# Patient Record
Sex: FEMALE | Race: WHITE | NOT HISPANIC OR LATINO | Employment: OTHER | URBAN - METROPOLITAN AREA
[De-identification: names, ages, dates, MRNs, and addresses within clinical notes are randomized per-mention and may not be internally consistent; named-entity substitution may affect disease eponyms.]

---

## 2019-07-09 ENCOUNTER — HOSPITAL ENCOUNTER (INPATIENT)
Facility: MEDICAL CENTER | Age: 69
LOS: 13 days | DRG: 086 | End: 2019-07-22
Attending: EMERGENCY MEDICINE | Admitting: SURGERY
Payer: MEDICARE

## 2019-07-09 ENCOUNTER — HOSPITAL ENCOUNTER (OUTPATIENT)
Dept: RADIOLOGY | Facility: MEDICAL CENTER | Age: 69
End: 2019-07-09

## 2019-07-09 DIAGNOSIS — S06.340A TRAUMATIC HEMORRHAGE OF RIGHT CEREBRUM WITHOUT LOSS OF CONSCIOUSNESS, INITIAL ENCOUNTER (HCC): ICD-10-CM

## 2019-07-09 DIAGNOSIS — N39.0 ACUTE UTI: ICD-10-CM

## 2019-07-09 PROBLEM — Z53.09 CONTRAINDICATION TO DEEP VEIN THROMBOSIS (DVT) PROPHYLAXIS: Status: ACTIVE | Noted: 2019-07-09

## 2019-07-09 PROBLEM — T14.90XA TRAUMA: Status: ACTIVE | Noted: 2019-07-09

## 2019-07-09 PROBLEM — E03.9 HYPOTHYROIDISM: Status: ACTIVE | Noted: 2019-07-09

## 2019-07-09 PROBLEM — I10 HYPERTENSION: Status: ACTIVE | Noted: 2019-07-09

## 2019-07-09 PROBLEM — F10.929 ACUTE ALCOHOL INTOXICATION (HCC): Status: ACTIVE | Noted: 2019-07-09

## 2019-07-09 LAB
ANION GAP SERPL CALC-SCNC: 13 MMOL/L (ref 0–11.9)
APPEARANCE UR: CLEAR
APTT PPP: 22.2 SEC (ref 24.7–36)
BACTERIA #/AREA URNS HPF: ABNORMAL /HPF
BASOPHILS # BLD AUTO: 1.2 % (ref 0–1.8)
BASOPHILS # BLD: 0.06 K/UL (ref 0–0.12)
BILIRUB UR QL STRIP.AUTO: NEGATIVE
BUN SERPL-MCNC: 5 MG/DL (ref 8–22)
CALCIUM SERPL-MCNC: 9.1 MG/DL (ref 8.5–10.5)
CHLORIDE SERPL-SCNC: 97 MMOL/L (ref 96–112)
CO2 SERPL-SCNC: 24 MMOL/L (ref 20–33)
COLOR UR: YELLOW
CREAT SERPL-MCNC: 0.72 MG/DL (ref 0.5–1.4)
EOSINOPHIL # BLD AUTO: 0.03 K/UL (ref 0–0.51)
EOSINOPHIL NFR BLD: 0.6 % (ref 0–6.9)
EPI CELLS #/AREA URNS HPF: NEGATIVE /HPF
ERYTHROCYTE [DISTWIDTH] IN BLOOD BY AUTOMATED COUNT: 46.1 FL (ref 35.9–50)
ETHANOL BLD-MCNC: 0.17 G/DL
GLUCOSE SERPL-MCNC: 85 MG/DL (ref 65–99)
GLUCOSE UR STRIP.AUTO-MCNC: NEGATIVE MG/DL
HCT VFR BLD AUTO: 40.2 % (ref 37–47)
HGB BLD-MCNC: 14.3 G/DL (ref 12–16)
HYALINE CASTS #/AREA URNS LPF: ABNORMAL /LPF
IMM GRANULOCYTES # BLD AUTO: 0 K/UL (ref 0–0.11)
IMM GRANULOCYTES NFR BLD AUTO: 0 % (ref 0–0.9)
INR PPP: 1.04 (ref 0.87–1.13)
KETONES UR STRIP.AUTO-MCNC: NEGATIVE MG/DL
LEUKOCYTE ESTERASE UR QL STRIP.AUTO: ABNORMAL
LYMPHOCYTES # BLD AUTO: 1.89 K/UL (ref 1–4.8)
LYMPHOCYTES NFR BLD: 37.6 % (ref 22–41)
MCH RBC QN AUTO: 37.3 PG (ref 27–33)
MCHC RBC AUTO-ENTMCNC: 35.6 G/DL (ref 33.6–35)
MCV RBC AUTO: 105 FL (ref 81.4–97.8)
MICRO URNS: ABNORMAL
MONOCYTES # BLD AUTO: 0.88 K/UL (ref 0–0.85)
MONOCYTES NFR BLD AUTO: 17.5 % (ref 0–13.4)
NEUTROPHILS # BLD AUTO: 2.16 K/UL (ref 2–7.15)
NEUTROPHILS NFR BLD: 43.1 % (ref 44–72)
NITRITE UR QL STRIP.AUTO: POSITIVE
NRBC # BLD AUTO: 0 K/UL
NRBC BLD-RTO: 0 /100 WBC
PH UR STRIP.AUTO: 6.5 [PH]
PLATELET # BLD AUTO: 219 K/UL (ref 164–446)
PMV BLD AUTO: 9.4 FL (ref 9–12.9)
POTASSIUM SERPL-SCNC: 3.7 MMOL/L (ref 3.6–5.5)
PROT UR QL STRIP: NEGATIVE MG/DL
PROTHROMBIN TIME: 13.8 SEC (ref 12–14.6)
RBC # BLD AUTO: 3.83 M/UL (ref 4.2–5.4)
RBC # URNS HPF: ABNORMAL /HPF
RBC UR QL AUTO: NEGATIVE
SODIUM SERPL-SCNC: 134 MMOL/L (ref 135–145)
SP GR UR STRIP.AUTO: <=1.005
UROBILINOGEN UR STRIP.AUTO-MCNC: 0.2 MG/DL
WBC # BLD AUTO: 5 K/UL (ref 4.8–10.8)
WBC #/AREA URNS HPF: ABNORMAL /HPF

## 2019-07-09 PROCEDURE — 80048 BASIC METABOLIC PNL TOTAL CA: CPT

## 2019-07-09 PROCEDURE — 80307 DRUG TEST PRSMV CHEM ANLYZR: CPT

## 2019-07-09 PROCEDURE — 85610 PROTHROMBIN TIME: CPT

## 2019-07-09 PROCEDURE — 87086 URINE CULTURE/COLONY COUNT: CPT

## 2019-07-09 PROCEDURE — 700105 HCHG RX REV CODE 258: Performed by: EMERGENCY MEDICINE

## 2019-07-09 PROCEDURE — 81001 URINALYSIS AUTO W/SCOPE: CPT

## 2019-07-09 PROCEDURE — 700111 HCHG RX REV CODE 636 W/ 250 OVERRIDE (IP): Performed by: SURGERY

## 2019-07-09 PROCEDURE — 700105 HCHG RX REV CODE 258: Performed by: SURGERY

## 2019-07-09 PROCEDURE — HZ2ZZZZ DETOXIFICATION SERVICES FOR SUBSTANCE ABUSE TREATMENT: ICD-10-PCS | Performed by: SURGERY

## 2019-07-09 PROCEDURE — 770022 HCHG ROOM/CARE - ICU (200)

## 2019-07-09 PROCEDURE — 99291 CRITICAL CARE FIRST HOUR: CPT

## 2019-07-09 PROCEDURE — 700111 HCHG RX REV CODE 636 W/ 250 OVERRIDE (IP): Performed by: EMERGENCY MEDICINE

## 2019-07-09 PROCEDURE — 36415 COLL VENOUS BLD VENIPUNCTURE: CPT

## 2019-07-09 PROCEDURE — 85025 COMPLETE CBC W/AUTO DIFF WBC: CPT

## 2019-07-09 PROCEDURE — 85730 THROMBOPLASTIN TIME PARTIAL: CPT

## 2019-07-09 RX ORDER — LISINOPRIL 40 MG/1
40 TABLET ORAL DAILY
Status: ON HOLD | COMMUNITY
End: 2019-07-22

## 2019-07-09 RX ORDER — SODIUM CHLORIDE 9 MG/ML
INJECTION, SOLUTION INTRAVENOUS CONTINUOUS
Status: DISCONTINUED | OUTPATIENT
Start: 2019-07-09 | End: 2019-07-11

## 2019-07-09 RX ORDER — DOCUSATE SODIUM 100 MG/1
100 CAPSULE, LIQUID FILLED ORAL 2 TIMES DAILY
Status: DISCONTINUED | OUTPATIENT
Start: 2019-07-10 | End: 2019-07-13

## 2019-07-09 RX ORDER — LEVOTHYROXINE SODIUM 0.07 MG/1
75 TABLET ORAL
Status: ON HOLD | COMMUNITY
End: 2019-07-22

## 2019-07-09 RX ORDER — FAMOTIDINE 20 MG/1
20 TABLET, FILM COATED ORAL 2 TIMES DAILY
Status: DISCONTINUED | OUTPATIENT
Start: 2019-07-09 | End: 2019-07-09

## 2019-07-09 RX ORDER — LORAZEPAM 2 MG/ML
3-4 INJECTION INTRAMUSCULAR
Status: DISCONTINUED | OUTPATIENT
Start: 2019-07-09 | End: 2019-07-11

## 2019-07-09 RX ORDER — AMOXICILLIN 250 MG
1 CAPSULE ORAL
Status: DISCONTINUED | OUTPATIENT
Start: 2019-07-09 | End: 2019-07-15

## 2019-07-09 RX ORDER — LORAZEPAM 2 MG/ML
1-2 INJECTION INTRAMUSCULAR
Status: DISCONTINUED | OUTPATIENT
Start: 2019-07-09 | End: 2019-07-11

## 2019-07-09 RX ORDER — AMOXICILLIN 250 MG
1 CAPSULE ORAL NIGHTLY
Status: DISCONTINUED | OUTPATIENT
Start: 2019-07-10 | End: 2019-07-13

## 2019-07-09 RX ORDER — BISACODYL 10 MG
10 SUPPOSITORY, RECTAL RECTAL
Status: DISCONTINUED | OUTPATIENT
Start: 2019-07-09 | End: 2019-07-22 | Stop reason: HOSPADM

## 2019-07-09 RX ORDER — ENEMA 19; 7 G/133ML; G/133ML
1 ENEMA RECTAL
Status: DISCONTINUED | OUTPATIENT
Start: 2019-07-09 | End: 2019-07-22 | Stop reason: HOSPADM

## 2019-07-09 RX ORDER — ONDANSETRON 2 MG/ML
4 INJECTION INTRAMUSCULAR; INTRAVENOUS EVERY 4 HOURS PRN
Status: DISCONTINUED | OUTPATIENT
Start: 2019-07-09 | End: 2019-07-22 | Stop reason: HOSPADM

## 2019-07-09 RX ORDER — POLYETHYLENE GLYCOL 3350 17 G/17G
1 POWDER, FOR SOLUTION ORAL 2 TIMES DAILY
Status: DISCONTINUED | OUTPATIENT
Start: 2019-07-10 | End: 2019-07-13

## 2019-07-09 RX ADMIN — SODIUM CHLORIDE 1000 MG: 9 INJECTION, SOLUTION INTRAVENOUS at 21:49

## 2019-07-09 RX ADMIN — SODIUM CHLORIDE: 9 INJECTION, SOLUTION INTRAVENOUS at 21:49

## 2019-07-09 RX ADMIN — LORAZEPAM 2 MG: 2 INJECTION INTRAMUSCULAR; INTRAVENOUS at 23:31

## 2019-07-09 ASSESSMENT — PATIENT HEALTH QUESTIONNAIRE - PHQ9
2. FEELING DOWN, DEPRESSED, IRRITABLE, OR HOPELESS: NOT AT ALL
1. LITTLE INTEREST OR PLEASURE IN DOING THINGS: NOT AT ALL
SUM OF ALL RESPONSES TO PHQ9 QUESTIONS 1 AND 2: 0

## 2019-07-10 ENCOUNTER — APPOINTMENT (OUTPATIENT)
Dept: RADIOLOGY | Facility: MEDICAL CENTER | Age: 69
DRG: 086 | End: 2019-07-10
Attending: SURGERY
Payer: MEDICARE

## 2019-07-10 ENCOUNTER — APPOINTMENT (OUTPATIENT)
Dept: RADIOLOGY | Facility: MEDICAL CENTER | Age: 69
DRG: 086 | End: 2019-07-10
Attending: NURSE PRACTITIONER
Payer: MEDICARE

## 2019-07-10 LAB
ALBUMIN SERPL BCP-MCNC: 3.2 G/DL (ref 3.2–4.9)
ALBUMIN/GLOB SERPL: 1.1 G/DL
ALP SERPL-CCNC: 105 U/L (ref 30–99)
ALT SERPL-CCNC: 105 U/L (ref 2–50)
ANION GAP SERPL CALC-SCNC: 10 MMOL/L (ref 0–11.9)
AST SERPL-CCNC: 154 U/L (ref 12–45)
BASOPHILS # BLD AUTO: 1 % (ref 0–1.8)
BASOPHILS # BLD: 0.04 K/UL (ref 0–0.12)
BILIRUB SERPL-MCNC: 0.7 MG/DL (ref 0.1–1.5)
BUN SERPL-MCNC: 6 MG/DL (ref 8–22)
CALCIUM SERPL-MCNC: 9 MG/DL (ref 8.5–10.5)
CHLORIDE SERPL-SCNC: 101 MMOL/L (ref 96–112)
CO2 SERPL-SCNC: 22 MMOL/L (ref 20–33)
CREAT SERPL-MCNC: 0.69 MG/DL (ref 0.5–1.4)
EOSINOPHIL # BLD AUTO: 0.05 K/UL (ref 0–0.51)
EOSINOPHIL NFR BLD: 1.2 % (ref 0–6.9)
ERYTHROCYTE [DISTWIDTH] IN BLOOD BY AUTOMATED COUNT: 47.4 FL (ref 35.9–50)
GLOBULIN SER CALC-MCNC: 2.8 G/DL (ref 1.9–3.5)
GLUCOSE SERPL-MCNC: 92 MG/DL (ref 65–99)
HCT VFR BLD AUTO: 41.1 % (ref 37–47)
HGB BLD-MCNC: 14 G/DL (ref 12–16)
IMM GRANULOCYTES # BLD AUTO: 0.01 K/UL (ref 0–0.11)
IMM GRANULOCYTES NFR BLD AUTO: 0.2 % (ref 0–0.9)
LYMPHOCYTES # BLD AUTO: 1.4 K/UL (ref 1–4.8)
LYMPHOCYTES NFR BLD: 33.7 % (ref 22–41)
MCH RBC QN AUTO: 36.5 PG (ref 27–33)
MCHC RBC AUTO-ENTMCNC: 34.1 G/DL (ref 33.6–35)
MCV RBC AUTO: 107 FL (ref 81.4–97.8)
MONOCYTES # BLD AUTO: 0.6 K/UL (ref 0–0.85)
MONOCYTES NFR BLD AUTO: 14.4 % (ref 0–13.4)
NEUTROPHILS # BLD AUTO: 2.06 K/UL (ref 2–7.15)
NEUTROPHILS NFR BLD: 49.5 % (ref 44–72)
NRBC # BLD AUTO: 0 K/UL
NRBC BLD-RTO: 0 /100 WBC
PLATELET # BLD AUTO: 206 K/UL (ref 164–446)
PMV BLD AUTO: 9.5 FL (ref 9–12.9)
POTASSIUM SERPL-SCNC: 4.2 MMOL/L (ref 3.6–5.5)
PROT SERPL-MCNC: 6 G/DL (ref 6–8.2)
RBC # BLD AUTO: 3.84 M/UL (ref 4.2–5.4)
SODIUM SERPL-SCNC: 133 MMOL/L (ref 135–145)
WBC # BLD AUTO: 4.2 K/UL (ref 4.8–10.8)

## 2019-07-10 PROCEDURE — 93970 EXTREMITY STUDY: CPT | Mod: 26 | Performed by: INTERNAL MEDICINE

## 2019-07-10 PROCEDURE — 700105 HCHG RX REV CODE 258: Performed by: SURGERY

## 2019-07-10 PROCEDURE — 93970 EXTREMITY STUDY: CPT

## 2019-07-10 PROCEDURE — A9270 NON-COVERED ITEM OR SERVICE: HCPCS | Performed by: SURGERY

## 2019-07-10 PROCEDURE — 80053 COMPREHEN METABOLIC PANEL: CPT

## 2019-07-10 PROCEDURE — 700111 HCHG RX REV CODE 636 W/ 250 OVERRIDE (IP): Performed by: SURGERY

## 2019-07-10 PROCEDURE — A9270 NON-COVERED ITEM OR SERVICE: HCPCS | Performed by: NURSE PRACTITIONER

## 2019-07-10 PROCEDURE — 700112 HCHG RX REV CODE 229: Performed by: SURGERY

## 2019-07-10 PROCEDURE — 700101 HCHG RX REV CODE 250: Performed by: SURGERY

## 2019-07-10 PROCEDURE — 700102 HCHG RX REV CODE 250 W/ 637 OVERRIDE(OP): Performed by: SURGERY

## 2019-07-10 PROCEDURE — 770022 HCHG ROOM/CARE - ICU (200)

## 2019-07-10 PROCEDURE — 700102 HCHG RX REV CODE 250 W/ 637 OVERRIDE(OP): Performed by: NURSE PRACTITIONER

## 2019-07-10 PROCEDURE — 85025 COMPLETE CBC W/AUTO DIFF WBC: CPT

## 2019-07-10 PROCEDURE — 70450 CT HEAD/BRAIN W/O DYE: CPT

## 2019-07-10 PROCEDURE — 99291 CRITICAL CARE FIRST HOUR: CPT | Performed by: SURGERY

## 2019-07-10 RX ORDER — LABETALOL HYDROCHLORIDE 5 MG/ML
10 INJECTION, SOLUTION INTRAVENOUS EVERY 4 HOURS PRN
Status: DISCONTINUED | OUTPATIENT
Start: 2019-07-10 | End: 2019-07-22 | Stop reason: HOSPADM

## 2019-07-10 RX ORDER — OXYCODONE HYDROCHLORIDE 5 MG/1
2.5 TABLET ORAL EVERY 4 HOURS PRN
Status: DISCONTINUED | OUTPATIENT
Start: 2019-07-10 | End: 2019-07-12

## 2019-07-10 RX ORDER — OXYCODONE HYDROCHLORIDE 5 MG/1
5 TABLET ORAL EVERY 4 HOURS PRN
Status: DISCONTINUED | OUTPATIENT
Start: 2019-07-10 | End: 2019-07-12

## 2019-07-10 RX ORDER — LIDOCAINE 50 MG/G
1 PATCH TOPICAL EVERY 24 HOURS
Status: DISCONTINUED | OUTPATIENT
Start: 2019-07-10 | End: 2019-07-19

## 2019-07-10 RX ORDER — LEVOTHYROXINE SODIUM 0.07 MG/1
75 TABLET ORAL
Status: DISCONTINUED | OUTPATIENT
Start: 2019-07-10 | End: 2019-07-13

## 2019-07-10 RX ORDER — SULFAMETHOXAZOLE AND TRIMETHOPRIM 800; 160 MG/1; MG/1
1 TABLET ORAL EVERY 12 HOURS
Status: DISCONTINUED | OUTPATIENT
Start: 2019-07-10 | End: 2019-07-13

## 2019-07-10 RX ORDER — LISINOPRIL 20 MG/1
40 TABLET ORAL
Status: DISCONTINUED | OUTPATIENT
Start: 2019-07-10 | End: 2019-07-13

## 2019-07-10 RX ADMIN — FENTANYL CITRATE 50 MCG: 0.05 INJECTION, SOLUTION INTRAMUSCULAR; INTRAVENOUS at 19:38

## 2019-07-10 RX ADMIN — DOCUSATE SODIUM 100 MG: 100 CAPSULE, LIQUID FILLED ORAL at 05:29

## 2019-07-10 RX ADMIN — SODIUM CHLORIDE 500 MG: 9 INJECTION, SOLUTION INTRAVENOUS at 05:29

## 2019-07-10 RX ADMIN — FENTANYL CITRATE 50 MCG: 0.05 INJECTION, SOLUTION INTRAMUSCULAR; INTRAVENOUS at 05:29

## 2019-07-10 RX ADMIN — SODIUM CHLORIDE: 9 INJECTION, SOLUTION INTRAVENOUS at 11:47

## 2019-07-10 RX ADMIN — SODIUM CHLORIDE 500 MG: 9 INJECTION, SOLUTION INTRAVENOUS at 17:50

## 2019-07-10 RX ADMIN — SULFAMETHOXAZOLE AND TRIMETHOPRIM 1 TABLET: 800; 160 TABLET ORAL at 11:45

## 2019-07-10 RX ADMIN — LIDOCAINE 1 PATCH: 50 PATCH TOPICAL at 22:23

## 2019-07-10 RX ADMIN — FENTANYL CITRATE 50 MCG: 0.05 INJECTION, SOLUTION INTRAMUSCULAR; INTRAVENOUS at 00:37

## 2019-07-10 RX ADMIN — SULFAMETHOXAZOLE AND TRIMETHOPRIM 1 TABLET: 800; 160 TABLET ORAL at 17:51

## 2019-07-10 RX ADMIN — LISINOPRIL 40 MG: 20 TABLET ORAL at 11:45

## 2019-07-10 RX ADMIN — OXYCODONE HYDROCHLORIDE 2.5 MG: 5 TABLET ORAL at 13:20

## 2019-07-10 RX ADMIN — LEVOTHYROXINE SODIUM 75 MCG: 75 TABLET ORAL at 11:46

## 2019-07-10 ASSESSMENT — LIFESTYLE VARIABLES
EVER HAD A DRINK FIRST THING IN THE MORNING TO STEADY YOUR NERVES TO GET RID OF A HANGOVER: NO
HAVE PEOPLE ANNOYED YOU BY CRITICIZING YOUR DRINKING: NO
TOTAL SCORE: 1
HOW MANY TIMES IN THE PAST YEAR HAVE YOU HAD 5 OR MORE DRINKS IN A DAY: 2
TOTAL SCORE: 1
CONSUMPTION TOTAL: POSITIVE
ALCOHOL_USE: YES
EVER FELT BAD OR GUILTY ABOUT YOUR DRINKING: NO
AVERAGE NUMBER OF DAYS PER WEEK YOU HAVE A DRINK CONTAINING ALCOHOL: 2
TOTAL SCORE: 1
HAVE YOU EVER FELT YOU SHOULD CUT DOWN ON YOUR DRINKING: YES
EVER_SMOKED: NEVER
ON A TYPICAL DAY WHEN YOU DRINK ALCOHOL HOW MANY DRINKS DO YOU HAVE: 2

## 2019-07-10 ASSESSMENT — COPD QUESTIONNAIRES
DO YOU EVER COUGH UP ANY MUCUS OR PHLEGM?: NO/ONLY WITH OCCASIONAL COLDS OR INFECTIONS
COPD SCREENING SCORE: 2
HAVE YOU SMOKED AT LEAST 100 CIGARETTES IN YOUR ENTIRE LIFE: NO/DON'T KNOW
DURING THE PAST 4 WEEKS HOW MUCH DID YOU FEEL SHORT OF BREATH: NONE/LITTLE OF THE TIME

## 2019-07-10 NOTE — PROGRESS NOTES
Dr. Parra at the bedside in ED at 2130.     Patient transferred to S104 at 2137 accompanied by ACLS RN x2  Patient placed on transport monitor via [employer].     Patient ambulated to ICU bed without incident.     HR 88  /74  RR 18  O2 sat: 96% room air  Temp: 99.5f temporal  Weight: 93.9 kg

## 2019-07-10 NOTE — ED PROVIDER NOTES
ER Provider Note     Scribed for Mike Ray M.D. by Diana Najera. 7/9/2019, 7:12 PM.    Primary Care Provider: Cory Kennedy M.D.  Means of Arrival: Ambulance   History obtained from: Patient  History limited by: None     CHIEF COMPLAINT  Chief Complaint   Patient presents with   • T-5000 GLF   • UTI       HPI  Jennifer Koroma is a 68 y.o. female who presents to the Emergency Department after being transferred in by ambulance from [facility] for evaluation of ground level fall onset today. The patient was at home walking to her restroom when she had a sudden ground level fall. She struck her head on the ground, but denies any loss of consciousness. She admits to drinking alcohol this morning. She was seen at [facility] for symptoms, diagnosed with UTI, and was found to have intracranial hemorrhage on CT scan. The patient was transferred here for further monitoring and higher acuity of care. Blood alcohol level was found to be 0.257. She denies any headache, chest pain, cough, neck pain, or abdominal pain. She is not currently anticoagulated. She reports frequent history of UTI. She is taking hypertension medications as well as Synthroid for her thyroid disease.      REVIEW OF SYSTEMS  See HPI for further details. All other systems are negative.     PAST MEDICAL HISTORY   has a past medical history of Thyroid disease.    SURGICAL HISTORY   has a past surgical history that includes hysterectomy laparoscopy and elsie by laparoscopy.    SOCIAL HISTORY  Social History   Substance Use Topics   • Smoking status: Never Smoker   • Smokeless tobacco: Never Used   • Alcohol use Yes      History   Drug Use No       FAMILY HISTORY  History reviewed. No pertinent family history.    CURRENT MEDICATIONS  Home Medications     Reviewed by Stefani Vasquez (Pharmacy Tech) on 07/09/19 at 1941  Med List Status: Complete   Medication Last Dose Status   levothyroxine (SYNTHROID) 75 MCG Tab 7/8/2019 Active  "  lisinopril (PRINIVIL, ZESTRIL) 40 MG tablet 7/8/2019 Active                ALLERGIES  Allergies   Allergen Reactions   • Codeine    • Morphine Unspecified     \"like a heartattack\"       PHYSICAL EXAM  VITAL SIGNS: /93   Pulse 87   Temp 36.6 °C (97.9 °F) (Temporal)   Resp 18   Ht 1.651 m (5' 5\")   Wt 95.3 kg (210 lb)   SpO2 95%   BMI 34.95 kg/m²       Constitutional: Alert in no apparent distress. Slightly intoxicated appearing.   HENT: No signs of trauma, Bilateral external ears normal, Nose normal.   Eyes: Pupils are equal and reactive, Conjunctiva normal, Non-icteric.   Neck: Normal range of motion, No tenderness, Supple, No stridor.   Lymphatic: No lymphadenopathy noted.   Cardiovascular: Regular rate and rhythm, no murmurs.   Thorax & Lungs: Normal breath sounds, No respiratory distress, No wheezing, No chest tenderness.   Abdomen: Bowel sounds normal, Soft, No tenderness, No masses, No pulsatile masses. No peritoneal signs.  Skin: Warm, Dry, No erythema, No rash.   Back: No bony tenderness, No CVA tenderness.   Extremities: Intact distal pulses, No edema, No tenderness, No cyanosis.  Musculoskeletal: Good range of motion in all major joints. No tenderness to palpation or major deformities noted.   Neurologic: Alert , Normal motor function, Normal sensory function, No focal deficits noted.   Psychiatric: Slightly intoxicated appearing, judgment normal, Mood normal.     DIAGNOSTIC STUDIES / PROCEDURES    LABS  Labs Reviewed   CBC WITH DIFFERENTIAL - Abnormal; Notable for the following:        Result Value    RBC 3.83 (*)     .0 (*)     MCH 37.3 (*)     MCHC 35.6 (*)     Neutrophils-Polys 43.10 (*)     Monocytes 17.50 (*)     Monos (Absolute) 0.88 (*)     All other components within normal limits    Narrative:     Indicate which anticoagulants the patient is on:->UNKNOWN   BASIC METABOLIC PANEL - Abnormal; Notable for the following:     Sodium 134 (*)     Bun 5 (*)     Anion Gap 13.0 (*)     " All other components within normal limits    Narrative:     Indicate which anticoagulants the patient is on:->UNKNOWN   URINALYSIS,CULTURE IF INDICATED - Abnormal; Notable for the following:     Nitrite Positive (*)     Leukocyte Esterase Large (*)     All other components within normal limits   DIAGNOSTIC ALCOHOL - Abnormal; Notable for the following:     Diagnostic Alcohol 0.17 (*)     All other components within normal limits    Narrative:     Indicate which anticoagulants the patient is on:->UNKNOWN   URINE MICROSCOPIC (W/UA)   PROTHROMBIN TIME   APTT   ESTIMATED GFR    Narrative:     Indicate which anticoagulants the patient is on:->UNKNOWN   URINE CULTURE(NEW)   All labs reviewed by me.    COURSE & MEDICAL DECISION MAKING  Pertinent Labs & Imaging studies reviewed. (See chart for details)    Obtained and reviewed the patient's prior labs and medical records from prior facility, which showed Na 131, TSH 0.15, Free T4 1.14, no anemia.     This is a 68 y.o. female that presents with a small intraparenchymal hemorrhage on the brain as well as urinary tract infection.  At this time the patient is neurologically intact.  She is not on any blood thinners.  I will repeat the labs from the outside facility and admit her for her head bleed as well as urinary tract infection.    7:12 PM - Patient seen and examined at bedside. Ordered UA culture, diagnostic alcohol, CBC w/ differential, BMP, APTT, Prothrombin time. Alcohol cessation was discussed with her. I advised admission to the Hospitalist for continued monitoring, which she understands and agrees with.     8:15 PM Reviewed the patient lab results as shown above.    8:33 PM I discussed the patient's case and the above findings with Dr. Parra (Neurosurgery) who agrees to consult on the patient and recommended ICU admission.    8:37 PM I discussed the patient's case and the above findings with Dr. Cano (Trauma Surgeon) who agrees to admit the patient.     The patient  has no leukocytosis but does have an elevated MCV of 105.  I spoke to her about chronic drinking.  She has a mildly elevated anion gap of 13.  Her urine is nitrite positive.  Her alcohol level is 0.17.  I spoke to the neurosurgeon who recommends patient be admitted to the trauma service.  I spoke to trauma who admit the patient to the ICU.    DISPOSITION:  Patient will be admitted to Dr. Cano in guarded condition.     FINAL IMPRESSION  1. Traumatic hemorrhage of right cerebrum without loss of consciousness, initial encounter ([facility])    2. Acute UTI          Diana ALBRECHT (Scribe), am scribing for, and in the presence of, Mike Ray M.D..    Electronically signed by: Diana Najera (Manjula), 7/9/2019    Mike ALBRECHT M.D. personally performed the services described in this documentation, as scribed by Diana Najera in my presence, and it is both accurate and complete. C.     The note accurately reflects work and decisions made by me.  Mike Ray  7/10/2019  2:01 AM

## 2019-07-10 NOTE — PROGRESS NOTES
2 RN skin check complete with ARNAUD Vidal.  Devices in place:   -bp cuff   -SCDs   -pulse ox   -2 PIVs  Skin assessed under the following devices.  Preventative measures in place including:   -pt frequently ambulated   -pillows used for support and repositioning   -medical devices offloaded from skin  Following areas of concern:    -Bruising to back     -cracked, calloused heels, heels boggy   -bruising to right knee   -scabbed abrasion to left knee    Wound consult placedYES/NO: N\A    Wound reported YES/NO: N\A  Appropriate LDAs opened YES/NO: N\A

## 2019-07-10 NOTE — PROGRESS NOTES
Patient to head CT at 0400 accompanied by ACLS RN and CCT  Patient on transport monitor. VSS throughout procedure.     Patient back to room at 0415 without incident.

## 2019-07-10 NOTE — ED NOTES
Pt assisted up to bathroom via wheelchair by RN. Pt refuses to use bedside commode for patient safety.

## 2019-07-10 NOTE — PROGRESS NOTES
Patient refusing for hospital gown placement, SCDs to bilateral calves despite education, patient continues to refuse. Patient refusing for new PIV access. MD aware.     Patient verbalizing wanting to leave. Discussed plan of care and MD orders with patient including follow up CT imaging of head. Discussed that discharge planning to be done during rounds tomorrow and after CT results have been read. Patient verbalized understanding.

## 2019-07-10 NOTE — CARE PLAN
Problem: Safety  Goal: Will remain free from injury  Safety and fall precautions in place and implemented at all times during turns, transfers and mobility. Proper staff assist present, patient utilizing call light for staff assistance.     Problem: Infection  Goal: Will remain free from infection  Standard precautions in place with proper hand hygiene.   Encouraging patient to wash hands and provided with hand  wipes at bedside.     Problem: Urinary Elimination:  Goal: Ability to reestablish a normal urinary elimination pattern will improve  Monitoring strict I&O and daily weights.   Patient voiding to BSC with standby assist.   Educated patient on properly wiping after voiding d/t patient's UTI. Patient does not return demonstrate. Will continue to educate.

## 2019-07-10 NOTE — PROGRESS NOTES
"Trauma Progress Note 7/10/2019 5:57 AM    This is a 68 y.o. female with hemorrhage of right cerebrum after a ground level fall while intoxicated. Her follow up head CT this AM is stable.     Plan: continue neuro checks and alcohol withdrawal surveillance     Assessment: alert and oriented. Stable neuro assessment through the night. Tremulous. Received Ativan x 1 dose for control of symptoms overnight.     /93   Pulse (!) 103   Temp 37.5 °C (99.5 °F) (Temporal)   Resp 18   Ht 1.676 m (5' 6\")   Wt 94 kg (207 lb 3.7 oz)   SpO2 95%   BMI 33.45 kg/m²     Hemoglobin: 14.0 g/dL  Hematocrit: 41.1 %    Urine Output: voiding, adequate amount    Recent Labs      07/09/19 2011   APTT  22.2*   INR  1.04        Traumatic hemorrhage of right cerebrum without loss of consciousness (HCC)- (present on admission)   Assessment & Plan    Traumatic hemorrhage of right cerebrum  Definitive plan pending.   Follow up head CT in AM  Post traumatic pharmacologic seizure prophylaxis for 1 week.  Speech Language Pathology cognitive evaluation.  Kashmir Parra MD. Neurosurgery.     Hypertension- (present on admission)   Assessment & Plan    Chronic condition treated with Lisinopril.  Resume maintenance medication in AM.     Hypothyroidism- (present on admission)   Assessment & Plan    Chronic condition treated with Levothyroxine.  Resume maintenance medication in AM.     Contraindication to deep vein thrombosis (DVT) prophylaxis- (present on admission)   Assessment & Plan    Initial systemic anticoagulation contraindicated secondary to elevated bleeding risk.   7/10 Surveillance venous duplex scanning ordered     UTI (urinary tract infection)- (present on admission)   Assessment & Plan    Antibiotics initiated     Acute alcohol intoxication (HCC)- (present on admission)   Assessment & Plan    Admission blood alcohol level of 0.17. BA at [facility] 0.25  Trauma alcohol withdrawal protocol initiated.  Alcohol withdrawal " surveillance.     Trauma- (present on admission)   Assessment & Plan    Ground level fall. Intoxicated fell in bathroom. Also has a UTI.  T-5000 Activation. Transferred from [facility]  Hazel Cano MD. Trauma Surgery.

## 2019-07-10 NOTE — PROGRESS NOTES
Trauma / Surgical Daily Progress Note    Date of Service  7/10/2019    Chief Complaint  68 y.o. female admitted 7/9/2019 with ICH (intracerebral hemorrhage) (HCC)    Interval Events  New admit  Repeat head CT :  Stable.  To Q2 neurochecks  GCS 15  Restart lisinopril.    Advance diet  Prn labetalol        Review of Systems  Review of Systems     Vital Signs for last 24 hours  Temp:  [36.6 °C (97.9 °F)-37.5 °C (99.5 °F)] 36.8 °C (98.3 °F)  Pulse:  [] 80  Resp:  [16-34] 20  BP: (159)/(93) 159/93  SpO2:  [90 %-98 %] 95 %    Hemodynamic parameters for last 24 hours       Respiratory Data     Respiration: 20, Pulse Oximetry: 95 %        RUL Breath Sounds: Clear, RML Breath Sounds: Clear, RLL Breath Sounds: Diminished, CAIN Breath Sounds: Clear, LLL Breath Sounds: Diminished    Physical Exam  Physical Exam   HENT:   Head: Normocephalic.   Eyes: Pupils are equal, round, and reactive to light.   Neck: No JVD present.   Cardiovascular: Regular rhythm.    Pulmonary/Chest: No respiratory distress. She has no wheezes.   Abdominal: Soft. There is no tenderness.   Neurological: She is alert. GCS eye subscore is 4. GCS verbal subscore is 5. GCS motor subscore is 6.   Skin: She is not diaphoretic.       Laboratory  Recent Results (from the past 24 hour(s))   CBC WITH DIFFERENTIAL    Collection Time: 07/09/19  7:42 PM   Result Value Ref Range    WBC 5.0 4.8 - 10.8 K/uL    RBC 3.83 (L) 4.20 - 5.40 M/uL    Hemoglobin 14.3 12.0 - 16.0 g/dL    Hematocrit 40.2 37.0 - 47.0 %    .0 (H) 81.4 - 97.8 fL    MCH 37.3 (H) 27.0 - 33.0 pg    MCHC 35.6 (H) 33.6 - 35.0 g/dL    RDW 46.1 35.9 - 50.0 fL    Platelet Count 219 164 - 446 K/uL    MPV 9.4 9.0 - 12.9 fL    Neutrophils-Polys 43.10 (L) 44.00 - 72.00 %    Lymphocytes 37.60 22.00 - 41.00 %    Monocytes 17.50 (H) 0.00 - 13.40 %    Eosinophils 0.60 0.00 - 6.90 %    Basophils 1.20 0.00 - 1.80 %    Immature Granulocytes 0.00 0.00 - 0.90 %    Nucleated RBC 0.00 /100 WBC    Neutrophils  (Absolute) 2.16 2.00 - 7.15 K/uL    Lymphs (Absolute) 1.89 1.00 - 4.80 K/uL    Monos (Absolute) 0.88 (H) 0.00 - 0.85 K/uL    Eos (Absolute) 0.03 0.00 - 0.51 K/uL    Baso (Absolute) 0.06 0.00 - 0.12 K/uL    Immature Granulocytes (abs) 0.00 0.00 - 0.11 K/uL    NRBC (Absolute) 0.00 K/uL   BASIC METABOLIC PANEL    Collection Time: 07/09/19  7:42 PM   Result Value Ref Range    Sodium 134 (L) 135 - 145 mmol/L    Potassium 3.7 3.6 - 5.5 mmol/L    Chloride 97 96 - 112 mmol/L    Co2 24 20 - 33 mmol/L    Glucose 85 65 - 99 mg/dL    Bun 5 (L) 8 - 22 mg/dL    Creatinine 0.72 0.50 - 1.40 mg/dL    Calcium 9.1 8.5 - 10.5 mg/dL    Anion Gap 13.0 (H) 0.0 - 11.9   URINALYSIS CULTURE, IF INDICATED    Collection Time: 07/09/19  7:42 PM   Result Value Ref Range    Color Yellow     Character Clear     Specific Gravity <=1.005 <1.035    Ph 6.5 5.0 - 8.0    Glucose Negative Negative mg/dL    Ketones Negative Negative mg/dL    Protein Negative Negative mg/dL    Bilirubin Negative Negative    Urobilinogen, Urine 0.2 Negative    Nitrite Positive (A) Negative    Leukocyte Esterase Large (A) Negative    Occult Blood Negative Negative    Micro Urine Req Microscopic    DIAGNOSTIC ALCOHOL    Collection Time: 07/09/19  7:42 PM   Result Value Ref Range    Diagnostic Alcohol 0.17 (H) 0.00 g/dL   URINE MICROSCOPIC (W/UA)    Collection Time: 07/09/19  7:42 PM   Result Value Ref Range    WBC 20-50 (A) /hpf    RBC 0-2 /hpf    Bacteria Few (A) None /hpf    Epithelial Cells Negative /hpf    Hyaline Cast 3-5 (A) /lpf   ESTIMATED GFR    Collection Time: 07/09/19  7:42 PM   Result Value Ref Range    GFR If African American >60 >60 mL/min/1.73 m 2    GFR If Non African American >60 >60 mL/min/1.73 m 2   Prothrombin Time    Collection Time: 07/09/19  8:11 PM   Result Value Ref Range    PT 13.8 12.0 - 14.6 sec    INR 1.04 0.87 - 1.13   APTT    Collection Time: 07/09/19  8:11 PM   Result Value Ref Range    APTT 22.2 (L) 24.7 - 36.0 sec   CBC with Differential:  Tomorrow AM    Collection Time: 07/10/19  5:35 AM   Result Value Ref Range    WBC 4.2 (L) 4.8 - 10.8 K/uL    RBC 3.84 (L) 4.20 - 5.40 M/uL    Hemoglobin 14.0 12.0 - 16.0 g/dL    Hematocrit 41.1 37.0 - 47.0 %    .0 (H) 81.4 - 97.8 fL    MCH 36.5 (H) 27.0 - 33.0 pg    MCHC 34.1 33.6 - 35.0 g/dL    RDW 47.4 35.9 - 50.0 fL    Platelet Count 206 164 - 446 K/uL    MPV 9.5 9.0 - 12.9 fL    Neutrophils-Polys 49.50 44.00 - 72.00 %    Lymphocytes 33.70 22.00 - 41.00 %    Monocytes 14.40 (H) 0.00 - 13.40 %    Eosinophils 1.20 0.00 - 6.90 %    Basophils 1.00 0.00 - 1.80 %    Immature Granulocytes 0.20 0.00 - 0.90 %    Nucleated RBC 0.00 /100 WBC    Neutrophils (Absolute) 2.06 2.00 - 7.15 K/uL    Lymphs (Absolute) 1.40 1.00 - 4.80 K/uL    Monos (Absolute) 0.60 0.00 - 0.85 K/uL    Eos (Absolute) 0.05 0.00 - 0.51 K/uL    Baso (Absolute) 0.04 0.00 - 0.12 K/uL    Immature Granulocytes (abs) 0.01 0.00 - 0.11 K/uL    NRBC (Absolute) 0.00 K/uL   Comp Metabolic Panel (CMP): Tomorrow AM    Collection Time: 07/10/19  5:35 AM   Result Value Ref Range    Sodium 133 (L) 135 - 145 mmol/L    Potassium 4.2 3.6 - 5.5 mmol/L    Chloride 101 96 - 112 mmol/L    Co2 22 20 - 33 mmol/L    Anion Gap 10.0 0.0 - 11.9    Glucose 92 65 - 99 mg/dL    Bun 6 (L) 8 - 22 mg/dL    Creatinine 0.69 0.50 - 1.40 mg/dL    Calcium 9.0 8.5 - 10.5 mg/dL    AST(SGOT) 154 (H) 12 - 45 U/L    ALT(SGPT) 105 (H) 2 - 50 U/L    Alkaline Phosphatase 105 (H) 30 - 99 U/L    Total Bilirubin 0.7 0.1 - 1.5 mg/dL    Albumin 3.2 3.2 - 4.9 g/dL    Total Protein 6.0 6.0 - 8.2 g/dL    Globulin 2.8 1.9 - 3.5 g/dL    A-G Ratio 1.1 g/dL   ESTIMATED GFR    Collection Time: 07/10/19  5:35 AM   Result Value Ref Range    GFR If African American >60 >60 mL/min/1.73 m 2    GFR If Non African American >60 >60 mL/min/1.73 m 2       Fluids    Intake/Output Summary (Last 24 hours) at 07/10/19 1044  Last data filed at 07/10/19 0800   Gross per 24 hour   Intake          1218.33 ml   Output               850 ml   Net           368.33 ml       Core Measures & Quality Metrics  Labs reviewed, Medications reviewed and Radiology images reviewed  Ross catheter: No Ross      DVT Prophylaxis: Contraindicated - High bleeding risk  DVT prophylaxis - mechanical: SCDs          ADDY Score  ETOH Screening    Assessment/Plan  Traumatic hemorrhage of right cerebrum without loss of consciousness (HCC)- (present on admission)   Assessment & Plan    Traumatic hemorrhage of right cerebrum  Definitive plan pending.   Follow up head CT in AM  Post traumatic pharmacologic seizure prophylaxis for 1 week.  Speech Language Pathology cognitive evaluation.  Kashmir Parra MD. Neurosurgery.     Hypertension- (present on admission)   Assessment & Plan    Chronic condition treated with Lisinopril.  Resume maintenance medication in AM.     Hypothyroidism- (present on admission)   Assessment & Plan    Chronic condition treated with Levothyroxine.  Resume maintenance medication in AM.     Contraindication to deep vein thrombosis (DVT) prophylaxis- (present on admission)   Assessment & Plan    Initial systemic anticoagulation contraindicated secondary to elevated bleeding risk.   7/10 Surveillance venous duplex scanning ordered     UTI (urinary tract infection)- (present on admission)   Assessment & Plan    Antibiotics initiated     Acute alcohol intoxication (HCC)- (present on admission)   Assessment & Plan    Admission blood alcohol level of 0.17. BA at [facility] 0.25  Trauma alcohol withdrawal protocol initiated.  Alcohol withdrawal surveillance.     Trauma- (present on admission)   Assessment & Plan    Ground level fall. Intoxicated fell in bathroom. Also has a UTI.  T-5000 Activation. Transferred from [facility]  Hazel Cano MD. Trauma Surgery.         Discussed patient condition with RN, RT, Pharmacy and Dietary.  CRITICAL CARE TIME EXCLUDING PROCEDURES: 35 minutes.  Decision making of high complexity.  I reviewed clinical  labs, trends and orders for follow up.  Review of imaging,reports, consultant documentation .  Utilization of the information in todays decision making.   I evaluated the patient condition at bedside and discussed the daily plan(s) with available nursing staff,  pharmacists on rounds.  Managing intracranial hemorrhage, alcohol intoxication at risk for delirium tremens,  UTI and antibiotics.

## 2019-07-10 NOTE — H&P
DATE OF ADMISSION:  07/09/2019    IDENTIFICATION:  A 68-year-old female.    HISTORY OF PRESENT ILLNESS:  The patient was apparently walking in her house   and had a ground level fall, she struck her head.  Denied loss of   consciousness.  She had been drinking.  She was taken to [facility]   where she had a CT scan, which demonstrated an intracranial hemorrhage.  She   was transferred to [facility] for further evaluation.  Her blood   alcohol level at [facility] was 0.25.    PAST MEDICAL HISTORY:  ILLNESSES:  Hypothyroidism.    PAST SURGICAL HISTORY:  Hysterectomy, cholecystectomy, bilateral knee   replacements, shoulder replacement.    MEDICATIONS:  Synthroid and lisinopril.    ALLERGIES:  CODEINE AND MORPHINE.    SOCIAL HISTORY:  She lives in [place].  She drinks daily.  She does not   smoke.    REVIEW OF SYSTEMS:  Otherwise unremarkable.    PHYSICAL EXAMINATION:  VITAL SIGNS:  Her blood pressure was 159/93, heart rates in the 80s.  GENERAL:  She is alert and cooperative.  HEENT:  Her head is without evidence of external trauma.  Pupils are 3 mm.    Extraocular movements intact.  NECK:  Supple and nontender.  She is not in a collar.  CHEST:  Nontender.  ABDOMEN:  Soft.  PELVIS:  Stable.  EXTREMITIES:  Strength is 5/5 throughout.  NEUROLOGIC:  She is GCS of 15.    IMAGING:  CT scan shows a small right occipital intracranial hemorrhage.    IMPRESSION:  A 68-year-old female, status post ground level fall with   intracranial hemorrhage with alcohol intoxication.    PLAN:  Plan will be admission to the ICU.  We will do a followup CT scan in   the morning to make sure this does not change.  She will be seen by Dr. Parra   from neurosurgery.  We will place her on Keppra.  We will also monitor for any   alcohol withdrawal.       ____________________________________     SHANITA BUCK MD    [facility] / [facility]    DD:  07/10/2019 05:56:57  DT:  07/10/2019 07:22:02    D#:  [id number]  Job#:  [id number]

## 2019-07-10 NOTE — ED NOTES
Pt refused to wait for assistance to restroom; Pt ambulated to bathroom; Tech assisted pt back to room. Pt educated on fall prevention and to please use the call light before standing or getting out of bed. Pt verbalized understanding but stated she refuses to wait for help. Pt provided call light, Side rails put up on pt bed.

## 2019-07-10 NOTE — PROGRESS NOTES
Neuro PA Katie Peter called this RN. Q4h neuro exams okay and per neuro it is okay with them for pt to be transported to floor when the critical care team is ready

## 2019-07-10 NOTE — ASSESSMENT & PLAN NOTE
Ground level fall. Intoxicated fell in bathroom. Also has a UTI.  T-5000 Activation. Transferred from [facility].  Hazel Cano MD. Trauma Surgery.

## 2019-07-10 NOTE — ASSESSMENT & PLAN NOTE
Blood alcohol levelat [facility] 0.25.  7/11 Brief intervention completed.   - Withdrawal symptoms noted / ashley SPENCERly bag and multivitamins and Librium initiated.   7/12  Brief intervention completed again. Pt more forthcoming this assessment and family at bedside.  7/12  Acute withdrawal to ICU with phenobarb prootcol  7/13  Changed to ativan as phenobarb ineffective   7/14  Periods of decreased responsiveness - repeat CT head Ok.  Neurologic exam at baseline

## 2019-07-10 NOTE — PROGRESS NOTES
"2 RN skin check complete with Ruthy ROTH RN.    Devices in place:   -EKG monitor, BP cuff to LUE, pulse ox probe to right finger   -PIV x2 to R AC                Skin assessed under the following devices:     -Mentioned medical devices above     -Bony prominences including heels, elbows, sacrum, hips and posterior head                Preventative measures in place including:     -Increase mobility as tolerated.      -Patient turns self independently     -Low air loss mattress     -Elevating heels and elbows on pillows     -Waffle cushion offered to posterior head, patient refused    Following areas of concern:    -Small red bruising noted to posterior head   -Bruising noted to left upper posterior back, per patient \"from falling\"   -Scabbed abrasion and bruising to left knee   -Bruising noted to right knee   -Cracked, bleeding, calloused heels, heels partially boggy   -Elbows with bruising     Wound consult placedYES/NO: No  Wound reported YES/NO: No  Appropriate LDAs opened YES/NO: No    "

## 2019-07-10 NOTE — CARE PLAN
Problem: Safety  Goal: Will remain free from falls    Intervention: Implement fall precautions  Pt educated on the importance of treaded slipper socks when out of bed, bed locked and in lowest position, call button in reach, pt educated to not get out of bed without a nurse present.      Problem: Infection  Goal: Will remain free from infection    Intervention: Assess signs and symptoms of infection  Pt assessed for infection including WBC, temperature, etc. Infection prevention techniques in place like HH prior to and after each pt encounter, scrubbing the hubs of IV lines, etc.

## 2019-07-10 NOTE — ASSESSMENT & PLAN NOTE
Initial systemic anticoagulation contraindicated secondary to elevated bleeding risk.  RAP score 3.  7/10 Surveillance venous duplex scanning negative for above knee DVT.  7/19 Lovenox commenced.  Ambulate TID when able

## 2019-07-10 NOTE — ED TRIAGE NOTES
"Jennifer Koroma  Chief Complaint   Patient presents with   • T-5000 GLF   • UTI     Pt transferred from [facility] for above complaint.  Per EMS has \"small bleed\",  VSS,  A&O x4.  Denies any pain or injury.    Pt also treated there for UTI, which are chronic- received rocephin PTA.     Pt refusing to change into gown,  On monitor.  Assisted to BSC.    Chart up for ERP.    "

## 2019-07-10 NOTE — PROGRESS NOTES
"Patient was refusing IVMF d/t placement of IV in AC and patient bending arm causing IV pump to alarm. Discussed that patient to keep arm straight or for RN to place another IV in a better location. Patient initially refused and states, \"no one ever gets an IV on me, it's nearly impossible.\" Patient then agreeable for this RN to place IV, IV placed successfully in left hand and IVMF infusing. Patient then agreeable to wear hospital gown d/t frequent ambulation to BSC.   "

## 2019-07-10 NOTE — PROGRESS NOTES
Patient seen and examined.    69yo female fell backwards and could not get up.  She called for assistance and was brought to the ED at [facility].    GCS = 15.    Head CT showed small right occipital intracerebral hemorrhage.      Agree with ICU admission and Keppra and repeat Head CT and q 1 hour neuro checks.  Will dictate note.

## 2019-07-10 NOTE — ASSESSMENT & PLAN NOTE
Traumatic hemorrhage of right cerebrum.  Repeat head CT completed.  Additional head CT stable.  Non-operative management.   Post traumatic pharmacologic seizure prophylaxis for 1 week complete.  Speech Language Pathology cognitive evaluation.   Kashmir Parra MD. Neurosurgery

## 2019-07-11 ENCOUNTER — APPOINTMENT (OUTPATIENT)
Dept: RADIOLOGY | Facility: MEDICAL CENTER | Age: 69
DRG: 086 | End: 2019-07-11
Attending: NURSE PRACTITIONER
Payer: MEDICARE

## 2019-07-11 PROBLEM — M25.512 LEFT SHOULDER PAIN: Status: ACTIVE | Noted: 2019-07-11

## 2019-07-11 LAB
BACTERIA UR CULT: NORMAL
MAGNESIUM SERPL-MCNC: 1.1 MG/DL (ref 1.5–2.5)
PHOSPHATE SERPL-MCNC: 3.4 MG/DL (ref 2.5–4.5)
SIGNIFICANT IND 70042: NORMAL
SITE SITE: NORMAL
SOURCE SOURCE: NORMAL

## 2019-07-11 PROCEDURE — 700102 HCHG RX REV CODE 250 W/ 637 OVERRIDE(OP): Performed by: SURGERY

## 2019-07-11 PROCEDURE — 73030 X-RAY EXAM OF SHOULDER: CPT | Mod: LT

## 2019-07-11 PROCEDURE — 770001 HCHG ROOM/CARE - MED/SURG/GYN PRIV*

## 2019-07-11 PROCEDURE — 700102 HCHG RX REV CODE 250 W/ 637 OVERRIDE(OP): Performed by: NURSE PRACTITIONER

## 2019-07-11 PROCEDURE — 99233 SBSQ HOSP IP/OBS HIGH 50: CPT | Performed by: SURGERY

## 2019-07-11 PROCEDURE — 70450 CT HEAD/BRAIN W/O DYE: CPT

## 2019-07-11 PROCEDURE — 700105 HCHG RX REV CODE 258: Performed by: SURGERY

## 2019-07-11 PROCEDURE — 700101 HCHG RX REV CODE 250: Performed by: SURGERY

## 2019-07-11 PROCEDURE — A9270 NON-COVERED ITEM OR SERVICE: HCPCS | Performed by: SURGERY

## 2019-07-11 PROCEDURE — 83735 ASSAY OF MAGNESIUM: CPT

## 2019-07-11 PROCEDURE — 700111 HCHG RX REV CODE 636 W/ 250 OVERRIDE (IP): Performed by: SURGERY

## 2019-07-11 PROCEDURE — 51798 US URINE CAPACITY MEASURE: CPT

## 2019-07-11 PROCEDURE — A9270 NON-COVERED ITEM OR SERVICE: HCPCS | Performed by: NURSE PRACTITIONER

## 2019-07-11 PROCEDURE — 84100 ASSAY OF PHOSPHORUS: CPT

## 2019-07-11 RX ORDER — LORAZEPAM 2 MG/1
4 TABLET ORAL
Status: DISCONTINUED | OUTPATIENT
Start: 2019-07-11 | End: 2019-07-12

## 2019-07-11 RX ORDER — THIAMINE MONONITRATE (VIT B1) 100 MG
100 TABLET ORAL DAILY
Status: DISCONTINUED | OUTPATIENT
Start: 2019-07-12 | End: 2019-07-13

## 2019-07-11 RX ORDER — MAGNESIUM SULFATE HEPTAHYDRATE 40 MG/ML
4 INJECTION, SOLUTION INTRAVENOUS ONCE
Status: COMPLETED | OUTPATIENT
Start: 2019-07-11 | End: 2019-07-11

## 2019-07-11 RX ORDER — LORAZEPAM 2 MG/1
2 TABLET ORAL
Status: DISCONTINUED | OUTPATIENT
Start: 2019-07-11 | End: 2019-07-12

## 2019-07-11 RX ORDER — LORAZEPAM 2 MG/ML
2 INJECTION INTRAMUSCULAR
Status: DISCONTINUED | OUTPATIENT
Start: 2019-07-11 | End: 2019-07-12

## 2019-07-11 RX ORDER — CHLORDIAZEPOXIDE HYDROCHLORIDE 25 MG/1
25 CAPSULE, GELATIN COATED ORAL EVERY 6 HOURS
Status: DISCONTINUED | OUTPATIENT
Start: 2019-07-13 | End: 2019-07-13

## 2019-07-11 RX ORDER — FOLIC ACID 1 MG/1
1 TABLET ORAL DAILY
Status: DISCONTINUED | OUTPATIENT
Start: 2019-07-12 | End: 2019-07-13

## 2019-07-11 RX ORDER — CHLORDIAZEPOXIDE HYDROCHLORIDE 25 MG/1
50 CAPSULE, GELATIN COATED ORAL EVERY 6 HOURS
Status: COMPLETED | OUTPATIENT
Start: 2019-07-12 | End: 2019-07-12

## 2019-07-11 RX ORDER — LORAZEPAM 1 MG/1
1 TABLET ORAL EVERY 4 HOURS PRN
Status: DISCONTINUED | OUTPATIENT
Start: 2019-07-11 | End: 2019-07-12

## 2019-07-11 RX ORDER — LORAZEPAM 2 MG/ML
1 INJECTION INTRAMUSCULAR
Status: DISCONTINUED | OUTPATIENT
Start: 2019-07-11 | End: 2019-07-12

## 2019-07-11 RX ORDER — LORAZEPAM 1 MG/1
0.5 TABLET ORAL EVERY 4 HOURS PRN
Status: DISCONTINUED | OUTPATIENT
Start: 2019-07-11 | End: 2019-07-12

## 2019-07-11 RX ORDER — LORAZEPAM 2 MG/ML
1.5 INJECTION INTRAMUSCULAR
Status: DISCONTINUED | OUTPATIENT
Start: 2019-07-11 | End: 2019-07-12

## 2019-07-11 RX ORDER — LORAZEPAM 2 MG/ML
0.5 INJECTION INTRAMUSCULAR EVERY 4 HOURS PRN
Status: DISCONTINUED | OUTPATIENT
Start: 2019-07-11 | End: 2019-07-12

## 2019-07-11 RX ADMIN — LIDOCAINE 1 PATCH: 50 PATCH TOPICAL at 21:45

## 2019-07-11 RX ADMIN — SULFAMETHOXAZOLE AND TRIMETHOPRIM 1 TABLET: 800; 160 TABLET ORAL at 17:16

## 2019-07-11 RX ADMIN — SODIUM CHLORIDE 500 MG: 9 INJECTION, SOLUTION INTRAVENOUS at 05:14

## 2019-07-11 RX ADMIN — FENTANYL CITRATE 50 MCG: 0.05 INJECTION, SOLUTION INTRAMUSCULAR; INTRAVENOUS at 12:42

## 2019-07-11 RX ADMIN — OXYCODONE HYDROCHLORIDE 5 MG: 5 TABLET ORAL at 22:00

## 2019-07-11 RX ADMIN — SODIUM CHLORIDE 500 MG: 9 INJECTION, SOLUTION INTRAVENOUS at 17:20

## 2019-07-11 RX ADMIN — LISINOPRIL 40 MG: 20 TABLET ORAL at 05:14

## 2019-07-11 RX ADMIN — MAGNESIUM SULFATE IN WATER 4 G: 40 INJECTION, SOLUTION INTRAVENOUS at 12:14

## 2019-07-11 RX ADMIN — SULFAMETHOXAZOLE AND TRIMETHOPRIM 1 TABLET: 800; 160 TABLET ORAL at 05:14

## 2019-07-11 RX ADMIN — OXYCODONE HYDROCHLORIDE 5 MG: 5 TABLET ORAL at 09:14

## 2019-07-11 RX ADMIN — LORAZEPAM 2 MG: 2 INJECTION INTRAMUSCULAR; INTRAVENOUS at 04:49

## 2019-07-11 RX ADMIN — OXYCODONE HYDROCHLORIDE 5 MG: 5 TABLET ORAL at 05:14

## 2019-07-11 RX ADMIN — LEVOTHYROXINE SODIUM 75 MCG: 75 TABLET ORAL at 05:14

## 2019-07-11 RX ADMIN — OXYCODONE HYDROCHLORIDE 5 MG: 5 TABLET ORAL at 00:23

## 2019-07-11 RX ADMIN — OXYCODONE HYDROCHLORIDE 5 MG: 5 TABLET ORAL at 18:17

## 2019-07-11 ASSESSMENT — COGNITIVE AND FUNCTIONAL STATUS - GENERAL
MOVING TO AND FROM BED TO CHAIR: A LITTLE
DRESSING REGULAR LOWER BODY CLOTHING: A LITTLE
TOILETING: A LITTLE
DRESSING REGULAR UPPER BODY CLOTHING: A LITTLE
WALKING IN HOSPITAL ROOM: A LITTLE
TURNING FROM BACK TO SIDE WHILE IN FLAT BAD: A LITTLE
SUGGESTED CMS G CODE MODIFIER DAILY ACTIVITY: CJ
STANDING UP FROM CHAIR USING ARMS: A LITTLE
CLIMB 3 TO 5 STEPS WITH RAILING: A LITTLE
DAILY ACTIVITIY SCORE: 20
HELP NEEDED FOR BATHING: A LITTLE
SUGGESTED CMS G CODE MODIFIER MOBILITY: CK
MOBILITY SCORE: 18
MOVING FROM LYING ON BACK TO SITTING ON SIDE OF FLAT BED: A LITTLE

## 2019-07-11 ASSESSMENT — LIFESTYLE VARIABLES
VISUAL DISTURBANCES: MODERATE SENSITIVITY
HEADACHE, FULLNESS IN HEAD: MILD
AUDITORY DISTURBANCES: MILD HARSHNESS OR ABILITY TO FRIGHTEN
TOTAL SCORE: VERY MILD ITCHING, PINS AND NEEDLES SENSATION, BURNING OR NUMBNESS
AGITATION: *
TOTAL SCORE: 18
PAROXYSMAL SWEATS: *
ANXIETY: MILDLY ANXIOUS
NAUSEA AND VOMITING: MILD NAUSEA WITH NO VOMITING
ORIENTATION AND CLOUDING OF SENSORIUM: ORIENTED AND CAN DO SERIAL ADDITIONS
SUBSTANCE_ABUSE: 0
TREMOR: *

## 2019-07-11 ASSESSMENT — ENCOUNTER SYMPTOMS
ABDOMINAL PAIN: 0
SPEECH CHANGE: 0
BACK PAIN: 0
SHORTNESS OF BREATH: 0
BLURRED VISION: 0
VOMITING: 0
MYALGIAS: 1
FOCAL WEAKNESS: 0
NECK PAIN: 0
SENSORY CHANGE: 0
NAUSEA: 0
CHILLS: 0
DIZZINESS: 0
FEVER: 0
CONSTIPATION: 0
DOUBLE VISION: 0
HEADACHES: 0
TINGLING: 0

## 2019-07-11 NOTE — PROGRESS NOTES
2 RN skin check complete with ARNAUD Estrada.    Generalized bruising  Heels red/blanching, floated  L knee abrasion    All preventative measures in place.

## 2019-07-11 NOTE — ASSESSMENT & PLAN NOTE
History of chronic left shoulder pain with decreased ROM.  Persistent pain.  7/11 Dedicated films negative for acute injury.

## 2019-07-11 NOTE — PROGRESS NOTES
TRAUMA TERTIARY SURVEY     Mental status adequate for full examination?: Yes    Spine cleared (radiologically and/or clinically): Yes    PHYSICAL EXAMINATION: See physical exam documented in progress note dated 7/11/2019.    IMAGING:  US-TRAUMA VEIN SCREEN LOWER BILAT EXTREMITY   Final Result      CT-HEAD W/O   Final Result         1.  Hyperdensity adjacent to the right posterior horn ventricle, similar to prior study, concerning for hemorrhagic focus.   2.  Nonspecific white matter changes, commonly associated with small vessel ischemic disease.  Associated mild cerebral atrophy is noted.   3.  Atherosclerosis.      CT-HEAD W/O    (Results Pending)   DX-SHOULDER 2+ LEFT    (Results Pending)     All current laboratory studies/radiology exams reviewed: No, repeat head CT this afternoon    Completed Consultations:  Dr. Parra, neurosurgery     Pending Consultations:  none    Newly Identified Diagnoses and Injuries:  Worsening left shoulder pain - xray pending    ADDY Score    ETOH Screening

## 2019-07-11 NOTE — DISCHARGE PLANNING
LSW met with pt for assessment. Face sheet is up to date and accurate. Pt lives in a single level home alone. No DME in the home. Prior to hospitalization pt was independent with all ADLs.IADLs. Pt drives. Pt's daughter, Brigida Johnson [phone] lives locally and is pt's main support. Pt has no financial concerns at this time. Pharmacy is [facility] in [place].     Care Transition Team Assessment    Information Source  Orientation : Oriented x 4  Information Given By: Patient  Who is responsible for making decisions for patient? : Patient    Readmission Evaluation  Is this a readmission?: No    Elopement Risk  Legal Hold: No  Ambulatory or Self Mobile in Wheelchair: No-Not an Elopement Risk  Elopement Risk: Not at Risk for Elopement    Interdisciplinary Discharge Planning  Patient or legal guardian wants to designate a caregiver (see row info): No    Discharge Preparedness  What is your plan after discharge?: Uncertain - pending medical team collaboration, Skilled nursing facility, Home health care  What are your discharge supports?: Child  Prior Functional Level: Ambulatory, Independent with Activities of Daily Living, Independent with Medication Management  Difficulity with ADLs: Walking  Difficulity with IADLs: Driving, Laundry, Shopping    Functional Assesment  Prior Functional Level: Ambulatory, Independent with Activities of Daily Living, Independent with Medication Management    Finances  Financial Barriers to Discharge: No  Prescription Coverage: Yes    Vision / Hearing Impairment  Right Eye Vision: Impaired, Wears Glasses  Left Eye Vision: Impaired, Wears Glasses         Advance Directive  Advance Directive?: None    Domestic Abuse  Have you ever been the victim of abuse or violence?: No    Psychological Assessment  History of Substance Abuse: None  History of Psychiatric Problems: No  Non-compliant with Treatment: No  Newly Diagnosed Illness: Yes         Anticipated Discharge  Information  Anticipated discharge disposition: Acute rehab, Discharge needs currently unknown, HHC, Home, SNF

## 2019-07-11 NOTE — PROGRESS NOTES
2 RN skin check complete with ARNAUD Coley.  Devices in place:   -SCDs   -2 PIVs   -bp cuff   -EKG leads   -silicone oxygen tubing  Skin assessed under the following devices.  Preventative measures in place including:   -frequent turns, repositioning, and ambulation   -pillows used for support and repositioning   -heels offloaded   -silicone tubing used  Following areas of concern:   ·brusing to back   -cracked, calloused heels, heels boggy  -bruising to right knee  -scabbed abrasion to left knee      Wound consult placedYES/NO: N\A    Wound reported YES/NO: N\A  Appropriate LDAs opened YES/NO: N\A

## 2019-07-11 NOTE — CARE PLAN
Problem: Safety  Goal: Will remain free from falls    Intervention: Implement fall precautions  Treaded slipper socks when out of bed, bed locked and in lowest position, pt educated to call prior to getting out of bed/out of her chair/off of the commode, room close to nursing station.      Problem: Infection  Goal: Will remain free from infection    Intervention: Assess signs and symptoms of infection  Temperature, WBC, etc monitored. Infection preventions methods implemented like HH prior to and after each pt encounter, scrubbing the hub of IV lines, etc.

## 2019-07-11 NOTE — PROGRESS NOTES
Trauma / Surgical Daily Progress Note    Date of Service  7/11/2019    Chief Complaint  68 y.o. female admitted 7/9/2019 with ICH (intracerebral hemorrhage) (HCC)    Interval Events  Repeat head CT this morning  Neurosurgery note reviewed  Plan for repeat head CT this afternoon  Tertiary survey completed  RAP score 6  SBIRT negative    - Left shoulder xray  - Wean oxygen  - Transfer to neuro or neurosurgery  - Anticipate home next 24 to 48 hrs    Review of Systems  Review of Systems   Constitutional: Negative for chills and fever.   HENT: Negative for hearing loss.    Eyes: Negative for blurred vision and double vision.   Respiratory: Negative for shortness of breath.    Cardiovascular: Negative for chest pain.   Gastrointestinal: Negative for abdominal pain, constipation (BM 7/10), nausea and vomiting.   Genitourinary: Negative for dysuria (voiding).   Musculoskeletal: Positive for joint pain (left shoulder) and myalgias. Negative for back pain and neck pain.   Skin: Negative for rash.   Neurological: Negative for dizziness, tingling, sensory change, speech change, focal weakness and headaches.   Psychiatric/Behavioral: Negative for substance abuse.        Vital Signs  Temp:  [36.8 °C (98.2 °F)-38.1 °C (100.5 °F)] 36.9 °C (98.5 °F)  Pulse:  [] 87  Resp:  [13-74] 33  SpO2:  [91 %-98 %] 96 %    Physical Exam  Physical Exam   Constitutional: She is oriented to person, place, and time. She appears well-developed. No distress. Nasal cannula in place.   HENT:   Head: Normocephalic and atraumatic.   Eyes: Pupils are equal, round, and reactive to light. Conjunctivae and EOM are normal.   Neck: Normal range of motion. Neck supple.   Pulmonary/Chest: Effort normal. No respiratory distress.   Abdominal: Soft. She exhibits no distension. There is no tenderness. There is no guarding.   Musculoskeletal:   Ambulatory   Neurological: She is alert and oriented to person, place, and time. GCS eye subscore is 4. GCS verbal  subscore is 5. GCS motor subscore is 6.   Skin: Skin is warm and dry.   Psychiatric: She has a normal mood and affect. Her behavior is normal.   Nursing note and vitals reviewed.      Laboratory  Recent Results (from the past 24 hour(s))   Magnesium: Every Monday and Thursday AM    Collection Time: 07/11/19  3:46 AM   Result Value Ref Range    Magnesium 1.1 (L) 1.5 - 2.5 mg/dL   Phosphorus: Every Monday and Thursday AM    Collection Time: 07/11/19  3:46 AM   Result Value Ref Range    Phosphorus 3.4 2.5 - 4.5 mg/dL       Fluids    Intake/Output Summary (Last 24 hours) at 07/11/19 1346  Last data filed at 07/11/19 1200   Gross per 24 hour   Intake             1850 ml   Output              835 ml   Net             1015 ml       Core Measures & Quality Metrics  Labs reviewed, Medications reviewed and Radiology images reviewed  Ross catheter: No Ross      DVT Prophylaxis: Contraindicated - High bleeding risk  DVT prophylaxis - mechanical: SCDs  Ulcer prophylaxis: Not indicated    Assessed for rehab: Patient returned to prior level of function, rehabilitation not indicated at this time    Total Score: 6    ETOH Screening  CAGE Score: 1  Assessment complete date: 7/11/2019        Assessment/Plan  Traumatic hemorrhage of right cerebrum without loss of consciousness (HCC)- (present on admission)   Assessment & Plan    Traumatic hemorrhage of right cerebrum.  Repeat head CT completed.  Additional head CT ordered.  Non-operative management.   Post traumatic pharmacologic seizure prophylaxis for 1 week.  Speech Language Pathology cognitive evaluation.  Kashmir Parra MD. Neurosurgery.     Left shoulder pain- (present on admission)   Assessment & Plan    History of chronic left shoulder pain with decreased ROM.  Persistent pain.  7/11 Dedicated films pending.     Contraindication to deep vein thrombosis (DVT) prophylaxis- (present on admission)   Assessment & Plan    Initial systemic anticoagulation contraindicated secondary to  elevated bleeding risk.  RAP score 3.  7/10 Surveillance venous duplex scanning ordered.  7/10 Trauma screening bilateral lower extremity venous duplex negative for above knee DVT.     UTI (urinary tract infection)- (present on admission)   Assessment & Plan    Admission UA positive.  Antibiotics initiated.     Acute alcohol intoxication (HCC)- (present on admission)   Assessment & Plan    Admission blood alcohol level of 0.17. BA at [facility] 0.25.  Trauma alcohol withdrawal protocol initiated.  Alcohol withdrawal surveillance.  7/11 Brief intervention completed.     Hypertension- (present on admission)   Assessment & Plan    Chronic condition treated with Lisinopril.  Resume maintenance medication.     Hypothyroidism- (present on admission)   Assessment & Plan    Chronic condition treated with Levothyroxine.  Resume maintenance medication.     Trauma- (present on admission)   Assessment & Plan    Ground level fall. Intoxicated fell in bathroom. Also has a UTI.  T-5000 Activation. Transferred from [facility].  Hazel Cano MD. Trauma Surgery.         Discussed patient condition with RN, Patient and trauma surgery. Dr. Roth  35 minutes  I have personally evaluated this patient at the bedside and performed a global high level assessment to allow clinical decision making regarding the patient's risk tolerance for transfer to a lower level of care in this in-house risk environment. This decision and takes into account the patient's current active clinical problems and  Comorbidities. This includes:  Complete neurologic assessment  Assessment of respiratory function considering the need her ongoing therapies and the patient's tolerance'  Cardiovascular status  Coordination of care needs  No evidence of alcohol withdrawal   Patient seen, data reviewed and discussed.  Agree with assessment and plan.   The APN and I have collaborated in the patient assessment chart documentation and care plan. The clinical  decision making , diagnoses and management are mine and the APN has assisted in the creation of the clinical document . The [facility]N has no independent billing for this patient.

## 2019-07-11 NOTE — CARE PLAN
Problem: Knowledge Deficit  Goal: Knowledge of the prescribed therapeutic regimen will improve  Outcome: PROGRESSING AS EXPECTED  Discussed continued plan of care with pt and family at bedside. Answered all questions.    Problem: Pain Management  Goal: Pain level will decrease to patient's comfort goal  Outcome: PROGRESSING SLOWER THAN EXPECTED  Pain assessed q2h and PRN. Pt medicated per MAR. Non-pharmacologic measures utilized.

## 2019-07-11 NOTE — PROGRESS NOTES
Neurosurgery Progress Note    Subjective:  Sitting at edge of bed, denies headache,visual changes    Exam:  AO x 3, PERRL, speech clear, fluent, RUE > LUE tremors noted, present pre-admission  Left shoulder deficit, chronic r/t shoulder surgery, otherwise no focal defict    CT 7/10/19:  merrhoage right posterior horn ventricle, similar to prior study,    Pulse  Av.5  Min: 78  Max: 120  Resp  Av.9  Min: 13  Max: 74  Temp  Av.2 °C (99 °F)  Min: 36.6 °C (97.9 °F)  Max: 38.1 °C (100.5 °F)  SpO2  Av.1 %  Min: 91 %  Max: 98 %    No Data Recorded    Recent Labs      07/09/19   1942  07/10/19   0535   WBC  5.0  4.2*   RBC  3.83*  3.84*   HEMOGLOBIN  14.3  14.0   HEMATOCRIT  40.2  41.1   MCV  105.0*  107.0*   MCH  37.3*  36.5*   MCHC  35.6*  34.1   RDW  46.1  47.4   PLATELETCT  219  206   MPV  9.4  9.5     Recent Labs      07/09/19   1942  07/10/19   0535   SODIUM  134*  133*   POTASSIUM  3.7  4.2   CHLORIDE  97  101   CO2  24  22   GLUCOSE  85  92   BUN  5*  6*   CREATININE  0.72  0.69   CALCIUM  9.1  9.0     Recent Labs      19   APTT  22.2*   INR  1.04           Intake/Output       07/10/19 0700 - 19 - 19 0659       Total 190059 Total       Intake    P.O.  450  -- 450  250  -- 250    P.O. 450 -- 450 250 -- 250    I.V.  1200  400 1600  --  -- --    Volume (mL) (NS infusion) 2336 151 4788 -- -- --    IV Piggyback  --  100 100  0  -- 0    Volume (mL) (levETIRAcetam (KEPPRA) 500 mg in  mL IVPB) -- 100 100 0 -- 0    Total Intake 2456 610 3618 250 -- 250       Output    Urine  875  160 1035  250  -- 250    Number of Times Voided 0 x 9 x 9 x 1 x -- 1 x    Urine Void (mL)  250 -- 250    Stool  0  -- 0  0  -- 0    Number of Times Stooled 0 x 0 x 0 x 0 x -- 0 x    Measurable Stool (mL) 0 -- 0 0 -- 0    Total Output  250 -- 250       Net I/O      0 -- 0            Intake/Output Summary (Last 24  hours) at 07/11/19 1012  Last data filed at 07/11/19 1000   Gross per 24 hour   Intake             1900 ml   Output              785 ml   Net             1115 ml       $ Bladder Scan Results (mL): 150    • sulfamethoxazole-trimethoprim  1 Tab Q12HRS   • lisinopril  40 mg Q DAY   • levothyroxine  75 mcg AM ES   • labetalol  10 mg Q4HRS PRN   • oxyCODONE immediate-release  2.5 mg Q4HRS PRN    Or   • oxyCODONE immediate-release  5 mg Q4HRS PRN   • lidocaine  1 Patch Q24HR   • Respiratory Care per Protocol   Continuous RT   • Pharmacy Consult Request  1 Each PHARMACY TO DOSE   • docusate sodium  100 mg BID   • senna-docusate  1 Tab Nightly   • senna-docusate  1 Tab Q24HRS PRN   • polyethylene glycol/lytes  1 Packet BID   • magnesium hydroxide  30 mL DAILY   • bisacodyl  10 mg Q24HRS PRN   • fleet  1 Each Once PRN   • fentaNYL  50 mcg Q HOUR PRN   • LORazepam  1-2 mg Q HOUR PRN    Or   • LORazepam  3-4 mg Q HOUR PRN   • ondansetron  4 mg Q4HRS PRN   • levETIRAcetam (KEPPRA) IV  500 mg BID       Assessment and Plan:  Hospital day # 3:   Small right occipital ICH r/t ground level fall  Prophylactic anticoagulation: no         Start date/time: tbd    Keppra seizure prophylaxis    Hyponatremia: goal: mid to high normal range      Will repeat head CT: further plan on care based on results  D/w Dr. Parra

## 2019-07-12 PROBLEM — F10.10 ALCOHOL ABUSE: Status: ACTIVE | Noted: 2019-07-09

## 2019-07-12 LAB
ALBUMIN SERPL BCP-MCNC: 2.9 G/DL (ref 3.2–4.9)
ALBUMIN/GLOB SERPL: 1.1 G/DL
ALP SERPL-CCNC: 82 U/L (ref 30–99)
ALT SERPL-CCNC: 56 U/L (ref 2–50)
ANION GAP SERPL CALC-SCNC: 10 MMOL/L (ref 0–11.9)
AST SERPL-CCNC: 55 U/L (ref 12–45)
BILIRUB SERPL-MCNC: 0.8 MG/DL (ref 0.1–1.5)
BUN SERPL-MCNC: 6 MG/DL (ref 8–22)
CALCIUM SERPL-MCNC: 8.7 MG/DL (ref 8.5–10.5)
CHLORIDE SERPL-SCNC: 103 MMOL/L (ref 96–112)
CO2 SERPL-SCNC: 20 MMOL/L (ref 20–33)
CREAT SERPL-MCNC: 0.76 MG/DL (ref 0.5–1.4)
GLOBULIN SER CALC-MCNC: 2.7 G/DL (ref 1.9–3.5)
GLUCOSE SERPL-MCNC: 131 MG/DL (ref 65–99)
MAGNESIUM SERPL-MCNC: 1.9 MG/DL (ref 1.5–2.5)
POTASSIUM SERPL-SCNC: 3.7 MMOL/L (ref 3.6–5.5)
PROT SERPL-MCNC: 5.6 G/DL (ref 6–8.2)
SODIUM SERPL-SCNC: 133 MMOL/L (ref 135–145)

## 2019-07-12 PROCEDURE — 700105 HCHG RX REV CODE 258: Performed by: SURGERY

## 2019-07-12 PROCEDURE — 700102 HCHG RX REV CODE 250 W/ 637 OVERRIDE(OP): Performed by: NURSE PRACTITIONER

## 2019-07-12 PROCEDURE — 80053 COMPREHEN METABOLIC PANEL: CPT

## 2019-07-12 PROCEDURE — A9270 NON-COVERED ITEM OR SERVICE: HCPCS | Performed by: SURGERY

## 2019-07-12 PROCEDURE — A9270 NON-COVERED ITEM OR SERVICE: HCPCS | Performed by: NURSE PRACTITIONER

## 2019-07-12 PROCEDURE — 700112 HCHG RX REV CODE 229: Performed by: SURGERY

## 2019-07-12 PROCEDURE — 700111 HCHG RX REV CODE 636 W/ 250 OVERRIDE (IP): Performed by: NURSE PRACTITIONER

## 2019-07-12 PROCEDURE — 700101 HCHG RX REV CODE 250: Performed by: SURGERY

## 2019-07-12 PROCEDURE — 700111 HCHG RX REV CODE 636 W/ 250 OVERRIDE (IP): Performed by: SURGERY

## 2019-07-12 PROCEDURE — 770022 HCHG ROOM/CARE - ICU (200)

## 2019-07-12 PROCEDURE — 700101 HCHG RX REV CODE 250: Performed by: NURSE PRACTITIONER

## 2019-07-12 PROCEDURE — 83735 ASSAY OF MAGNESIUM: CPT

## 2019-07-12 PROCEDURE — 302128 INFUSION PUMP W/POLE: Performed by: SURGERY

## 2019-07-12 PROCEDURE — 700102 HCHG RX REV CODE 250 W/ 637 OVERRIDE(OP): Performed by: SURGERY

## 2019-07-12 RX ORDER — SODIUM CHLORIDE 1 G/1
1 TABLET ORAL
Status: DISCONTINUED | OUTPATIENT
Start: 2019-07-12 | End: 2019-07-13

## 2019-07-12 RX ORDER — PHENOBARBITAL SODIUM 130 MG/ML
130 INJECTION INTRAMUSCULAR; INTRAVENOUS
Status: DISCONTINUED | OUTPATIENT
Start: 2019-07-12 | End: 2019-07-13

## 2019-07-12 RX ORDER — SODIUM CHLORIDE 9 MG/ML
INJECTION, SOLUTION INTRAVENOUS
Status: DISCONTINUED
Start: 2019-07-12 | End: 2019-07-12

## 2019-07-12 RX ORDER — PHENOBARBITAL SODIUM 130 MG/ML
260 INJECTION INTRAMUSCULAR; INTRAVENOUS
Status: DISCONTINUED | OUTPATIENT
Start: 2019-07-12 | End: 2019-07-13

## 2019-07-12 RX ORDER — ACETAMINOPHEN 325 MG/1
650 TABLET ORAL EVERY 6 HOURS PRN
Status: DISCONTINUED | OUTPATIENT
Start: 2019-07-12 | End: 2019-07-15

## 2019-07-12 RX ADMIN — LISINOPRIL 40 MG: 20 TABLET ORAL at 04:10

## 2019-07-12 RX ADMIN — LORAZEPAM 2 MG: 2 INJECTION INTRAMUSCULAR; INTRAVENOUS at 16:58

## 2019-07-12 RX ADMIN — LORAZEPAM 2 MG: 2 INJECTION INTRAMUSCULAR; INTRAVENOUS at 16:05

## 2019-07-12 RX ADMIN — LORAZEPAM 1.5 MG: 2 INJECTION INTRAMUSCULAR; INTRAVENOUS at 00:05

## 2019-07-12 RX ADMIN — THERA TABS 1 TABLET: TAB at 04:11

## 2019-07-12 RX ADMIN — PHENOBARBITAL SODIUM 130 MG: 130 INJECTION INTRAMUSCULAR; INTRAVENOUS at 18:30

## 2019-07-12 RX ADMIN — CHLORDIAZEPOXIDE HYDROCHLORIDE 50 MG: 25 CAPSULE ORAL at 11:10

## 2019-07-12 RX ADMIN — SODIUM CHLORIDE 500 MG: 9 INJECTION, SOLUTION INTRAVENOUS at 18:10

## 2019-07-12 RX ADMIN — THIAMINE HYDROCHLORIDE: 100 INJECTION, SOLUTION INTRAMUSCULAR; INTRAVENOUS at 02:32

## 2019-07-12 RX ADMIN — SULFAMETHOXAZOLE AND TRIMETHOPRIM 1 TABLET: 800; 160 TABLET ORAL at 04:11

## 2019-07-12 RX ADMIN — FOLIC ACID 1 MG: 1 TABLET ORAL at 04:11

## 2019-07-12 RX ADMIN — SODIUM CHLORIDE TAB 1 GM 1 G: 1 TAB at 11:10

## 2019-07-12 RX ADMIN — Medication 100 MG: at 04:11

## 2019-07-12 RX ADMIN — LIDOCAINE 1 PATCH: 50 PATCH TOPICAL at 23:00

## 2019-07-12 RX ADMIN — LORAZEPAM 2 MG: 2 INJECTION INTRAMUSCULAR; INTRAVENOUS at 16:45

## 2019-07-12 RX ADMIN — SODIUM CHLORIDE 500 MG: 9 INJECTION, SOLUTION INTRAVENOUS at 06:47

## 2019-07-12 RX ADMIN — LORAZEPAM 2 MG: 2 INJECTION INTRAMUSCULAR; INTRAVENOUS at 04:14

## 2019-07-12 RX ADMIN — CHLORDIAZEPOXIDE HYDROCHLORIDE 50 MG: 25 CAPSULE ORAL at 04:07

## 2019-07-12 RX ADMIN — LORAZEPAM 1 MG: 1 TABLET ORAL at 07:27

## 2019-07-12 RX ADMIN — SULFAMETHOXAZOLE AND TRIMETHOPRIM 1 TABLET: 800; 160 TABLET ORAL at 17:37

## 2019-07-12 RX ADMIN — PHENOBARBITAL SODIUM 260 MG: 130 INJECTION INTRAMUSCULAR; INTRAVENOUS at 19:22

## 2019-07-12 RX ADMIN — LORAZEPAM 2 MG: 2 INJECTION INTRAMUSCULAR; INTRAVENOUS at 16:25

## 2019-07-12 RX ADMIN — LEVOTHYROXINE SODIUM 75 MCG: 75 TABLET ORAL at 04:11

## 2019-07-12 RX ADMIN — LORAZEPAM 1 MG: 1 TABLET ORAL at 14:45

## 2019-07-12 RX ADMIN — CHLORDIAZEPOXIDE HYDROCHLORIDE 50 MG: 25 CAPSULE ORAL at 17:40

## 2019-07-12 RX ADMIN — SODIUM CHLORIDE TAB 1 GM 1 G: 1 TAB at 17:37

## 2019-07-12 RX ADMIN — DOCUSATE SODIUM 100 MG: 100 CAPSULE, LIQUID FILLED ORAL at 17:38

## 2019-07-12 RX ADMIN — LORAZEPAM 1 MG: 2 INJECTION INTRAMUSCULAR; INTRAVENOUS at 03:46

## 2019-07-12 RX ADMIN — LORAZEPAM 1 MG: 2 INJECTION INTRAMUSCULAR; INTRAVENOUS at 05:10

## 2019-07-12 RX ADMIN — CHLORDIAZEPOXIDE HYDROCHLORIDE 50 MG: 25 CAPSULE ORAL at 00:00

## 2019-07-12 ASSESSMENT — LIFESTYLE VARIABLES
ORIENTATION AND CLOUDING OF SENSORIUM: CANNOT DO SERIAL ADDITIONS OR IS UNCERTAIN ABOUT DATE
TOTAL SCORE: VERY MILD ITCHING, PINS AND NEEDLES SENSATION, BURNING OR NUMBNESS
AUDITORY DISTURBANCES: NOT PRESENT
ANXIETY: MODERATELY ANXIOUS OR GUARDED, SO ANXIETY IS INFERRED
ORIENTATION AND CLOUDING OF SENSORIUM: ORIENTED AND CAN DO SERIAL ADDITIONS
TOTAL SCORE: 12
HEADACHE, FULLNESS IN HEAD: MODERATE
HEADACHE, FULLNESS IN HEAD: NOT PRESENT
AUDITORY DISTURBANCES: VERY MILD HARSHNESS OR ABILITY TO FRIGHTEN
TOTAL SCORE: VERY MILD ITCHING, PINS AND NEEDLES SENSATION, BURNING OR NUMBNESS
HEADACHE, FULLNESS IN HEAD: MODERATE
VISUAL DISTURBANCES: VERY MILD SENSITIVITY
TREMOR: *
ORIENTATION AND CLOUDING OF SENSORIUM: DISORIENTED FOR PLACE AND / OR PERSON
AGITATION: SOMEWHAT MORE THAN NORMAL ACTIVITY
VISUAL DISTURBANCES: MODERATELY SEVERE HALLUCINATIONS
HEADACHE, FULLNESS IN HEAD: MODERATE
ANXIETY: MILDLY ANXIOUS
ANXIETY: MODERATELY ANXIOUS OR GUARDED, SO ANXIETY IS INFERRED
PAROXYSMAL SWEATS: *
TREMOR: MODERATE TREMOR WITH ARMS EXTENDED
ORIENTATION AND CLOUDING OF SENSORIUM: ORIENTED AND CAN DO SERIAL ADDITIONS
TREMOR: MODERATE TREMOR WITH ARMS EXTENDED
ORIENTATION AND CLOUDING OF SENSORIUM: ORIENTED AND CAN DO SERIAL ADDITIONS
TREMOR: *
AGITATION: NORMAL ACTIVITY
ORIENTATION AND CLOUDING OF SENSORIUM: CANNOT DO SERIAL ADDITIONS OR IS UNCERTAIN ABOUT DATE
HEADACHE, FULLNESS IN HEAD: NOT PRESENT
NAUSEA AND VOMITING: NO NAUSEA AND NO VOMITING
VISUAL DISTURBANCES: MILD SENSITIVITY
VISUAL DISTURBANCES: NOT PRESENT
TREMOR: *
PAROXYSMAL SWEATS: BARELY PERCEPTIBLE SWEATING. PALMS MOIST
AGITATION: SOMEWHAT MORE THAN NORMAL ACTIVITY
TREMOR: *
PAROXYSMAL SWEATS: *
AUDITORY DISTURBANCES: MILD HARSHNESS OR ABILITY TO FRIGHTEN
NAUSEA AND VOMITING: *
ANXIETY: MILDLY ANXIOUS
TOTAL SCORE: 23
ANXIETY: MODERATELY ANXIOUS OR GUARDED, SO ANXIETY IS INFERRED
ANXIETY: MILDLY ANXIOUS
HEADACHE, FULLNESS IN HEAD: MILD
ORIENTATION AND CLOUDING OF SENSORIUM: ORIENTED AND CAN DO SERIAL ADDITIONS
TOTAL SCORE: VERY MILD ITCHING, PINS AND NEEDLES SENSATION, BURNING OR NUMBNESS
ANXIETY: *
NAUSEA AND VOMITING: MILD NAUSEA WITH NO VOMITING
AUDITORY DISTURBANCES: VERY MILD HARSHNESS OR ABILITY TO FRIGHTEN
PAROXYSMAL SWEATS: *
VISUAL DISTURBANCES: NOT PRESENT
NAUSEA AND VOMITING: MILD NAUSEA WITH NO VOMITING
ANXIETY: *
TOTAL SCORE: 14
TOTAL SCORE: 11
TOTAL SCORE: VERY MILD ITCHING, PINS AND NEEDLES SENSATION, BURNING OR NUMBNESS
AGITATION: MODERATELY FIDGETY AND RESTLESS
TOTAL SCORE: 41
NAUSEA AND VOMITING: MILD NAUSEA WITH NO VOMITING
TOTAL SCORE: 7
ANXIETY: MODERATELY ANXIOUS OR GUARDED, SO ANXIETY IS INFERRED
AGITATION: PACES BACK AND FORTH DURING MOST OF THE INTERVIEW OR CONSTANTLY THRASHES ABOUT.
AGITATION: MODERATELY FIDGETY AND RESTLESS
PAROXYSMAL SWEATS: BEADS OF SWEAT OBVIOUS ON FOREHEAD
TREMOR: *
AUDITORY DISTURBANCES: NOT PRESENT
AUDITORY DISTURBANCES: VERY MILD HARSHNESS OR ABILITY TO FRIGHTEN
AUDITORY DISTURBANCES: NOT PRESENT
HEADACHE, FULLNESS IN HEAD: MILD
VISUAL DISTURBANCES: NOT PRESENT
ANXIETY: MODERATELY ANXIOUS OR GUARDED, SO ANXIETY IS INFERRED
PAROXYSMAL SWEATS: BARELY PERCEPTIBLE SWEATING. PALMS MOIST
TOTAL SCORE: VERY MILD ITCHING, PINS AND NEEDLES SENSATION, BURNING OR NUMBNESS
AUDITORY DISTURBANCES: NOT PRESENT
AUDITORY DISTURBANCES: MILD HARSHNESS OR ABILITY TO FRIGHTEN
PAROXYSMAL SWEATS: *
VISUAL DISTURBANCES: NOT PRESENT
PAROXYSMAL SWEATS: BARELY PERCEPTIBLE SWEATING. PALMS MOIST
TREMOR: *
VISUAL DISTURBANCES: MILD SENSITIVITY
HEADACHE, FULLNESS IN HEAD: MODERATE
TOTAL SCORE: 8
NAUSEA AND VOMITING: NO NAUSEA AND NO VOMITING
PAROXYSMAL SWEATS: BARELY PERCEPTIBLE SWEATING. PALMS MOIST
NAUSEA AND VOMITING: NO NAUSEA AND NO VOMITING
VISUAL DISTURBANCES: NOT PRESENT
AGITATION: MODERATELY FIDGETY AND RESTLESS
AGITATION: MODERATELY FIDGETY AND RESTLESS
TREMOR: *
NAUSEA AND VOMITING: NO NAUSEA AND NO VOMITING
AUDITORY DISTURBANCES: SEVERE HALLUCINATIONS
TOTAL SCORE: MILD ITCHING, PINS AND NEEDLES SENSATION, BURNING OR NUMBNESS
HEADACHE, FULLNESS IN HEAD: NOT PRESENT
PAROXYSMAL SWEATS: BARELY PERCEPTIBLE SWEATING. PALMS MOIST
ORIENTATION AND CLOUDING OF SENSORIUM: ORIENTED AND CAN DO SERIAL ADDITIONS
TOTAL SCORE: 22
ORIENTATION AND CLOUDING OF SENSORIUM: CANNOT DO SERIAL ADDITIONS OR IS UNCERTAIN ABOUT DATE
ANXIETY: MODERATELY ANXIOUS OR GUARDED, SO ANXIETY IS INFERRED
AGITATION: SOMEWHAT MORE THAN NORMAL ACTIVITY
ORIENTATION AND CLOUDING OF SENSORIUM: ORIENTED AND CAN DO SERIAL ADDITIONS
AUDITORY DISTURBANCES: NOT PRESENT
PAROXYSMAL SWEATS: BARELY PERCEPTIBLE SWEATING. PALMS MOIST
TOTAL SCORE: 18
AGITATION: MODERATELY FIDGETY AND RESTLESS
ANXIETY: MILDLY ANXIOUS
VISUAL DISTURBANCES: NOT PRESENT
NAUSEA AND VOMITING: *
HEADACHE, FULLNESS IN HEAD: NOT PRESENT
TREMOR: *
TOTAL SCORE: VERY MILD ITCHING, PINS AND NEEDLES SENSATION, BURNING OR NUMBNESS
TOTAL SCORE: 8
AUDITORY DISTURBANCES: VERY MILD HARSHNESS OR ABILITY TO FRIGHTEN
VISUAL DISTURBANCES: MODERATE SENSITIVITY
NAUSEA AND VOMITING: NO NAUSEA AND NO VOMITING
AUDITORY DISTURBANCES: VERY MILD HARSHNESS OR ABILITY TO FRIGHTEN
TOTAL SCORE: 22
TREMOR: *
HEADACHE, FULLNESS IN HEAD: NOT PRESENT
TOTAL SCORE: 6
AUDITORY DISTURBANCES: VERY MILD HARSHNESS OR ABILITY TO FRIGHTEN
TREMOR: *
TREMOR: TREMOR NOT VISIBLE BUT CAN BE FELT, FINGERTIP TO FINGERTIP
NAUSEA AND VOMITING: *
TREMOR: *
AGITATION: SOMEWHAT MORE THAN NORMAL ACTIVITY
NAUSEA AND VOMITING: MILD NAUSEA WITH NO VOMITING
ORIENTATION AND CLOUDING OF SENSORIUM: DISORIENTED FOR PLACE AND / OR PERSON
VISUAL DISTURBANCES: NOT PRESENT
HEADACHE, FULLNESS IN HEAD: NOT PRESENT
AGITATION: SOMEWHAT MORE THAN NORMAL ACTIVITY
TREMOR: *
HEADACHE, FULLNESS IN HEAD: NOT PRESENT
VISUAL DISTURBANCES: NOT PRESENT
SUBSTANCE_ABUSE: 1
ORIENTATION AND CLOUDING OF SENSORIUM: ORIENTED AND CAN DO SERIAL ADDITIONS
NAUSEA AND VOMITING: NO NAUSEA AND NO VOMITING
AUDITORY DISTURBANCES: VERY MILD HARSHNESS OR ABILITY TO FRIGHTEN
TOTAL SCORE: VERY MILD ITCHING, PINS AND NEEDLES SENSATION, BURNING OR NUMBNESS
ANXIETY: MILDLY ANXIOUS
HEADACHE, FULLNESS IN HEAD: MODERATE
AGITATION: SOMEWHAT MORE THAN NORMAL ACTIVITY
PAROXYSMAL SWEATS: BARELY PERCEPTIBLE SWEATING. PALMS MOIST
ANXIETY: MILDLY ANXIOUS
TOTAL SCORE: SEVERE HALLUCINATIONS
VISUAL DISTURBANCES: NOT PRESENT
ORIENTATION AND CLOUDING OF SENSORIUM: CANNOT DO SERIAL ADDITIONS OR IS UNCERTAIN ABOUT DATE
TOTAL SCORE: 4
ANXIETY: MODERATELY ANXIOUS OR GUARDED, SO ANXIETY IS INFERRED
NAUSEA AND VOMITING: *
AGITATION: *
PAROXYSMAL SWEATS: *
HEADACHE, FULLNESS IN HEAD: NOT PRESENT
PAROXYSMAL SWEATS: BARELY PERCEPTIBLE SWEATING. PALMS MOIST
VISUAL DISTURBANCES: NOT PRESENT
ORIENTATION AND CLOUDING OF SENSORIUM: CANNOT DO SERIAL ADDITIONS OR IS UNCERTAIN ABOUT DATE
TOTAL SCORE: VERY MILD ITCHING, PINS AND NEEDLES SENSATION, BURNING OR NUMBNESS
TOTAL SCORE: VERY MILD ITCHING, PINS AND NEEDLES SENSATION, BURNING OR NUMBNESS
AGITATION: *
ORIENTATION AND CLOUDING OF SENSORIUM: ORIENTED AND CAN DO SERIAL ADDITIONS
TOTAL SCORE: 23
PAROXYSMAL SWEATS: *
TOTAL SCORE: 27
NAUSEA AND VOMITING: NO NAUSEA AND NO VOMITING

## 2019-07-12 ASSESSMENT — ENCOUNTER SYMPTOMS
TINGLING: 0
CONSTIPATION: 0
MYALGIAS: 1
SENSORY CHANGE: 0
DOUBLE VISION: 0
FEVER: 0
TREMORS: 1
DIAPHORESIS: 0
BACK PAIN: 0
VOMITING: 0
NAUSEA: 0
NECK PAIN: 0
SHORTNESS OF BREATH: 0
CHILLS: 0
FOCAL WEAKNESS: 0
BLURRED VISION: 0
ABDOMINAL PAIN: 0
HEADACHES: 0
DIZZINESS: 0
SPEECH CHANGE: 0

## 2019-07-12 NOTE — PROGRESS NOTES
Trauma / Surgical Daily Progress Note    Date of Service  7/12/2019    Chief Complaint  68 y.o. female admitted 7/9/2019 with ICH (intracerebral hemorrhage) (HCC)    Interval Events  Pt up to chair, family at bedside  Tremulous and confused  CIWA protocol initiated last night  Brief intervention repeated and pt more forthcoming but still not answering all questions truthfully per family  SBIRT positive  Case reviewed with Taylor OCHOA, repeat head CT stable, no further neuro recommendations    - Continue CIWA  - Low threshold for higher level of care  - SNF referral placed    Review of Systems  Review of Systems   Constitutional: Negative for chills, diaphoresis and fever.   HENT: Negative for hearing loss.    Eyes: Negative for blurred vision and double vision.   Respiratory: Negative for shortness of breath.    Cardiovascular: Negative for chest pain.   Gastrointestinal: Negative for abdominal pain, constipation (BM 7/11), nausea and vomiting.   Genitourinary: Negative for dysuria (voiding).   Musculoskeletal: Positive for joint pain (left shoulder) and myalgias. Negative for back pain and neck pain.   Skin: Negative for rash.   Neurological: Positive for tremors. Negative for dizziness, tingling, sensory change, speech change, focal weakness and headaches.   Psychiatric/Behavioral: Positive for substance abuse.        Vital Signs  Temp:  [36.7 °C (98.1 °F)-38.3 °C (100.9 °F)] 36.7 °C (98.1 °F)  Pulse:  [] 106  Resp:  [17-53] 17  BP: (147-161)/() 149/108  SpO2:  [92 %-100 %] 95 %    Physical Exam  Physical Exam   Constitutional: She appears well-developed. No distress. Nasal cannula in place.   Neck: Neck supple.   Pulmonary/Chest: Effort normal. No respiratory distress.   Musculoskeletal:   Ambulatory with FWW   Neurological: She is alert. GCS eye subscore is 4. GCS verbal subscore is 4. GCS motor subscore is 6.   Skin: Skin is warm and dry.   Nursing note and vitals  reviewed.      Laboratory  Recent Results (from the past 24 hour(s))   Magnesium: Every Monday and Thursday AM    Collection Time: 07/12/19  4:40 AM   Result Value Ref Range    Magnesium 1.9 1.5 - 2.5 mg/dL   Comp Metabolic Panel    Collection Time: 07/12/19  4:40 AM   Result Value Ref Range    Sodium 133 (L) 135 - 145 mmol/L    Potassium 3.7 3.6 - 5.5 mmol/L    Chloride 103 96 - 112 mmol/L    Co2 20 20 - 33 mmol/L    Anion Gap 10.0 0.0 - 11.9    Glucose 131 (H) 65 - 99 mg/dL    Bun 6 (L) 8 - 22 mg/dL    Creatinine 0.76 0.50 - 1.40 mg/dL    Calcium 8.7 8.5 - 10.5 mg/dL    AST(SGOT) 55 (H) 12 - 45 U/L    ALT(SGPT) 56 (H) 2 - 50 U/L    Alkaline Phosphatase 82 30 - 99 U/L    Total Bilirubin 0.8 0.1 - 1.5 mg/dL    Albumin 2.9 (L) 3.2 - 4.9 g/dL    Total Protein 5.6 (L) 6.0 - 8.2 g/dL    Globulin 2.7 1.9 - 3.5 g/dL    A-G Ratio 1.1 g/dL   ESTIMATED GFR    Collection Time: 07/12/19  4:40 AM   Result Value Ref Range    GFR If African American >60 >60 mL/min/1.73 m 2    GFR If Non African American >60 >60 mL/min/1.73 m 2       Fluids    Intake/Output Summary (Last 24 hours) at 07/12/19 1048  Last data filed at 07/11/19 1800   Gross per 24 hour   Intake           710.84 ml   Output             1150 ml   Net          -439.16 ml       Core Measures & Quality Metrics  Labs reviewed, Medications reviewed and Radiology images reviewed  Ross catheter: No Ross      DVT Prophylaxis: Contraindicated - High bleeding risk  DVT prophylaxis - mechanical: SCDs  Ulcer prophylaxis: Not indicated    Assessed for rehab: Patient returned to prior level of function, rehabilitation not indicated at this time    Total Score: 6    ETOH Screening  CAGE Score: 1  Assessment complete date: 7/12/2019  Intervention: yes. Patient response to intervention: Drinks three glasses of vodka daily, for pour counts to 10, or beer for the past few years. Denies tobacco, marijuana, illicit drugs or prescription drug abuse. Per family, pt has large quantities of  valium, hydrocodone and an unidentified bag of pills [place]. Family states she has also be to rehab 3 times for alcohol..   Patient does not demonstrate understanding of intervention. Patient agrees to follow-up.   has been contacted. Follow up with: PCP, [facility]  Total ETOH intervention time: greater than 30 minutes      Assessment/Plan  Alcohol abuse- (present on admission)   Assessment & Plan    Admission blood alcohol level of 0.17. BA at [facility] 0.25.  7/11 Brief intervention completed.   - Withdrawal symptoms noted overnight. CIWA, rally bag and multivitamins and Librium initiated.  7/12 Brief intervention completed again. Pt more forthcoming this assessment and family at bedside.     Contraindication to deep vein thrombosis (DVT) prophylaxis- (present on admission)   Assessment & Plan    Initial systemic anticoagulation contraindicated secondary to elevated bleeding risk.  RAP score 3.  7/10 Surveillance venous duplex scanning ordered.  7/10 Trauma screening bilateral lower extremity venous duplex negative for above knee DVT.  Ambulate TID.     Traumatic hemorrhage of right cerebrum without loss of consciousness (HCC)- (present on admission)   Assessment & Plan    Traumatic hemorrhage of right cerebrum.  Repeat head CT completed.  Additional head CT stable.  Non-operative management.   Post traumatic pharmacologic seizure prophylaxis for 1 week.  Speech Language Pathology cognitive evaluation.  Kashmir Parra MD. Neurosurgery (sign off 7/12).     UTI (urinary tract infection)- (present on admission)   Assessment & Plan    Admission UA positive.  Antibiotics initiated.     Left shoulder pain- (present on admission)   Assessment & Plan    History of chronic left shoulder pain with decreased ROM.  Persistent pain.  7/11 Dedicated films negative for acute injury.     Hypertension- (present on admission)   Assessment & Plan    Chronic condition treated with  Lisinopril.  Resume maintenance medication.     Hypothyroidism- (present on admission)   Assessment & Plan    Chronic condition treated with Levothyroxine.  Resume maintenance medication.     Trauma- (present on admission)   Assessment & Plan    Ground level fall. Intoxicated fell in bathroom. Also has a UTI.  T-5000 Activation. Transferred from [facility].  Hazel Cano MD. Trauma Surgery.         Discussed patient condition with Family, RN, , Patient and trauma surgery.Dr. Cano

## 2019-07-12 NOTE — PROGRESS NOTES
2 RN skin check complete.   Devices in place  - SCDs.  Skin assessed under devices intact.    Bruising left shoulder, right leg. Healed abrasion left knee.  Bruising right arm.   No skin tears/pressure injuries observed at this time.

## 2019-07-12 NOTE — DISCHARGE PLANNING
note:  LVM for daughter Katarina re: discharge planning.     Addendum:  Met with daughter who was upset about talking about discharge planning. She calmed down after a few minutes and said gave choices for a blanket referral in [facility]. Faxed to [facility] ED.     Pt is not ready for transfer per RN since she is under [facility] protocol.

## 2019-07-12 NOTE — PROGRESS NOTES
Neurosurgery Progress Note    Subjective:  Became increasingly confused and agitated last light  Per chart review and d/w Therese Amos, trauma APRN: patient now admitting to significant alcohol abuse and [place] opioids [place]. Had multiple rehab stays.     Exam:  AOx3, PERRL, no nystagmus, pleasant, conversant (b) UE tremors noted again, left deltoid deficit r/t remote shoulder injury/surgery, rest of motor exam grossly intact,    CT head 19:  RIGHT occipital periventricular white matter lesion highly suspicious for parenchymal hemorrhage, stable    BP  Min: 147/93  Max: 161/95  Pulse  Av.4  Min: 75  Max: 106  Resp  Av.7  Min: 17  Max: 53  Temp  Av.3 °C (99.1 °F)  Min: 36.7 °C (98.1 °F)  Max: 38.3 °C (100.9 °F)  SpO2  Av %  Min: 92 %  Max: 100 %    No Data Recorded    Recent Labs      07/09/19   1942  07/10/19   0535   WBC  5.0  4.2*   RBC  3.83*  3.84*   HEMOGLOBIN  14.3  14.0   HEMATOCRIT  40.2  41.1   MCV  105.0*  107.0*   MCH  37.3*  36.5*   MCHC  35.6*  34.1   RDW  46.1  47.4   PLATELETCT  219  206   MPV  9.4  9.5     Recent Labs      07/09/19   1942  07/10/19   0535  19   0440   SODIUM  134*  133*  133*   POTASSIUM  3.7  4.2  3.7   CHLORIDE  97  101  103   CO2  24  22  20   GLUCOSE  85  92  131*   BUN  5*  6*  6*   CREATININE  0.72  0.69  0.76   CALCIUM  9.1  9.0  8.7     Recent Labs      19   APTT  22.2*   INR  1.04           Intake/Output       19 - 19 - 19 0659      [id number] Total  Total       Intake    P.O.  600  -- 600  --  -- --    P.O. 600 -- 600 -- -- --    I.V.  94.2  -- 94.2  --  -- --    Magnesium Sulfate Volume 94.2 -- 94.2 -- -- --    IV Piggyback  266.7  -- 266.7  --  -- --    Volume (mL) (levETIRAcetam (KEPPRA) 500 mg in  mL IVPB) 266.7 -- 266.7 -- -- --    Total Intake 960.8 -- 960.8 -- -- --       Output    Urine  1650  -- 1650  --  -- --    Number  of Times Voided 5 x 2 x 7 x -- -- --    Urine Void (mL) 1650 -- 1650 -- -- --    Stool  0  -- 0  --  -- --    Number of Times Stooled 0 x -- 0 x -- -- --    Measurable Stool (mL) 0 -- 0 -- -- --    Total Output 1650 -- 1650 -- -- --       Net I/O     -689.2 -- -689.2 -- -- --            Intake/Output Summary (Last 24 hours) at 07/12/19 1054  Last data filed at 07/11/19 1800   Gross per 24 hour   Intake           710.84 ml   Output             1150 ml   Net          -439.16 ml            • acetaminophen  650 mg Q6HRS PRN   • chlordiazePOXIDE  50 mg Q6HRS    Followed by   • [START ON 7/13/2019] chlordiazePOXIDE  25 mg Q6HRS   • LORazepam  0.5 mg Q4HRS PRN   • LORazepam  1 mg Q4HRS PRN    Or   • LORazepam  0.5 mg Q4HRS PRN   • LORazepam  2 mg Q2HRS PRN    Or   • LORazepam  1 mg Q2HRS PRN   • LORazepam  3 mg Q HOUR PRN    Or   • LORazepam  1.5 mg Q HOUR PRN   • LORazepam  4 mg Q15 MIN PRN    Or   • LORazepam  2 mg Q15 MIN PRN   • thiamine  100 mg DAILY    And   • multivitamin  1 Tab DAILY    And   • folic acid  1 mg DAILY   • sulfamethoxazole-trimethoprim  1 Tab Q12HRS   • lisinopril  40 mg Q DAY   • levothyroxine  75 mcg AM ES   • labetalol  10 mg Q4HRS PRN   • lidocaine  1 Patch Q24HR   • Respiratory Care per Protocol   Continuous RT   • Pharmacy Consult Request  1 Each PHARMACY TO DOSE   • docusate sodium  100 mg BID   • senna-docusate  1 Tab Nightly   • senna-docusate  1 Tab Q24HRS PRN   • polyethylene glycol/lytes  1 Packet BID   • magnesium hydroxide  30 mL DAILY   • bisacodyl  10 mg Q24HRS PRN   • fleet  1 Each Once PRN   • ondansetron  4 mg Q4HRS PRN   • levETIRAcetam (KEPPRA) IV  500 mg BID       Assessment and Plan:  Hospital day # 4  Small right occipital ICH r/t ground level fall  Prophylactic anticoagulation: no         Start date/time: tbd     Keppra seizure prophylaxis     Hyponatremia: admission , today's  goal: mid to high normal range, will start salt tabs, adjust as indicated per  hospitalist team    Acute delirium/alcohol and drug withdrawal: PT/PTT WNL, CIWA, protocol initiated, Librium to be added.      Nsx will continue to monitor peripherally.
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
## 2019-07-12 NOTE — PROGRESS NOTES
Trauma Progress Note    Call placed to Gary, patients son, per Brigida's request  Updated and all questions answered

## 2019-07-13 ENCOUNTER — APPOINTMENT (OUTPATIENT)
Dept: RADIOLOGY | Facility: MEDICAL CENTER | Age: 69
DRG: 086 | End: 2019-07-13
Attending: SURGERY
Payer: MEDICARE

## 2019-07-13 LAB
ANION GAP SERPL CALC-SCNC: 10 MMOL/L (ref 0–11.9)
BASOPHILS # BLD AUTO: 0.4 % (ref 0–1.8)
BASOPHILS # BLD: 0.03 K/UL (ref 0–0.12)
BUN SERPL-MCNC: 6 MG/DL (ref 8–22)
CALCIUM SERPL-MCNC: 9.2 MG/DL (ref 8.5–10.5)
CHLORIDE SERPL-SCNC: 103 MMOL/L (ref 96–112)
CO2 SERPL-SCNC: 21 MMOL/L (ref 20–33)
CREAT SERPL-MCNC: 0.75 MG/DL (ref 0.5–1.4)
EOSINOPHIL # BLD AUTO: 0.29 K/UL (ref 0–0.51)
EOSINOPHIL NFR BLD: 4.2 % (ref 0–6.9)
ERYTHROCYTE [DISTWIDTH] IN BLOOD BY AUTOMATED COUNT: 46.5 FL (ref 35.9–50)
GLUCOSE SERPL-MCNC: 103 MG/DL (ref 65–99)
HCT VFR BLD AUTO: 38.4 % (ref 37–47)
HGB BLD-MCNC: 13.4 G/DL (ref 12–16)
IMM GRANULOCYTES # BLD AUTO: 0.01 K/UL (ref 0–0.11)
IMM GRANULOCYTES NFR BLD AUTO: 0.1 % (ref 0–0.9)
LYMPHOCYTES # BLD AUTO: 1.19 K/UL (ref 1–4.8)
LYMPHOCYTES NFR BLD: 17.2 % (ref 22–41)
MAGNESIUM SERPL-MCNC: 1.8 MG/DL (ref 1.5–2.5)
MCH RBC QN AUTO: 37.5 PG (ref 27–33)
MCHC RBC AUTO-ENTMCNC: 34.9 G/DL (ref 33.6–35)
MCV RBC AUTO: 107.6 FL (ref 81.4–97.8)
MONOCYTES # BLD AUTO: 0.85 K/UL (ref 0–0.85)
MONOCYTES NFR BLD AUTO: 12.3 % (ref 0–13.4)
NEUTROPHILS # BLD AUTO: 4.53 K/UL (ref 2–7.15)
NEUTROPHILS NFR BLD: 65.8 % (ref 44–72)
NRBC # BLD AUTO: 0 K/UL
NRBC BLD-RTO: 0 /100 WBC
PHOSPHATE SERPL-MCNC: 3.9 MG/DL (ref 2.5–4.5)
PLATELET # BLD AUTO: 151 K/UL (ref 164–446)
PMV BLD AUTO: 9.8 FL (ref 9–12.9)
POTASSIUM SERPL-SCNC: 3.6 MMOL/L (ref 3.6–5.5)
RBC # BLD AUTO: 3.57 M/UL (ref 4.2–5.4)
SODIUM SERPL-SCNC: 134 MMOL/L (ref 135–145)
WBC # BLD AUTO: 6.9 K/UL (ref 4.8–10.8)

## 2019-07-13 PROCEDURE — A9270 NON-COVERED ITEM OR SERVICE: HCPCS | Performed by: SURGERY

## 2019-07-13 PROCEDURE — 700112 HCHG RX REV CODE 229: Performed by: SURGERY

## 2019-07-13 PROCEDURE — 700102 HCHG RX REV CODE 250 W/ 637 OVERRIDE(OP): Performed by: SURGERY

## 2019-07-13 PROCEDURE — 770022 HCHG ROOM/CARE - ICU (200)

## 2019-07-13 PROCEDURE — 700111 HCHG RX REV CODE 636 W/ 250 OVERRIDE (IP): Performed by: SURGERY

## 2019-07-13 PROCEDURE — 83735 ASSAY OF MAGNESIUM: CPT

## 2019-07-13 PROCEDURE — 700102 HCHG RX REV CODE 250 W/ 637 OVERRIDE(OP): Performed by: NURSE PRACTITIONER

## 2019-07-13 PROCEDURE — 700111 HCHG RX REV CODE 636 W/ 250 OVERRIDE (IP): Performed by: NURSE PRACTITIONER

## 2019-07-13 PROCEDURE — 85025 COMPLETE CBC W/AUTO DIFF WBC: CPT

## 2019-07-13 PROCEDURE — 700105 HCHG RX REV CODE 258: Performed by: SURGERY

## 2019-07-13 PROCEDURE — 84100 ASSAY OF PHOSPHORUS: CPT

## 2019-07-13 PROCEDURE — 99233 SBSQ HOSP IP/OBS HIGH 50: CPT | Performed by: SURGERY

## 2019-07-13 PROCEDURE — A9270 NON-COVERED ITEM OR SERVICE: HCPCS | Performed by: NURSE PRACTITIONER

## 2019-07-13 PROCEDURE — 80048 BASIC METABOLIC PNL TOTAL CA: CPT

## 2019-07-13 PROCEDURE — 76937 US GUIDE VASCULAR ACCESS: CPT

## 2019-07-13 PROCEDURE — 700101 HCHG RX REV CODE 250: Performed by: SURGERY

## 2019-07-13 PROCEDURE — 05HY33Z INSERTION OF INFUSION DEVICE INTO UPPER VEIN, PERCUTANEOUS APPROACH: ICD-10-PCS | Performed by: SURGERY

## 2019-07-13 RX ORDER — LORAZEPAM 2 MG/ML
2 INJECTION INTRAMUSCULAR
Status: DISCONTINUED | OUTPATIENT
Start: 2019-07-13 | End: 2019-07-20

## 2019-07-13 RX ORDER — THIAMINE MONONITRATE (VIT B1) 100 MG
100 TABLET ORAL DAILY
Status: DISCONTINUED | OUTPATIENT
Start: 2019-07-13 | End: 2019-07-15

## 2019-07-13 RX ORDER — LISINOPRIL 20 MG/1
40 TABLET ORAL
Status: DISCONTINUED | OUTPATIENT
Start: 2019-07-14 | End: 2019-07-14

## 2019-07-13 RX ORDER — AMOXICILLIN 250 MG
1 CAPSULE ORAL NIGHTLY
Status: DISCONTINUED | OUTPATIENT
Start: 2019-07-13 | End: 2019-07-19

## 2019-07-13 RX ORDER — FOLIC ACID 1 MG/1
1 TABLET ORAL DAILY
Status: DISCONTINUED | OUTPATIENT
Start: 2019-07-13 | End: 2019-07-15

## 2019-07-13 RX ORDER — SODIUM CHLORIDE 1 G/1
1 TABLET ORAL
Status: DISCONTINUED | OUTPATIENT
Start: 2019-07-14 | End: 2019-07-15

## 2019-07-13 RX ORDER — POLYETHYLENE GLYCOL 3350 17 G/17G
1 POWDER, FOR SOLUTION ORAL 2 TIMES DAILY
Status: DISCONTINUED | OUTPATIENT
Start: 2019-07-14 | End: 2019-07-19

## 2019-07-13 RX ORDER — LEVOTHYROXINE SODIUM 0.07 MG/1
75 TABLET ORAL
Status: DISCONTINUED | OUTPATIENT
Start: 2019-07-14 | End: 2019-07-19

## 2019-07-13 RX ORDER — LORAZEPAM 2 MG/ML
3 INJECTION INTRAMUSCULAR
Status: DISCONTINUED | OUTPATIENT
Start: 2019-07-13 | End: 2019-07-18

## 2019-07-13 RX ORDER — SULFAMETHOXAZOLE AND TRIMETHOPRIM 800; 160 MG/1; MG/1
1 TABLET ORAL EVERY 12 HOURS
Status: COMPLETED | OUTPATIENT
Start: 2019-07-14 | End: 2019-07-14

## 2019-07-13 RX ORDER — LORAZEPAM 2 MG/ML
1 INJECTION INTRAMUSCULAR
Status: DISCONTINUED | OUTPATIENT
Start: 2019-07-13 | End: 2019-07-20

## 2019-07-13 RX ORDER — LEVETIRACETAM 100 MG/ML
500 SOLUTION ORAL EVERY 12 HOURS
Status: COMPLETED | OUTPATIENT
Start: 2019-07-13 | End: 2019-07-17

## 2019-07-13 RX ORDER — LORAZEPAM 2 MG/ML
4 INJECTION INTRAMUSCULAR
Status: DISCONTINUED | OUTPATIENT
Start: 2019-07-13 | End: 2019-07-18

## 2019-07-13 RX ORDER — LEVETIRACETAM 500 MG/1
500 TABLET ORAL 2 TIMES DAILY
Status: DISCONTINUED | OUTPATIENT
Start: 2019-07-13 | End: 2019-07-13

## 2019-07-13 RX ORDER — DOCUSATE SODIUM 50 MG/5ML
100 LIQUID ORAL 2 TIMES DAILY
Status: DISCONTINUED | OUTPATIENT
Start: 2019-07-13 | End: 2019-07-19

## 2019-07-13 RX ADMIN — LEVETIRACETAM 500 MG: 100 SOLUTION ORAL at 20:37

## 2019-07-13 RX ADMIN — PHENOBARBITAL SODIUM 260 MG: 130 INJECTION INTRAMUSCULAR; INTRAVENOUS at 06:24

## 2019-07-13 RX ADMIN — FOLIC ACID 1 MG: 1 TABLET ORAL at 18:31

## 2019-07-13 RX ADMIN — PHENOBARBITAL SODIUM 260 MG: 130 INJECTION INTRAMUSCULAR; INTRAVENOUS at 11:22

## 2019-07-13 RX ADMIN — PHENOBARBITAL SODIUM 130 MG: 130 INJECTION INTRAMUSCULAR; INTRAVENOUS at 04:45

## 2019-07-13 RX ADMIN — Medication 100 MG: at 04:44

## 2019-07-13 RX ADMIN — THERA TABS 1 TABLET: TAB at 18:31

## 2019-07-13 RX ADMIN — Medication 100 MG: at 18:31

## 2019-07-13 RX ADMIN — FOLIC ACID 1 MG: 1 TABLET ORAL at 04:44

## 2019-07-13 RX ADMIN — PHENOBARBITAL SODIUM 260 MG: 130 INJECTION INTRAMUSCULAR; INTRAVENOUS at 14:37

## 2019-07-13 RX ADMIN — LABETALOL HYDROCHLORIDE 10 MG: 5 INJECTION, SOLUTION INTRAVENOUS at 07:30

## 2019-07-13 RX ADMIN — PHENOBARBITAL SODIUM 260 MG: 130 INJECTION INTRAMUSCULAR; INTRAVENOUS at 13:26

## 2019-07-13 RX ADMIN — CHLORDIAZEPOXIDE HYDROCHLORIDE 25 MG: 25 CAPSULE ORAL at 04:44

## 2019-07-13 RX ADMIN — DOCUSATE SODIUM 100 MG: 50 LIQUID ORAL at 20:00

## 2019-07-13 RX ADMIN — SODIUM CHLORIDE TAB 1 GM 1 G: 1 TAB at 06:30

## 2019-07-13 RX ADMIN — PHENOBARBITAL SODIUM 130 MG: 130 INJECTION INTRAMUSCULAR; INTRAVENOUS at 09:45

## 2019-07-13 RX ADMIN — DOCUSATE SODIUM 100 MG: 100 CAPSULE, LIQUID FILLED ORAL at 04:44

## 2019-07-13 RX ADMIN — SULFAMETHOXAZOLE AND TRIMETHOPRIM 1 TABLET: 800; 160 TABLET ORAL at 18:40

## 2019-07-13 RX ADMIN — SODIUM CHLORIDE 500 MG: 9 INJECTION, SOLUTION INTRAVENOUS at 05:07

## 2019-07-13 RX ADMIN — LEVETIRACETAM 500 MG: 500 TABLET ORAL at 18:31

## 2019-07-13 RX ADMIN — LEVOTHYROXINE SODIUM 75 MCG: 75 TABLET ORAL at 04:45

## 2019-07-13 RX ADMIN — CHLORDIAZEPOXIDE HYDROCHLORIDE 25 MG: 25 CAPSULE ORAL at 00:50

## 2019-07-13 RX ADMIN — LORAZEPAM 2 MG: 2 INJECTION INTRAMUSCULAR; INTRAVENOUS at 14:57

## 2019-07-13 RX ADMIN — SODIUM CHLORIDE TAB 1 GM 1 G: 1 TAB at 18:31

## 2019-07-13 RX ADMIN — LIDOCAINE 1 PATCH: 50 PATCH TOPICAL at 23:10

## 2019-07-13 RX ADMIN — LORAZEPAM 2 MG: 2 INJECTION INTRAMUSCULAR; INTRAVENOUS at 15:19

## 2019-07-13 RX ADMIN — THERA TABS 1 TABLET: TAB at 04:44

## 2019-07-13 RX ADMIN — LISINOPRIL 40 MG: 20 TABLET ORAL at 04:44

## 2019-07-13 RX ADMIN — LABETALOL HYDROCHLORIDE 10 MG: 5 INJECTION, SOLUTION INTRAVENOUS at 15:03

## 2019-07-13 RX ADMIN — SULFAMETHOXAZOLE AND TRIMETHOPRIM 1 TABLET: 800; 160 TABLET ORAL at 04:45

## 2019-07-13 RX ADMIN — SENNOSIDES, DOCUSATE SODIUM 1 TABLET: 50; 8.6 TABLET, FILM COATED ORAL at 20:00

## 2019-07-13 RX ADMIN — PHENOBARBITAL SODIUM 260 MG: 130 INJECTION INTRAMUSCULAR; INTRAVENOUS at 12:20

## 2019-07-13 ASSESSMENT — ENCOUNTER SYMPTOMS
ARTHRALGIAS: 1
CONFUSION: 1
NERVOUS/ANXIOUS: 1
AGITATION: 1

## 2019-07-13 NOTE — PROGRESS NOTES
1804- Arrived to S-104 on monitor. Patient settled. Denies pain. Patient has visible tremors and is actively experiencing delusions. RASS +1, medicated per the MAR.     Belongings present include: shirt, undies x2, pants, contacts and contact solution, phone, wallet (locked in medication drawer), 2 books, shoes, phone .     2 RN skin assessment completed:   Sacrum red but blanching- Mepilex on sacrum   Panus has red ermias from underwear leg band as patient attempted to get into only one leg of her underwear and pulled it up over her waist- Underwear removed. RN to closely monitor.   Heels red but blanching- floating on pillows  Elbows red but blanching- floated on pillows   Redness on bridge of nose from glasses, blanches

## 2019-07-13 NOTE — DISCHARGE PLANNING
Received Choice form at 0800  Agency/Facility Name: [facility]   Referral sent per Choice form @ 0820

## 2019-07-13 NOTE — PROGRESS NOTES
Trauma Progress Note    Call received from RN  Pt having increased agitation  Required several doses of ativan, CIWA score 21, next reassessment in 10 minutes  - Continue CIWA protocol    Call received from Rapid Response RN about 30 minutes later  Pt having continued agitation and frequent ativan usage  - Transfer to SICU (only)  -  ICU RN to stay at bedside  - Initiate phenobarbital protocol  - Attending Dr. Cano and ICU MD Dr. Roth updated
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
## 2019-07-13 NOTE — PROGRESS NOTES
2 RN skin check complete with ARNAUD Santamaria.  Devices in place:   *Cardaic leads   *Pulse ox   *SCDs  Skin assessed under the devices listed above    Following areas of concern:    *Posterior head intact and blanching   *ears are intact and blanching   *scattered bruising on upper extremities   *Scattered bruising on lower extremities and scabs on knees.   *Sacral region is intact and blanching   *Heels are intact and blanching    The following interventions in place:   *Patient participating in Q 2 hour repositioning    *Patient participating in mobility   *low loss air mattress   *Pillows in use to float elbows and heels     Wound consult placedYES/NO: N\A    Wound reported YES/NO: N\A  Appropriate LDAs opened YES/NO: N\A

## 2019-07-13 NOTE — CARE PLAN
Problem: Safety  Goal: Will remain free from injury  Outcome: PROGRESSING AS EXPECTED  Administer phenobarb per protocol  Goal: Will remain free from falls  Outcome: PROGRESSING AS EXPECTED  Ensure bed fall monitor is in use    Problem: Pain Management  Goal: Pain level will decrease to patient's comfort goal  Outcome: PROGRESSING AS EXPECTED

## 2019-07-13 NOTE — PROCEDURES
Vascular Access Team    Date of Insertion: 7/13/19  Arm Circumference: n/a  Line Length: 10  Line Size: 20  Vein Occupancy %: 45  Reason for Midline: access   Labs: WBC 6.9, , BUN 6, Cr 0.75, GFR >60, INR 1.04 on 7/9/19    Orders confirmed, vessel patency confirmed with ultrasound. Risks and benefits of procedure explained to patient and education regarding line associated bloodstream infections provided. Questions answered.     PowerGlide Midline placed in LUE per MD order with ultrasound guidance. 20g, 10 cm line placed in basilic vein after 1 attempt(s).  Catheter inserted with good blood return. Secured with 0cm external from insertion site.  Flushed without resistance with 10 mL 0.9% normal saline. Midline secured with Biopatch and Tegaderm.     Midline placement is confirmed by nurse using ultrasound and ability to flush and draw blood. Midline is appropriate for use at this time.  No X-ray is needed for placement confirmation. Pt tolerated procedure well.  Patient condition relayed to unit RN or ordering physician via this post procedure note in the EMR.    Ultrasound images uploaded to PACS and viewable in the EMR - yes  Ultrasound imaged printed and placed in paper chart - no      BARD PowerGlide Midline ref # [id number], Lot # [id number]

## 2019-07-13 NOTE — DISCHARGE PLANNING
Agency/Facility Name: [facility]  Outcome: Received fax notification, patient accepted.

## 2019-07-13 NOTE — PROGRESS NOTES
Trauma / Surgical Daily Progress Note    Date of Service  7/13/2019    Chief Complaint  68 y.o. female admitted 7/9/2019 with ICH (intracerebral hemorrhage) (HCC)    Interval Events  Returned ICU for acute alcohol withdrawal  Phenobarbital protocol  Respiratory status acceptable  Ross catheter for agitation and urinary frequency and incontinence  Constant nursing observation  Mild stable hyponatremia  Intubation watch    Review of Systems  Review of Systems   Genitourinary: Positive for difficulty urinating and frequency.   Musculoskeletal: Positive for arthralgias.   Psychiatric/Behavioral: Positive for agitation and confusion. The patient is nervous/anxious.    All other systems reviewed and are negative.       Vital Signs for last 24 hours  Temp:  [36.7 °C (98 °F)-37.4 °C (99.3 °F)] 36.7 °C (98 °F)  Pulse:  [] 78  Resp:  [17-29] 24  BP: (134-179)/() 179/85  SpO2:  [89 %-100 %] 96 %    Hemodynamic parameters for last 24 hours       Respiratory Data     Respiration: (!) 24, Pulse Oximetry: 96 %        RUL Breath Sounds: Clear, RML Breath Sounds: Clear, RLL Breath Sounds: Diminished, CAIN Breath Sounds: Clear, LLL Breath Sounds: Diminished    Physical Exam  Physical Exam   Constitutional: She is oriented to person, place, and time. She appears well-developed and well-nourished. No distress. Nasal cannula in place.   HENT:   Head: Normocephalic and atraumatic.   Eyes: Pupils are equal, round, and reactive to light. Conjunctivae and EOM are normal.   Neck: Normal range of motion. Neck supple. JVD present. No tracheal deviation present. No thyromegaly present.   Cardiovascular: Normal rate, regular rhythm and intact distal pulses.    Pulmonary/Chest: Effort normal. No respiratory distress. She exhibits no tenderness.   Abdominal: Soft. She exhibits no distension. There is no tenderness. There is no guarding.   Musculoskeletal: She exhibits tenderness.   Ambulatory   Lymphadenopathy:     She has no cervical  adenopathy.   Neurological: She is alert and oriented to person, place, and time. GCS eye subscore is 4. GCS verbal subscore is 5. GCS motor subscore is 6.   Skin: Skin is warm and dry.   Psychiatric:   Agitated delirium    Nursing note and vitals reviewed.      Laboratory  Recent Results (from the past 24 hour(s))   CBC WITH DIFFERENTIAL    Collection Time: 07/13/19  6:15 AM   Result Value Ref Range    WBC 6.9 4.8 - 10.8 K/uL    RBC 3.57 (L) 4.20 - 5.40 M/uL    Hemoglobin 13.4 12.0 - 16.0 g/dL    Hematocrit 38.4 37.0 - 47.0 %    .6 (H) 81.4 - 97.8 fL    MCH 37.5 (H) 27.0 - 33.0 pg    MCHC 34.9 33.6 - 35.0 g/dL    RDW 46.5 35.9 - 50.0 fL    Platelet Count 151 (L) 164 - 446 K/uL    MPV 9.8 9.0 - 12.9 fL    Neutrophils-Polys 65.80 44.00 - 72.00 %    Lymphocytes 17.20 (L) 22.00 - 41.00 %    Monocytes 12.30 0.00 - 13.40 %    Eosinophils 4.20 0.00 - 6.90 %    Basophils 0.40 0.00 - 1.80 %    Immature Granulocytes 0.10 0.00 - 0.90 %    Nucleated RBC 0.00 /100 WBC    Neutrophils (Absolute) 4.53 2.00 - 7.15 K/uL    Lymphs (Absolute) 1.19 1.00 - 4.80 K/uL    Monos (Absolute) 0.85 0.00 - 0.85 K/uL    Eos (Absolute) 0.29 0.00 - 0.51 K/uL    Baso (Absolute) 0.03 0.00 - 0.12 K/uL    Immature Granulocytes (abs) 0.01 0.00 - 0.11 K/uL    NRBC (Absolute) 0.00 K/uL   Basic Metabolic Panel    Collection Time: 07/13/19  6:15 AM   Result Value Ref Range    Sodium 134 (L) 135 - 145 mmol/L    Potassium 3.6 3.6 - 5.5 mmol/L    Chloride 103 96 - 112 mmol/L    Co2 21 20 - 33 mmol/L    Glucose 103 (H) 65 - 99 mg/dL    Bun 6 (L) 8 - 22 mg/dL    Creatinine 0.75 0.50 - 1.40 mg/dL    Calcium 9.2 8.5 - 10.5 mg/dL    Anion Gap 10.0 0.0 - 11.9   MAGNESIUM    Collection Time: 07/13/19  6:15 AM   Result Value Ref Range    Magnesium 1.8 1.5 - 2.5 mg/dL   PHOSPHORUS    Collection Time: 07/13/19  6:15 AM   Result Value Ref Range    Phosphorus 3.9 2.5 - 4.5 mg/dL   ESTIMATED GFR    Collection Time: 07/13/19  6:15 AM   Result Value Ref Range    GFR  If African American >60 >60 mL/min/1.73 m 2    GFR If Non African American >60 >60 mL/min/1.73 m 2       Fluids    Intake/Output Summary (Last 24 hours) at 07/13/19 1044  Last data filed at 07/13/19 0800   Gross per 24 hour   Intake             1040 ml   Output             1195 ml   Net             -155 ml       Core Measures & Quality Metrics  Labs reviewed, Medications reviewed and Radiology images reviewed  Ross catheter: No Ross      DVT Prophylaxis: Contraindicated - High bleeding risk  DVT prophylaxis - mechanical: SCDs  Ulcer prophylaxis: Not indicated    Assessed for rehab: Patient returned to prior level of function, rehabilitation not indicated at this time    ADDY Score  ETOH Screening    Assessment/Plan  Alcohol abuse- (present on admission)   Assessment & Plan    Admission blood alcohol level of 0.17. BA at [facility] 0.25.  7/11 Brief intervention completed.   - Withdrawal symptoms noted overnight. CIWA, rally bag and multivitamins and Librium initiated.  7/12 Brief intervention completed again. Pt more forthcoming this assessment and family at bedside.  7/12 acute withdrawal to ICU with phenobarb prootcol     Contraindication to deep vein thrombosis (DVT) prophylaxis- (present on admission)   Assessment & Plan    Initial systemic anticoagulation contraindicated secondary to elevated bleeding risk.  RAP score 3.  7/10 Surveillance venous duplex scanning ordered.  7/10 Trauma screening bilateral lower extremity venous duplex negative for above knee DVT.  Ambulate TID.     Traumatic hemorrhage of right cerebrum without loss of consciousness (HCC)- (present on admission)   Assessment & Plan    Traumatic hemorrhage of right cerebrum.  Repeat head CT completed.  Additional head CT stable.  Non-operative management.   Post traumatic pharmacologic seizure prophylaxis for 1 week.  Speech Language Pathology cognitive evaluation.  Kashmir Parra MD. Neurosurgery (sign off 7/12).     UTI (urinary tract  infection)- (present on admission)   Assessment & Plan    Admission UA positive.  Antibiotics initiated.     Left shoulder pain- (present on admission)   Assessment & Plan    History of chronic left shoulder pain with decreased ROM.  Persistent pain.  7/11 Dedicated films negative for acute injury.     Hypertension- (present on admission)   Assessment & Plan    Chronic condition treated with Lisinopril.  Resume maintenance medication.     Hypothyroidism- (present on admission)   Assessment & Plan    Chronic condition treated with Levothyroxine.  Resume maintenance medication.     Trauma- (present on admission)   Assessment & Plan    Ground level fall. Intoxicated fell in bathroom. Also has a UTI.  T-5000 Activation. Transferred from [facility].  Hazel Cano MD. Trauma Surgery.         Discussed patient condition with RN, RT and Patient.  CRITICAL CARE TIME EXCLUDING PROCEDURES: 35    Minutes  I independently reviewed pertinent clinical lab tests from the last 48 hours and ordered additional follow up clinical lab tests.  I independently reviewed pertinent radiographic images and the radiologist's reports from the last 48 hours and ordered additional follow up radiographic studies.  I reviewed the details of the available patient records and documentation by consulting physicians in EPIC up to today, summated the information, and utilized the information as warranted in today's medical decision making for this patient.  I personally evaluated the patient condition at bedside and discussed the daily plan(s) with available nursing staff, dieticians, social workers, pharmacists on rounds.  I am actively managing this patient based on my personal bedside evaluation of this patient's radiographic, and laboratory findings and clinical changes throughout the day.  Phenobarbital to call and intubation watch

## 2019-07-14 ENCOUNTER — APPOINTMENT (OUTPATIENT)
Dept: RADIOLOGY | Facility: MEDICAL CENTER | Age: 69
DRG: 086 | End: 2019-07-14
Attending: INTERNAL MEDICINE
Payer: MEDICARE

## 2019-07-14 LAB
ACTION RANGE TRIGGERED IACRT: NO
AMMONIA PLAS-SCNC: 34 UMOL/L (ref 11–45)
ANION GAP SERPL CALC-SCNC: 10 MMOL/L (ref 0–11.9)
ANION GAP SERPL CALC-SCNC: 9 MMOL/L (ref 0–11.9)
BASE EXCESS BLDA CALC-SCNC: -3 MMOL/L (ref -4–3)
BASOPHILS # BLD AUTO: 0.4 % (ref 0–1.8)
BASOPHILS # BLD: 0.04 K/UL (ref 0–0.12)
BODY TEMPERATURE: ABNORMAL DEGREES
BUN SERPL-MCNC: 8 MG/DL (ref 8–22)
BUN SERPL-MCNC: 8 MG/DL (ref 8–22)
CALCIUM SERPL-MCNC: 7.9 MG/DL (ref 8.5–10.5)
CALCIUM SERPL-MCNC: 8.5 MG/DL (ref 8.5–10.5)
CHLORIDE SERPL-SCNC: 101 MMOL/L (ref 96–112)
CHLORIDE SERPL-SCNC: 103 MMOL/L (ref 96–112)
CO2 BLDA-SCNC: 21 MMOL/L (ref 20–33)
CO2 SERPL-SCNC: 18 MMOL/L (ref 20–33)
CO2 SERPL-SCNC: 19 MMOL/L (ref 20–33)
CREAT SERPL-MCNC: 0.57 MG/DL (ref 0.5–1.4)
CREAT SERPL-MCNC: 0.7 MG/DL (ref 0.5–1.4)
CRP SERPL HS-MCNC: 4.29 MG/DL (ref 0–0.75)
EOSINOPHIL # BLD AUTO: 0.22 K/UL (ref 0–0.51)
EOSINOPHIL NFR BLD: 2.2 % (ref 0–6.9)
ERYTHROCYTE [DISTWIDTH] IN BLOOD BY AUTOMATED COUNT: 48.3 FL (ref 35.9–50)
GLUCOSE BLD-MCNC: 119 MG/DL (ref 65–99)
GLUCOSE SERPL-MCNC: 142 MG/DL (ref 65–99)
GLUCOSE SERPL-MCNC: 149 MG/DL (ref 65–99)
HCO3 BLDA-SCNC: 20.4 MMOL/L (ref 17–25)
HCT VFR BLD AUTO: 40.7 % (ref 37–47)
HGB BLD-MCNC: 13.9 G/DL (ref 12–16)
HOROWITZ INDEX BLDA+IHG-RTO: 354 MM[HG]
IMM GRANULOCYTES # BLD AUTO: 0.03 K/UL (ref 0–0.11)
IMM GRANULOCYTES NFR BLD AUTO: 0.3 % (ref 0–0.9)
INST. QUALIFIED PATIENT IIQPT: YES
LYMPHOCYTES # BLD AUTO: 0.85 K/UL (ref 1–4.8)
LYMPHOCYTES NFR BLD: 8.4 % (ref 22–41)
MAGNESIUM SERPL-MCNC: 1.6 MG/DL (ref 1.5–2.5)
MAGNESIUM SERPL-MCNC: 1.7 MG/DL (ref 1.5–2.5)
MCH RBC QN AUTO: 37.4 PG (ref 27–33)
MCHC RBC AUTO-ENTMCNC: 34.2 G/DL (ref 33.6–35)
MCV RBC AUTO: 109.4 FL (ref 81.4–97.8)
MONOCYTES # BLD AUTO: 0.86 K/UL (ref 0–0.85)
MONOCYTES NFR BLD AUTO: 8.5 % (ref 0–13.4)
NEUTROPHILS # BLD AUTO: 8.13 K/UL (ref 2–7.15)
NEUTROPHILS NFR BLD: 80.2 % (ref 44–72)
NRBC # BLD AUTO: 0 K/UL
NRBC BLD-RTO: 0 /100 WBC
O2/TOTAL GAS SETTING VFR VENT: 28 %
PCO2 BLDA: 32.8 MMHG (ref 26–37)
PCO2 TEMP ADJ BLDA: 31.8 MMHG (ref 26–37)
PH BLDA: 7.4 [PH] (ref 7.4–7.5)
PH TEMP ADJ BLDA: 7.41 [PH] (ref 7.4–7.5)
PHOSPHATE SERPL-MCNC: 3.7 MG/DL (ref 2.5–4.5)
PHOSPHATE SERPL-MCNC: 4.3 MG/DL (ref 2.5–4.5)
PLATELET # BLD AUTO: 179 K/UL (ref 164–446)
PMV BLD AUTO: 9.9 FL (ref 9–12.9)
PO2 BLDA: 99 MMHG (ref 64–87)
PO2 TEMP ADJ BLDA: 94 MMHG (ref 64–87)
POTASSIUM SERPL-SCNC: 3.5 MMOL/L (ref 3.6–5.5)
POTASSIUM SERPL-SCNC: 3.8 MMOL/L (ref 3.6–5.5)
PREALB SERPL-MCNC: 9 MG/DL (ref 18–38)
RBC # BLD AUTO: 3.72 M/UL (ref 4.2–5.4)
SAO2 % BLDA: 98 % (ref 93–99)
SODIUM SERPL-SCNC: 130 MMOL/L (ref 135–145)
SODIUM SERPL-SCNC: 130 MMOL/L (ref 135–145)
SPECIMEN DRAWN FROM PATIENT: ABNORMAL
WBC # BLD AUTO: 10.1 K/UL (ref 4.8–10.8)

## 2019-07-14 PROCEDURE — 85025 COMPLETE CBC W/AUTO DIFF WBC: CPT

## 2019-07-14 PROCEDURE — 700105 HCHG RX REV CODE 258: Performed by: SURGERY

## 2019-07-14 PROCEDURE — 700101 HCHG RX REV CODE 250: Performed by: SURGERY

## 2019-07-14 PROCEDURE — 83735 ASSAY OF MAGNESIUM: CPT

## 2019-07-14 PROCEDURE — 84134 ASSAY OF PREALBUMIN: CPT

## 2019-07-14 PROCEDURE — 82803 BLOOD GASES ANY COMBINATION: CPT

## 2019-07-14 PROCEDURE — 82140 ASSAY OF AMMONIA: CPT

## 2019-07-14 PROCEDURE — 80048 BASIC METABOLIC PNL TOTAL CA: CPT

## 2019-07-14 PROCEDURE — A9270 NON-COVERED ITEM OR SERVICE: HCPCS | Performed by: SURGERY

## 2019-07-14 PROCEDURE — 700102 HCHG RX REV CODE 250 W/ 637 OVERRIDE(OP): Performed by: SURGERY

## 2019-07-14 PROCEDURE — 700112 HCHG RX REV CODE 229: Performed by: SURGERY

## 2019-07-14 PROCEDURE — 700102 HCHG RX REV CODE 250 W/ 637 OVERRIDE(OP): Performed by: NURSE PRACTITIONER

## 2019-07-14 PROCEDURE — 770022 HCHG ROOM/CARE - ICU (200)

## 2019-07-14 PROCEDURE — 99233 SBSQ HOSP IP/OBS HIGH 50: CPT | Performed by: SURGERY

## 2019-07-14 PROCEDURE — 70450 CT HEAD/BRAIN W/O DYE: CPT

## 2019-07-14 PROCEDURE — 86140 C-REACTIVE PROTEIN: CPT

## 2019-07-14 PROCEDURE — 84100 ASSAY OF PHOSPHORUS: CPT

## 2019-07-14 PROCEDURE — 700111 HCHG RX REV CODE 636 W/ 250 OVERRIDE (IP): Performed by: SURGERY

## 2019-07-14 PROCEDURE — 82962 GLUCOSE BLOOD TEST: CPT

## 2019-07-14 PROCEDURE — A9270 NON-COVERED ITEM OR SERVICE: HCPCS | Performed by: NURSE PRACTITIONER

## 2019-07-14 PROCEDURE — 99291 CRITICAL CARE FIRST HOUR: CPT | Performed by: INTERNAL MEDICINE

## 2019-07-14 PROCEDURE — 36600 WITHDRAWAL OF ARTERIAL BLOOD: CPT

## 2019-07-14 RX ORDER — PHENYLEPHRINE HYDROCHLORIDE 10 MG/ML
INJECTION, SOLUTION INTRAMUSCULAR; INTRAVENOUS; SUBCUTANEOUS
Status: ACTIVE
Start: 2019-07-14 | End: 2019-07-14

## 2019-07-14 RX ORDER — SODIUM CHLORIDE 9 MG/ML
INJECTION, SOLUTION INTRAVENOUS CONTINUOUS
Status: DISCONTINUED | OUTPATIENT
Start: 2019-07-14 | End: 2019-07-19

## 2019-07-14 RX ADMIN — LORAZEPAM 1 MG: 2 INJECTION INTRAMUSCULAR; INTRAVENOUS at 18:23

## 2019-07-14 RX ADMIN — LORAZEPAM 2 MG: 2 INJECTION INTRAMUSCULAR; INTRAVENOUS at 14:12

## 2019-07-14 RX ADMIN — SODIUM CHLORIDE TAB 1 GM 1 G: 1 TAB at 06:37

## 2019-07-14 RX ADMIN — LORAZEPAM 1 MG: 2 INJECTION INTRAMUSCULAR; INTRAVENOUS at 10:12

## 2019-07-14 RX ADMIN — LEVETIRACETAM 500 MG: 100 SOLUTION ORAL at 17:14

## 2019-07-14 RX ADMIN — LEVETIRACETAM 500 MG: 100 SOLUTION ORAL at 05:40

## 2019-07-14 RX ADMIN — DOCUSATE SODIUM 100 MG: 50 LIQUID ORAL at 17:15

## 2019-07-14 RX ADMIN — SULFAMETHOXAZOLE AND TRIMETHOPRIM 1 TABLET: 800; 160 TABLET ORAL at 17:14

## 2019-07-14 RX ADMIN — LEVOTHYROXINE SODIUM 75 MCG: 75 TABLET ORAL at 05:40

## 2019-07-14 RX ADMIN — LORAZEPAM 1 MG: 2 INJECTION INTRAMUSCULAR; INTRAVENOUS at 13:30

## 2019-07-14 RX ADMIN — SENNOSIDES, DOCUSATE SODIUM 1 TABLET: 50; 8.6 TABLET, FILM COATED ORAL at 21:52

## 2019-07-14 RX ADMIN — MAGNESIUM HYDROXIDE 30 ML: 400 SUSPENSION ORAL at 05:39

## 2019-07-14 RX ADMIN — POLYETHYLENE GLYCOL 3350 1 PACKET: 17 POWDER, FOR SOLUTION ORAL at 05:39

## 2019-07-14 RX ADMIN — ACETAMINOPHEN 650 MG: 325 TABLET, FILM COATED ORAL at 06:37

## 2019-07-14 RX ADMIN — POLYETHYLENE GLYCOL 3350 1 PACKET: 17 POWDER, FOR SOLUTION ORAL at 17:14

## 2019-07-14 RX ADMIN — SULFAMETHOXAZOLE AND TRIMETHOPRIM 1 TABLET: 800; 160 TABLET ORAL at 05:40

## 2019-07-14 RX ADMIN — DOCUSATE SODIUM 100 MG: 50 LIQUID ORAL at 05:39

## 2019-07-14 RX ADMIN — LORAZEPAM 2 MG: 2 INJECTION INTRAMUSCULAR; INTRAVENOUS at 17:18

## 2019-07-14 RX ADMIN — SODIUM CHLORIDE TAB 1 GM 1 G: 1 TAB at 17:14

## 2019-07-14 RX ADMIN — SODIUM CHLORIDE TAB 1 GM 1 G: 1 TAB at 10:30

## 2019-07-14 RX ADMIN — FOLIC ACID 1 MG: 1 TABLET ORAL at 05:39

## 2019-07-14 RX ADMIN — LIDOCAINE 1 PATCH: 50 PATCH TOPICAL at 23:08

## 2019-07-14 RX ADMIN — LORAZEPAM 2 MG: 2 INJECTION INTRAMUSCULAR; INTRAVENOUS at 02:45

## 2019-07-14 RX ADMIN — Medication 100 MG: at 05:39

## 2019-07-14 RX ADMIN — SODIUM CHLORIDE: 9 INJECTION, SOLUTION INTRAVENOUS at 10:07

## 2019-07-14 RX ADMIN — THERA TABS 1 TABLET: TAB at 05:39

## 2019-07-14 NOTE — DIETARY
"Nutrition Support/TF Assessment:  Day 5 of admit.  Jennifer Koroma is a 68 y.o. female with admitting DX of ICH     Current problem list:  1. Alcohol abuse  2. Contraindication to deep vein thrombosis (DVT) prophylaxis  3. Traumatic hemorrhage of right cerebrum without loss of consciousness   4. UTI   5. Left shoulder pain  6. Hypertension  7. Hypothyroidism  8. Trauma          Assessment:  Estimated Nutritional Needs based on:   Height: 167.6 cm (5' 5.98\") (copied from last entry)  Weight: 94.2 kg (207 lb 10.8 oz)- per bedscale on , expected dry wt and will use for calculations.   Ideal Body Weight: 59 kg (130 lb)  Percent Ideal Body Weight: 159.7  Body mass index is 33.54 kg/m²., BMI classification: Class I obesity      Calculation/Equation: MSJ X 1.0= 1495  Total Calories / day: 1537-3585 (Calories / k-18)  Total Grams Protein / day:  (Grams Protein / k.5-2.0 of IBW)  Total Free water/day: 9526-4575 ml/day (~ 25-30 ml/kg)     Evaluation:   1. TF consult received, pt in ICU for alcohol withdrawal.   2. Enteral access confirmed in stomach per KUB on .  3. Pertinent Labs: Na+ 130, am Glu 149.   4. Pertinent Meds: Dulcolax PRn, Colace twice daily, Fleet enema PRN, MOM PRN, Zofran PRN, Miralax twice daily, Senokot PRN, Sodium Chloride three times daily, 100 mg Thiamine, daily Multivitamin, Folic Acid daily.   5. Pt at risk for refeeding due to hx of ETOH, Phos, Mg, and K+ within normal limits.      Malnutrition Risk: n/a     Recommendations/Plan:  1. Begin Impact Peptide 1.5 @ 25 ml/hr and advance per TF protocol to goal rate of 45 ml/hr to provide 1620 kcals/day, 102 g pro/day, and 832 ml free water/day.  2. Monitor for refeeding: Order BMP w/ Mg and Phos x 7 days. Replete K, Phos and Mg prn. Continue to supplement 100 mg Thiamine x 7 days to reduce risk of refeeding  3. Fluids per MD.   4. RD following.                                 "

## 2019-07-14 NOTE — DOCUMENTATION QUERY
Critical access hospital                                                                       Query Response Note      PATIENT:               ZELALEM CASAS  ACCT #:                  [id number]  MRN:                     [id number]  :                      1950  ADMIT DATE:       2019 6:41 PM  DISCH DATE:          RESPONDING  PROVIDER #:        [id number]           QUERY TEXT:    Alcohol withdrawal and a history of alcohol abuse are documented in the Medical Record. Per coding guidelines, alcohol withdrawal is categorized with alcohol dependence. Only alcohol abuse has been documented. Can you please clarify the pattern of this patient's alcohol use?     NOTE:  If an appropriate response is not listed below, please respond with a new note.    The patient's Clinical Indicators include:  Per Progress Notes  -[place]  -Had multiple rehab stays  -Admission blood alcohol level of 0.17  -BA at [facility] 0.25  -Withdrawal symptoms noted overnight    Treatment:   Transfer to ICU, CIWIA phenobarb protocol, Ativan, Librium, Rally Bag, thiamine, MVI, & folic acid     Risk Factors:   Current withdrawal symptoms & hx alcohol abuse & multiple rehab stays  Options provided:   -- Alcohol dependence with withdrawal   -- Alcohol abuse only   -- Unable to determine      Query created by: Lauren Landers on 2019 11:04 AM    RESPONSE TEXT:    Alcohol dependence with withdrawal          Electronically signed by:  SHANITA BUCK 2019 1:40 PM

## 2019-07-14 NOTE — PROGRESS NOTES
Trauma / Surgical Daily Progress Note    Date of Service  7/14/2019    Chief Complaint  68 y.o. female admitted 7/9/2019 with ICH (intracerebral hemorrhage) (HCC)    Interval Events  Fulminant DT's  coretrak and tube feeds   Supplement IVF  Intermittent somnolence   repeat head CT neg for change   Hyponatremia  Add NS   Continue ICU    Review of Systems  Review of Systems   Unable to perform ROS: Mental status change        Vital Signs for last 24 hours  Pulse:  [] 95  Resp:  [17-42] 27  SpO2:  [93 %-100 %] 93 %    Hemodynamic parameters for last 24 hours       Respiratory Data     Respiration: (!) 27, Pulse Oximetry: 93 %        RUL Breath Sounds: Clear, RML Breath Sounds: Clear, RLL Breath Sounds: Diminished, CAIN Breath Sounds: Clear, LLL Breath Sounds: Diminished    Physical Exam  Physical Exam   Constitutional: She appears well-developed and well-nourished. No distress. Nasal cannula in place.   HENT:   Head: Normocephalic and atraumatic.   Eyes: Pupils are equal, round, and reactive to light. Conjunctivae and EOM are normal.   Neck: Normal range of motion. Neck supple. JVD present. No tracheal deviation present. No thyromegaly present.   Cardiovascular: Normal rate, regular rhythm and intact distal pulses.    Pulmonary/Chest: Effort normal. No respiratory distress. She exhibits no tenderness.   Abdominal: Soft. She exhibits no distension. There is no tenderness. There is no guarding.   Musculoskeletal: She exhibits tenderness.   Ambulatory   Lymphadenopathy:     She has no cervical adenopathy.   Neurological: She is alert. No cranial nerve deficit. GCS eye subscore is 4. GCS verbal subscore is 5. GCS motor subscore is 6.   Skin: Skin is warm and dry.   Psychiatric:   Agitated delirium    Nursing note and vitals reviewed.      Laboratory  Recent Results (from the past 24 hour(s))   ACCU-CHEK GLUCOSE    Collection Time: 07/14/19  1:21 AM   Result Value Ref Range    Glucose - Accu-Ck 119 (H) 65 - 99 mg/dL    Magnesium    Collection Time: 07/14/19  1:22 AM   Result Value Ref Range    Magnesium 1.6 1.5 - 2.5 mg/dL   CRP Quantitive (Non-Cardiac)    Collection Time: 07/14/19  1:22 AM   Result Value Ref Range    Stat C-Reactive Protein 4.29 (H) 0.00 - 0.75 mg/dL   Prealbumin    Collection Time: 07/14/19  1:22 AM   Result Value Ref Range    Pre-Albumin 9.0 (L) 18.0 - 38.0 mg/dL   AMMONIA    Collection Time: 07/14/19  1:22 AM   Result Value Ref Range    Ammonia 34 11 - 45 umol/L   Basic Metabolic Panel    Collection Time: 07/14/19  1:22 AM   Result Value Ref Range    Sodium 130 (L) 135 - 145 mmol/L    Potassium 3.5 (L) 3.6 - 5.5 mmol/L    Chloride 103 96 - 112 mmol/L    Co2 18 (L) 20 - 33 mmol/L    Glucose 142 (H) 65 - 99 mg/dL    Bun 8 8 - 22 mg/dL    Creatinine 0.57 0.50 - 1.40 mg/dL    Calcium 7.9 (L) 8.5 - 10.5 mg/dL    Anion Gap 9.0 0.0 - 11.9   PHOSPHORUS    Collection Time: 07/14/19  1:22 AM   Result Value Ref Range    Phosphorus 4.3 2.5 - 4.5 mg/dL   ESTIMATED GFR    Collection Time: 07/14/19  1:22 AM   Result Value Ref Range    GFR If African American >60 >60 mL/min/1.73 m 2    GFR If Non African American >60 >60 mL/min/1.73 m 2   ISTAT ARTERIAL BLOOD GAS    Collection Time: 07/14/19  1:24 AM   Result Value Ref Range    Ph 7.403 7.400 - 7.500    Pco2 32.8 26.0 - 37.0 mmHg    Po2 99 (H) 64 - 87 mmHg    Tco2 21 20 - 33 mmol/L    S02 98 93 - 99 %    Hco3 20.4 17.0 - 25.0 mmol/L    BE -3 -4 - 3 mmol/L    Body Temp 97.3 F degrees    O2 Therapy 28 %    iPF Ratio 354     Ph Temp Barbara 7.413 7.400 - 7.500    Pco2 Temp Co 31.8 26.0 - 37.0 mmHg    Po2 Temp Cor 94 (H) 64 - 87 mmHg    Specimen Arterial     Action Range Triggered NO     Inst. Qualified Patient YES    CBC WITH DIFFERENTIAL    Collection Time: 07/14/19  4:17 AM   Result Value Ref Range    WBC 10.1 4.8 - 10.8 K/uL    RBC 3.72 (L) 4.20 - 5.40 M/uL    Hemoglobin 13.9 12.0 - 16.0 g/dL    Hematocrit 40.7 37.0 - 47.0 %    .4 (H) 81.4 - 97.8 fL    MCH 37.4 (H) 27.0  - 33.0 pg    MCHC 34.2 33.6 - 35.0 g/dL    RDW 48.3 35.9 - 50.0 fL    Platelet Count 179 164 - 446 K/uL    MPV 9.9 9.0 - 12.9 fL    Neutrophils-Polys 80.20 (H) 44.00 - 72.00 %    Lymphocytes 8.40 (L) 22.00 - 41.00 %    Monocytes 8.50 0.00 - 13.40 %    Eosinophils 2.20 0.00 - 6.90 %    Basophils 0.40 0.00 - 1.80 %    Immature Granulocytes 0.30 0.00 - 0.90 %    Nucleated RBC 0.00 /100 WBC    Neutrophils (Absolute) 8.13 (H) 2.00 - 7.15 K/uL    Lymphs (Absolute) 0.85 (L) 1.00 - 4.80 K/uL    Monos (Absolute) 0.86 (H) 0.00 - 0.85 K/uL    Eos (Absolute) 0.22 0.00 - 0.51 K/uL    Baso (Absolute) 0.04 0.00 - 0.12 K/uL    Immature Granulocytes (abs) 0.03 0.00 - 0.11 K/uL    NRBC (Absolute) 0.00 K/uL   Basic Metabolic Panel    Collection Time: 07/14/19  4:17 AM   Result Value Ref Range    Sodium 130 (L) 135 - 145 mmol/L    Potassium 3.8 3.6 - 5.5 mmol/L    Chloride 101 96 - 112 mmol/L    Co2 19 (L) 20 - 33 mmol/L    Glucose 149 (H) 65 - 99 mg/dL    Bun 8 8 - 22 mg/dL    Creatinine 0.70 0.50 - 1.40 mg/dL    Calcium 8.5 8.5 - 10.5 mg/dL    Anion Gap 10.0 0.0 - 11.9   MAGNESIUM    Collection Time: 07/14/19  4:17 AM   Result Value Ref Range    Magnesium 1.7 1.5 - 2.5 mg/dL   PHOSPHORUS    Collection Time: 07/14/19  4:17 AM   Result Value Ref Range    Phosphorus 3.7 2.5 - 4.5 mg/dL   ESTIMATED GFR    Collection Time: 07/14/19  4:17 AM   Result Value Ref Range    GFR If African American >60 >60 mL/min/1.73 m 2    GFR If Non African American >60 >60 mL/min/1.73 m 2       Fluids    Intake/Output Summary (Last 24 hours) at 07/14/19 1225  Last data filed at 07/14/19 0800   Gross per 24 hour   Intake              350 ml   Output             1445 ml   Net            -1095 ml       Core Measures & Quality Metrics  Labs reviewed, Medications reviewed and Radiology images reviewed  Ross catheter: No Ross      DVT Prophylaxis: Contraindicated - High bleeding risk  DVT prophylaxis - mechanical: SCDs  Ulcer prophylaxis: Not  indicated    Assessed for rehab: Patient returned to prior level of function, rehabilitation not indicated at this time    ADDY Score  ETOH Screening    Assessment/Plan  Alcohol abuse- (present on admission)   Assessment & Plan    Admission blood alcohol level of 0.17. BA at [facility] 0.25.  7/11 Brief intervention completed.   - Withdrawal symptoms noted overnight. CIWA, rally bag and multivitamins and Librium initiated.  7/12 Brief intervention completed again. Pt more forthcoming this assessment and family at bedside.  7/12 acute withdrawal to ICU with phenobarb prootcol  7/13  Changed to RASS phenobarb ineffective   7/14 periods of unresponsiveness , carefull ativan dosing , repeat CT Head Ok     Contraindication to deep vein thrombosis (DVT) prophylaxis- (present on admission)   Assessment & Plan    Initial systemic anticoagulation contraindicated secondary to elevated bleeding risk.  RAP score 3.  7/10 Surveillance venous duplex scanning ordered.  7/10 Trauma screening bilateral lower extremity venous duplex negative for above knee DVT.  Ambulate TID.     Traumatic hemorrhage of right cerebrum without loss of consciousness (HCC)- (present on admission)   Assessment & Plan    Traumatic hemorrhage of right cerebrum.  Repeat head CT completed.  Additional head CT stable.  Non-operative management.   Post traumatic pharmacologic seizure prophylaxis for 1 week.  Speech Language Pathology cognitive evaluation.  Kashmir Parra MD. Neurosurgery (sign off 7/12).     UTI (urinary tract infection)- (present on admission)   Assessment & Plan    Admission UA positive.  Antibiotics initiated.     Left shoulder pain- (present on admission)   Assessment & Plan    History of chronic left shoulder pain with decreased ROM.  Persistent pain.  7/11 Dedicated films negative for acute injury.     Hypertension- (present on admission)   Assessment & Plan    Chronic condition treated with Lisinopril.  Resume maintenance medication.      Hypothyroidism- (present on admission)   Assessment & Plan    Chronic condition treated with Levothyroxine.  Resume maintenance medication.     Trauma- (present on admission)   Assessment & Plan    Ground level fall. Intoxicated fell in bathroom. Also has a UTI.  T-5000 Activation. Transferred from [facility].  Hazel Cano MD. Trauma Surgery.         Discussed patient condition with RN, RT and Patient.  CRITICAL CARE TIME EXCLUDING PROCEDURES: 38    Minutes  I independently reviewed pertinent clinical lab tests from the last 48 hours and ordered additional follow up clinical lab tests.  I independently reviewed pertinent radiographic images and the radiologist's reports from the last 48 hours and ordered additional follow up radiographic studies.  I reviewed the details of the available patient records and documentation by consulting physicians in EPIC up to today, summated the information, and utilized the information as warranted in today's medical decision making for this patient.  I personally evaluated the patient condition at bedside and discussed the daily plan(s) with available nursing staff, dieticians, social workers, pharmacists on rounds.  I am actively managing this patient  based on my personal bedside evaluation of this patient's radiographic, and laboratory findings and clinical changes throughout the day.  Intubation watch   Unstable with risk for deterioration

## 2019-07-14 NOTE — PROGRESS NOTES
2 RN skin check completed    Devices in place:              *Cardaic leads              *Pulse ox    *BP cuff              *SCDs  Skin assessed under the devices listed above     Following areas of concern:               *Posterior head intact and blanching              *ears are intact and blanching              *scattered bruising on upper extremities              *Scattered bruising on lower extremities and scabs on knees.              *Sacral region is intact and blanching. There is a red scab on the buttocks that is blanching.               *Heels are intact and blanching     The following interventions in place:              *Patient participating in Q 2 hour repositioning    *Preventative mepilex in place               *low loss air mattress              *Pillows in use to float elbows and heels      Wound consult placedYES/NO: N\A    Wound reported YES/NO: N\A  Appropriate LDAs opened YES/NO: N\A

## 2019-07-14 NOTE — CARE PLAN
Problem: Safety  Goal: Will remain free from falls    Intervention: Implement fall precautions  Fall precautions in place. Bed locked and in lowest position. Bed alarm on. Call light within reach.       Problem: Infection  Goal: Will remain free from infection    Intervention: Implement standard precautions and perform hand washing before and after patient contact  Universal precautions in place such as hand hygiene

## 2019-07-14 NOTE — PROGRESS NOTES
2 RN skin check complete with ARNAUD Cat.    Devices in place silicone nasal cannula, cortrak, ECG leads, midline, pulse ox, SCDs.    Skin assessed under the following devices: see above.    Preventative measures in place including pt turned q2h, pillows in use for offloading, silicone nasal cannula, 2 RN skin check qshift.  Following areas of concern:   · Scattered bruising on upper and lower extremities, scabs to knees, scab to R buttock.       Wound consult placedYES/NO: no    Wound reported YES/NO: no  Appropriate LDAs opened YES/NO: no

## 2019-07-14 NOTE — PROGRESS NOTES
68 y F with ICH and alcohol withdrawal switched from phenobarb to ativan today    Called by nurse patient is obtunded even with deep sternal rub  Will get stat ABG, glucose, ammonia, CT head  Mild hypotensive SBP 80  Map 65 will given neosynephrine 2ml now to see if with increase Bp helps with mental status change.   Glucose 119  Ammonia 34  With stimulation patient withdrawal in lowers and stimulation MAP improved to 83  will hold neosynephrine, pupils equal and brisk.  Monitor closely for need for intubation and airway protection GCS of 7 will maintain aspiration precautions with upright position.   ABG 7.40/32/99  Will monitor closely and get Stat CT     Likely stacking effect of phenobarb and ativan in a patient with ICH.   A more titratable option would be Dexmeditomidine gtt, and depakote + valentina.  Will d/c lisinopril 40mg re-evaluate need in am.     More awake AO moving extremities.   Stat Ct head reviewed stable hemorrhage.     Patient remains in critical condition from acute neurologic change and low GCS multiple re-evaluation and potential need for airway protection with intubation. Critical care time provided was 60 minutes. This excludes all separate billable procedures.

## 2019-07-15 ENCOUNTER — APPOINTMENT (OUTPATIENT)
Dept: RADIOLOGY | Facility: MEDICAL CENTER | Age: 69
DRG: 086 | End: 2019-07-15
Attending: SURGERY
Payer: MEDICARE

## 2019-07-15 PROBLEM — F13.931 BENZODIAZEPINE WITHDRAWAL WITH DELIRIUM (HCC): Status: ACTIVE | Noted: 2019-07-15

## 2019-07-15 LAB
ALBUMIN SERPL BCP-MCNC: 3.1 G/DL (ref 3.2–4.9)
ALBUMIN/GLOB SERPL: 1.1 G/DL
ALP SERPL-CCNC: 86 U/L (ref 30–99)
ALT SERPL-CCNC: 29 U/L (ref 2–50)
ANION GAP SERPL CALC-SCNC: 7 MMOL/L (ref 0–11.9)
AST SERPL-CCNC: 23 U/L (ref 12–45)
BASOPHILS # BLD AUTO: 0.5 % (ref 0–1.8)
BASOPHILS # BLD: 0.04 K/UL (ref 0–0.12)
BILIRUB SERPL-MCNC: 0.5 MG/DL (ref 0.1–1.5)
BUN SERPL-MCNC: 16 MG/DL (ref 8–22)
CALCIUM SERPL-MCNC: 8.3 MG/DL (ref 8.5–10.5)
CHLORIDE SERPL-SCNC: 104 MMOL/L (ref 96–112)
CO2 SERPL-SCNC: 21 MMOL/L (ref 20–33)
CREAT SERPL-MCNC: 0.69 MG/DL (ref 0.5–1.4)
EOSINOPHIL # BLD AUTO: 0.2 K/UL (ref 0–0.51)
EOSINOPHIL NFR BLD: 2.6 % (ref 0–6.9)
ERYTHROCYTE [DISTWIDTH] IN BLOOD BY AUTOMATED COUNT: 48.4 FL (ref 35.9–50)
GLOBULIN SER CALC-MCNC: 2.9 G/DL (ref 1.9–3.5)
GLUCOSE SERPL-MCNC: 127 MG/DL (ref 65–99)
HCT VFR BLD AUTO: 38.5 % (ref 37–47)
HGB BLD-MCNC: 12.9 G/DL (ref 12–16)
IMM GRANULOCYTES # BLD AUTO: 0.02 K/UL (ref 0–0.11)
IMM GRANULOCYTES NFR BLD AUTO: 0.3 % (ref 0–0.9)
LYMPHOCYTES # BLD AUTO: 1.22 K/UL (ref 1–4.8)
LYMPHOCYTES NFR BLD: 15.8 % (ref 22–41)
MAGNESIUM SERPL-MCNC: 1.7 MG/DL (ref 1.5–2.5)
MCH RBC QN AUTO: 37.1 PG (ref 27–33)
MCHC RBC AUTO-ENTMCNC: 33.5 G/DL (ref 33.6–35)
MCV RBC AUTO: 110.6 FL (ref 81.4–97.8)
MONOCYTES # BLD AUTO: 1.21 K/UL (ref 0–0.85)
MONOCYTES NFR BLD AUTO: 15.7 % (ref 0–13.4)
NEUTROPHILS # BLD AUTO: 5.02 K/UL (ref 2–7.15)
NEUTROPHILS NFR BLD: 65.1 % (ref 44–72)
NRBC # BLD AUTO: 0 K/UL
NRBC BLD-RTO: 0 /100 WBC
PLATELET # BLD AUTO: 205 K/UL (ref 164–446)
PMV BLD AUTO: 10.3 FL (ref 9–12.9)
POTASSIUM SERPL-SCNC: 3.9 MMOL/L (ref 3.6–5.5)
PROT SERPL-MCNC: 6 G/DL (ref 6–8.2)
RBC # BLD AUTO: 3.48 M/UL (ref 4.2–5.4)
SODIUM SERPL-SCNC: 132 MMOL/L (ref 135–145)
WBC # BLD AUTO: 7.7 K/UL (ref 4.8–10.8)

## 2019-07-15 PROCEDURE — 97166 OT EVAL MOD COMPLEX 45 MIN: CPT

## 2019-07-15 PROCEDURE — 700102 HCHG RX REV CODE 250 W/ 637 OVERRIDE(OP): Performed by: SURGERY

## 2019-07-15 PROCEDURE — 700112 HCHG RX REV CODE 229: Performed by: SURGERY

## 2019-07-15 PROCEDURE — 700111 HCHG RX REV CODE 636 W/ 250 OVERRIDE (IP): Performed by: SURGERY

## 2019-07-15 PROCEDURE — A9270 NON-COVERED ITEM OR SERVICE: HCPCS | Performed by: SURGERY

## 2019-07-15 PROCEDURE — 97162 PT EVAL MOD COMPLEX 30 MIN: CPT

## 2019-07-15 PROCEDURE — 770022 HCHG ROOM/CARE - ICU (200)

## 2019-07-15 PROCEDURE — 99233 SBSQ HOSP IP/OBS HIGH 50: CPT | Performed by: SURGERY

## 2019-07-15 PROCEDURE — 85025 COMPLETE CBC W/AUTO DIFF WBC: CPT

## 2019-07-15 PROCEDURE — 80053 COMPREHEN METABOLIC PANEL: CPT

## 2019-07-15 PROCEDURE — 83735 ASSAY OF MAGNESIUM: CPT

## 2019-07-15 RX ORDER — FOLIC ACID 1 MG/1
1 TABLET ORAL DAILY
Status: COMPLETED | OUTPATIENT
Start: 2019-07-16 | End: 2019-07-16

## 2019-07-15 RX ORDER — LORAZEPAM 1 MG/1
0.5 TABLET ORAL EVERY 8 HOURS
Status: DISCONTINUED | OUTPATIENT
Start: 2019-07-15 | End: 2019-07-16

## 2019-07-15 RX ORDER — AMOXICILLIN 250 MG
1 CAPSULE ORAL
Status: DISCONTINUED | OUTPATIENT
Start: 2019-07-15 | End: 2019-07-19

## 2019-07-15 RX ORDER — SODIUM CHLORIDE 1 G/1
2 TABLET ORAL
Status: DISCONTINUED | OUTPATIENT
Start: 2019-07-16 | End: 2019-07-19

## 2019-07-15 RX ORDER — ACETAMINOPHEN 325 MG/1
650 TABLET ORAL EVERY 6 HOURS PRN
Status: DISCONTINUED | OUTPATIENT
Start: 2019-07-15 | End: 2019-07-19

## 2019-07-15 RX ORDER — THIAMINE MONONITRATE (VIT B1) 100 MG
100 TABLET ORAL DAILY
Status: COMPLETED | OUTPATIENT
Start: 2019-07-16 | End: 2019-07-16

## 2019-07-15 RX ADMIN — LEVOTHYROXINE SODIUM 75 MCG: 75 TABLET ORAL at 05:00

## 2019-07-15 RX ADMIN — SODIUM CHLORIDE TAB 1 GM 1 G: 1 TAB at 08:59

## 2019-07-15 RX ADMIN — LEVETIRACETAM 500 MG: 100 SOLUTION ORAL at 05:00

## 2019-07-15 RX ADMIN — BISACODYL 10 MG: 10 SUPPOSITORY RECTAL at 04:37

## 2019-07-15 RX ADMIN — Medication 100 MG: at 05:00

## 2019-07-15 RX ADMIN — DOCUSATE SODIUM 100 MG: 50 LIQUID ORAL at 05:00

## 2019-07-15 RX ADMIN — LORAZEPAM 1 MG: 2 INJECTION INTRAMUSCULAR; INTRAVENOUS at 20:14

## 2019-07-15 RX ADMIN — THERA TABS 1 TABLET: TAB at 05:00

## 2019-07-15 RX ADMIN — SODIUM CHLORIDE TAB 1 GM 1 G: 1 TAB at 12:34

## 2019-07-15 RX ADMIN — FOLIC ACID 1 MG: 1 TABLET ORAL at 05:00

## 2019-07-15 RX ADMIN — LORAZEPAM 0.5 MG: 1 TABLET ORAL at 23:00

## 2019-07-15 RX ADMIN — LORAZEPAM 0.5 MG: 1 TABLET ORAL at 12:35

## 2019-07-15 RX ADMIN — POLYETHYLENE GLYCOL 3350 1 PACKET: 17 POWDER, FOR SOLUTION ORAL at 05:00

## 2019-07-15 RX ADMIN — LEVETIRACETAM 500 MG: 100 SOLUTION ORAL at 18:23

## 2019-07-15 RX ADMIN — MAGNESIUM HYDROXIDE 30 ML: 400 SUSPENSION ORAL at 05:00

## 2019-07-15 ASSESSMENT — COGNITIVE AND FUNCTIONAL STATUS - GENERAL
TURNING FROM BACK TO SIDE WHILE IN FLAT BAD: A LITTLE
SUGGESTED CMS G CODE MODIFIER MOBILITY: CL
CLIMB 3 TO 5 STEPS WITH RAILING: TOTAL
STANDING UP FROM CHAIR USING ARMS: A LOT
WALKING IN HOSPITAL ROOM: A LOT
DAILY ACTIVITIY SCORE: 12
DRESSING REGULAR UPPER BODY CLOTHING: A LITTLE
MOVING FROM LYING ON BACK TO SITTING ON SIDE OF FLAT BED: UNABLE
EATING MEALS: TOTAL
MOBILITY SCORE: 10
MOVING TO AND FROM BED TO CHAIR: UNABLE
TOILETING: A LOT
PERSONAL GROOMING: A LITTLE
SUGGESTED CMS G CODE MODIFIER DAILY ACTIVITY: CL
DRESSING REGULAR LOWER BODY CLOTHING: TOTAL
HELP NEEDED FOR BATHING: A LOT

## 2019-07-15 ASSESSMENT — ACTIVITIES OF DAILY LIVING (ADL): TOILETING: INDEPENDENT

## 2019-07-15 ASSESSMENT — GAIT ASSESSMENTS: GAIT LEVEL OF ASSIST: UNABLE TO PARTICIPATE

## 2019-07-15 NOTE — DISCHARGE PLANNING
Agency/Facility Name: [facility]  Outcome: Pt declined. No skill need. No payer source for LTB.     Agency/Facility Name: [facility]  Outcome: no answer. CCA left voicemail.     Agency/Facility Name: [facility]  Spoke To: Hillary   Outcome: referral pending . Ask CCA for additional information. Will do and onsite eval today. CCA notified LSW.       Rachel LSW notified.

## 2019-07-15 NOTE — CARE PLAN
Problem: Pain Management  Goal: Pain level will decrease to patient's comfort goal  Outcome: PROGRESSING AS EXPECTED  Assess patient's pain level Q2H and provide interventions as indicated      Problem: Skin Integrity  Goal: Risk for impaired skin integrity will decrease  Outcome: PROGRESSING AS EXPECTED  Monitor for signs & symptoms of skin breakdown; reposition patient & restraints Q2H

## 2019-07-15 NOTE — DISCHARGE PLANNING
Agency/Facility Name: [facility]  Spoke To: Hillary   Outcome: referral pending. Will re look at referral when Pt is out of the ICU.     Agency/Facility Name: [facility]  Spoke To: Shanda  Outcome: referral pending. Will re look at referral when Pt is out of the ICU.       Rachel ZUNIGA notified.

## 2019-07-15 NOTE — THERAPY
"Physical Therapy Evaluation completed.   Bed Mobility:  Supine to Sit: Moderate Assist  Transfers: Sit to Stand: Moderate Assist (x2)  Gait: Level Of Assist: Unable to Participate with Will Continue to Assess for Equipment Needs       Plan of Care: Will benefit from Physical Therapy 3 times per week  Discharge Recommendations: Equipment: Will Continue to Assess for Equipment Needs. Post-acute therapy Discharge to a transitional care facility for continued skilled therapy services.    See \"Rehab Therapy-Acute\" Patient Summary Report for complete documentation.     Pt is a 67 y/o female that presents to acute care physical therapy w/ a R occiptial ICH secondary to a GLF. Pt was intoxicated when she fell. Pt demonstrates impairments in bed mobility, transfers, and balance as pt required assistance to complete mobility tasks. Will continue to follow pt while in house to address impairments. Currently recommend post acute care physical therapy to address impairments.  "

## 2019-07-15 NOTE — PROGRESS NOTES
2 RN skin check completed     Devices in place:              *Cardaic leads              *Pulse ox              *BP cuff              *SCDs    *Midline IV  Skin assessed under the devices listed above     Following areas of concern:               *Posterior head intact and blanching              *ears are intact and blanching              *scattered bruising on upper extremities              *Scattered bruising on lower extremities and scabs on knees.              *Sacral region is intact and blanching. There is a red scab on the buttocks that is blanching.               *Heels are intact and blanching     The following interventions in place:              *Patient participating in Q 2 hour repositioning              *Preventative mepilex in place               *low loss air mattress              *Pillows in use to float elbows and heels      Wound consult placedYES/NO: N\A    Wound reported YES/NO: N\A  Appropriate LDAs opened YES/NO: N\A

## 2019-07-15 NOTE — PROGRESS NOTES
2 RN skin check complete with ARNAUD Purdy.  Devices in place:              *Cardaic leads              *Pulse ox              *SCDs    *Nasal Cannula  Skin assessed under the devices listed above:    *Padding placed under nasal cannula     Following areas of concern:               *Posterior head intact and blanching              *Ears are intact and blanching              *Scattered bruising on upper/lower extremities              *Sacral region is intact and blanching              *Heels are intact and blanching     The following interventions in place:              *Patient participating in Q2H repositioning               *Patient participating in mobility              *Low loss air mattress              *Pillows in use to float elbows and heels      Wound consult placedYES/NO: N\A    Wound reported YES/NO: N\A  Appropriate LDAs opened YES/NO: N\A

## 2019-07-15 NOTE — DISCHARGE PLANNING
Agency/Facility Name: [facility]  Outcome: Pt accepted.     Rachel ZUNIGA notified.

## 2019-07-15 NOTE — PROGRESS NOTES
Neurosurgery Progress Note    Subjective:  Transferred to ICU r/t delirium,    Exam:  FC's x 4, not cooperative with full strength testing    CT head /:  1. Stable small irregular hyperdensity in the right periventricular occipital white matter which may represent small hemorrhage as detailed above.  2.  There is no significant change from prior study.    Pulse  Av.5  Min: 82  Max: 102  Resp  Av.1  Min: 18  Max: 65  Monitored Temp 2  Av.8 °C (100.1 °F)  Min: 37.4 °C (99.3 °F)  Max: 38.1 °C (100.6 °F)  SpO2  Av.8 %  Min: 90 %  Max: 100 %    No Data Recorded    Recent Labs      19   0615  07/14/19   0417  07/15/19   0303   WBC  6.9  10.1  7.7   RBC  3.57*  3.72*  3.48*   HEMOGLOBIN  13.4  13.9  12.9   HEMATOCRIT  38.4  40.7  38.5   MCV  107.6*  109.4*  110.6*   MCH  37.5*  37.4*  37.1*   MCHC  34.9  34.2  33.5*   RDW  46.5  48.3  48.4   PLATELETCT  151*  179  205   MPV  9.8  9.9  10.3     Recent Labs      19   0122  07/14/19   0417  07/15/19   0303   SODIUM  130*  130*  132*   POTASSIUM  3.5*  3.8  3.9   CHLORIDE  103  101  104   CO2  18*  19*  21   GLUCOSE  142*  149*  127*   BUN  8  8  16   CREATININE  0.57  0.70  0.69   CALCIUM  7.9*  8.5  8.3*               Intake/Output       19 - 07/15/19 0659 07/15/19 0700 - 19 0659       Total [id number] Total       Intake    I.V.  394.2  600 994.2  --  -- --    Volume (mL) (NS infusion) 394.2 600 994.2 -- -- --    Other  --  90 90  --  -- --    Medications (PO/Enteral Liquids) --  -- -- --    NG/GT  300  500 800  --  -- --    Intake (mL) (Enteral Tube 19 Cortrak - Gastric Right nare) 300 500 800 -- -- --    Total Intake 694.2 1190 1884.2 -- -- --       Output    Urine  435  485 920  --  -- --    Output (mL) (Urethral Catheter Latex 16 Fr.) 435 212 920 -- -- --    Stool  --  -- --  --  -- --    Number of Times Stooled -- 0 x 0 x -- -- --    Total Output 435 921 920 -- -- --       Net  I/O     259.2 705 964.2 -- -- --            Intake/Output Summary (Last 24 hours) at 07/15/19 1010  Last data filed at 07/15/19 0600   Gross per 24 hour   Intake          1784.17 ml   Output              760 ml   Net          1024.17 ml            • NS   Continuous   • LORazepam  4 mg Q15 MIN PRN    Or   • LORazepam  3 mg Q15 MIN PRN    Or   • LORazepam  2 mg Q15 MIN PRN    Or   • LORazepam  1 mg Q15 MIN PRN   • thiamine  100 mg DAILY    And   • multivitamin  1 Tab DAILY    And   • folic acid  1 mg DAILY   • Pharmacy  1 Each PHARMACY TO DOSE   • docusate sodium 100mg/10mL  100 mg BID   • levETIRAcetam  500 mg Q12HRS   • levothyroxine  75 mcg AM ES   • magnesium hydroxide  30 mL DAILY   • polyethylene glycol/lytes  1 Packet BID   • senna-docusate  1 Tab Nightly   • sodium chloride  1 g TID WITH MEALS   • acetaminophen  650 mg Q6HRS PRN   • labetalol  10 mg Q4HRS PRN   • lidocaine  1 Patch Q24HR   • Respiratory Care per Protocol   Continuous RT   • Pharmacy Consult Request  1 Each PHARMACY TO DOSE   • senna-docusate  1 Tab Q24HRS PRN   • bisacodyl  10 mg Q24HRS PRN   • fleet  1 Each Once PRN   • ondansetron  4 mg Q4HRS PRN       Assessment and Plan:  Hospital day # &  Small right occipital ICH r/t ground level fall  CT head 7/14/19: stable  Prophylactic anticoagulation: no         Start date/time: d     Keppra seizure prophylaxis     Hyponatremia: admission , on supplemental sodium tabs     Acute delirium/alcohol and drug withdrawal: PT/PTT WNL, CIWA, ICU care/monitoring.  D/w Dr. Parra

## 2019-07-15 NOTE — DISCHARGE PLANNING
Pt new to unit. LSW chart reviewed. [facility] referral sent. [place] and [place] have accepted. LSW requested [facility] for follow up with other pending referrals.      Addendum: CCA provided LSW updates re SNF. Pt's daughter, Brigida [phone], at bedside and requested to speak with LSW. LSW and Brigida and Brigida's spouse met in Case Management office. Brigida called her brother and we discussed dc planning & goals for pt. LSW informed family that [facility] referral was sent and some facilities have accepted. Family pleased. Brigida concerned re pt's substance abuse. Per Brigida, pt has mh hx of depression and also SI. Brigida and her brother are concerned for pt's overall well-being. Brigida believes that pt needs to transition to a assisted living facility after SNF and brother agrees.     Brigida is requesting a psych consult when pt becomes more alert/oriented due to pt's previous SI/attempts. SW validated Brigida and provided emotional support to whole family. Brigida grateful after conversation. No addtional questions/concerns at this time.

## 2019-07-15 NOTE — CARE PLAN
Problem: Bowel/Gastric:  Goal: Will not experience complications related to bowel motility    Intervention: Implement Bowel Protocol, if applicable  Bowel protocol implemented       Problem: Psychosocial Needs:  Goal: Level of anxiety will decrease    Intervention: Identify and develop with patient strategies to cope with anxiety triggers  Discussion regarding anxiety and agitation and appropriate coping mechanisms

## 2019-07-15 NOTE — THERAPY
"Occupational Therapy Evaluation completed.   Functional Status:  Max A toileting, Total A LB dressing, Max A stand pivot txf EOB>Chair  Plan of Care: Will benefit from Occupational Therapy 3 times per week  Discharge Recommendations:  Equipment: Will Continue to Assess for Equipment Needs. Post-acute therapy Recommend post-acute placement for additional occupational therapy services prior to discharge home. Patient can tolerate post-acute therapies at a 5x/week frequency.    See \"Rehab Therapy-Acute\" Patient Summary Report for complete documentation.      Pt is a 67 y/o female who presents to acute following a GLF resulting in a ICH. Pt generally lethargic and w/ poor insight throughout session. Pt performed stand pivot EOB <>BSC w/ max A for safety. Pt required max A for personal hygiene following toileting. Pt demo'd impaired balance, functional mobility, activity tolerance and cognition impacting functional independence. Will follow for Acute OT services, recommend post acute placement.   "

## 2019-07-16 ENCOUNTER — APPOINTMENT (OUTPATIENT)
Dept: RADIOLOGY | Facility: MEDICAL CENTER | Age: 69
DRG: 086 | End: 2019-07-16
Attending: SURGERY
Payer: MEDICARE

## 2019-07-16 LAB
ALBUMIN SERPL BCP-MCNC: 3.2 G/DL (ref 3.2–4.9)
ALBUMIN/GLOB SERPL: 0.9 G/DL
ALP SERPL-CCNC: 106 U/L (ref 30–99)
ALT SERPL-CCNC: 30 U/L (ref 2–50)
ANION GAP SERPL CALC-SCNC: 10 MMOL/L (ref 0–11.9)
AST SERPL-CCNC: 30 U/L (ref 12–45)
BASOPHILS # BLD AUTO: 0.3 % (ref 0–1.8)
BASOPHILS # BLD: 0.03 K/UL (ref 0–0.12)
BILIRUB SERPL-MCNC: 0.7 MG/DL (ref 0.1–1.5)
BUN SERPL-MCNC: 19 MG/DL (ref 8–22)
CALCIUM SERPL-MCNC: 9.4 MG/DL (ref 8.5–10.5)
CHLORIDE SERPL-SCNC: 100 MMOL/L (ref 96–112)
CO2 SERPL-SCNC: 21 MMOL/L (ref 20–33)
CREAT SERPL-MCNC: 0.56 MG/DL (ref 0.5–1.4)
EOSINOPHIL # BLD AUTO: 0.22 K/UL (ref 0–0.51)
EOSINOPHIL NFR BLD: 2.4 % (ref 0–6.9)
ERYTHROCYTE [DISTWIDTH] IN BLOOD BY AUTOMATED COUNT: 46.3 FL (ref 35.9–50)
GLOBULIN SER CALC-MCNC: 3.5 G/DL (ref 1.9–3.5)
GLUCOSE SERPL-MCNC: 129 MG/DL (ref 65–99)
HCT VFR BLD AUTO: 39.7 % (ref 37–47)
HGB BLD-MCNC: 13.8 G/DL (ref 12–16)
IMM GRANULOCYTES # BLD AUTO: 0.03 K/UL (ref 0–0.11)
IMM GRANULOCYTES NFR BLD AUTO: 0.3 % (ref 0–0.9)
LYMPHOCYTES # BLD AUTO: 1.59 K/UL (ref 1–4.8)
LYMPHOCYTES NFR BLD: 17.5 % (ref 22–41)
MAGNESIUM SERPL-MCNC: 1.7 MG/DL (ref 1.5–2.5)
MCH RBC QN AUTO: 37.2 PG (ref 27–33)
MCHC RBC AUTO-ENTMCNC: 34.8 G/DL (ref 33.6–35)
MCV RBC AUTO: 107 FL (ref 81.4–97.8)
MONOCYTES # BLD AUTO: 1.33 K/UL (ref 0–0.85)
MONOCYTES NFR BLD AUTO: 14.6 % (ref 0–13.4)
NEUTROPHILS # BLD AUTO: 5.91 K/UL (ref 2–7.15)
NEUTROPHILS NFR BLD: 64.9 % (ref 44–72)
NRBC # BLD AUTO: 0 K/UL
NRBC BLD-RTO: 0 /100 WBC
PHOSPHATE SERPL-MCNC: 2.8 MG/DL (ref 2.5–4.5)
PLATELET # BLD AUTO: 242 K/UL (ref 164–446)
PMV BLD AUTO: 10.5 FL (ref 9–12.9)
POTASSIUM SERPL-SCNC: 4.2 MMOL/L (ref 3.6–5.5)
PROT SERPL-MCNC: 6.7 G/DL (ref 6–8.2)
RBC # BLD AUTO: 3.71 M/UL (ref 4.2–5.4)
SODIUM SERPL-SCNC: 131 MMOL/L (ref 135–145)
WBC # BLD AUTO: 9.1 K/UL (ref 4.8–10.8)

## 2019-07-16 PROCEDURE — 700111 HCHG RX REV CODE 636 W/ 250 OVERRIDE (IP): Performed by: SURGERY

## 2019-07-16 PROCEDURE — 700102 HCHG RX REV CODE 250 W/ 637 OVERRIDE(OP): Performed by: SURGERY

## 2019-07-16 PROCEDURE — 80053 COMPREHEN METABOLIC PANEL: CPT

## 2019-07-16 PROCEDURE — 99233 SBSQ HOSP IP/OBS HIGH 50: CPT | Performed by: SURGERY

## 2019-07-16 PROCEDURE — 83735 ASSAY OF MAGNESIUM: CPT

## 2019-07-16 PROCEDURE — 85025 COMPLETE CBC W/AUTO DIFF WBC: CPT

## 2019-07-16 PROCEDURE — 306565 RIGID MIT RESTRAINT(PAIR): Performed by: SURGERY

## 2019-07-16 PROCEDURE — A9270 NON-COVERED ITEM OR SERVICE: HCPCS | Performed by: SURGERY

## 2019-07-16 PROCEDURE — 700101 HCHG RX REV CODE 250: Performed by: SURGERY

## 2019-07-16 PROCEDURE — 700105 HCHG RX REV CODE 258: Performed by: SURGERY

## 2019-07-16 PROCEDURE — 770022 HCHG ROOM/CARE - ICU (200)

## 2019-07-16 PROCEDURE — 84100 ASSAY OF PHOSPHORUS: CPT

## 2019-07-16 RX ORDER — LORAZEPAM 1 MG/1
1 TABLET ORAL EVERY 8 HOURS
Status: DISCONTINUED | OUTPATIENT
Start: 2019-07-16 | End: 2019-07-17

## 2019-07-16 RX ORDER — LISINOPRIL 20 MG/1
40 TABLET ORAL
Status: DISCONTINUED | OUTPATIENT
Start: 2019-07-16 | End: 2019-07-19

## 2019-07-16 RX ORDER — LISINOPRIL 40 MG/1
40 TABLET ORAL DAILY
Status: DISCONTINUED | OUTPATIENT
Start: 2019-07-16 | End: 2019-07-16

## 2019-07-16 RX ADMIN — SODIUM CHLORIDE: 9 INJECTION, SOLUTION INTRAVENOUS at 21:17

## 2019-07-16 RX ADMIN — LEVETIRACETAM 500 MG: 100 SOLUTION ORAL at 05:10

## 2019-07-16 RX ADMIN — LEVOTHYROXINE SODIUM 75 MCG: 75 TABLET ORAL at 05:07

## 2019-07-16 RX ADMIN — LORAZEPAM 3 MG: 2 INJECTION INTRAMUSCULAR; INTRAVENOUS at 12:34

## 2019-07-16 RX ADMIN — LORAZEPAM 1 MG: 1 TABLET ORAL at 13:08

## 2019-07-16 RX ADMIN — LORAZEPAM 2 MG: 2 INJECTION INTRAMUSCULAR; INTRAVENOUS at 04:28

## 2019-07-16 RX ADMIN — LORAZEPAM 3 MG: 2 INJECTION INTRAMUSCULAR; INTRAVENOUS at 09:06

## 2019-07-16 RX ADMIN — FOLIC ACID 1 MG: 1 TABLET ORAL at 05:09

## 2019-07-16 RX ADMIN — LORAZEPAM 1 MG: 1 TABLET ORAL at 21:11

## 2019-07-16 RX ADMIN — LISINOPRIL 40 MG: 20 TABLET ORAL at 10:41

## 2019-07-16 RX ADMIN — LEVETIRACETAM 500 MG: 100 SOLUTION ORAL at 17:20

## 2019-07-16 RX ADMIN — LORAZEPAM 2 MG: 2 INJECTION INTRAMUSCULAR; INTRAVENOUS at 05:43

## 2019-07-16 RX ADMIN — SODIUM CHLORIDE TAB 1 GM 2 G: 1 TAB at 10:41

## 2019-07-16 RX ADMIN — SODIUM CHLORIDE TAB 1 GM 2 G: 1 TAB at 07:06

## 2019-07-16 RX ADMIN — Medication 100 MG: at 05:08

## 2019-07-16 RX ADMIN — THERA TABS 1 TABLET: TAB at 05:07

## 2019-07-16 RX ADMIN — LORAZEPAM 2 MG: 2 INJECTION INTRAMUSCULAR; INTRAVENOUS at 08:12

## 2019-07-16 RX ADMIN — SENNOSIDES, DOCUSATE SODIUM 1 TABLET: 50; 8.6 TABLET, FILM COATED ORAL at 21:11

## 2019-07-16 RX ADMIN — SODIUM CHLORIDE TAB 1 GM 2 G: 1 TAB at 17:19

## 2019-07-16 RX ADMIN — LORAZEPAM 3 MG: 2 INJECTION INTRAMUSCULAR; INTRAVENOUS at 16:11

## 2019-07-16 RX ADMIN — LABETALOL HYDROCHLORIDE 10 MG: 5 INJECTION, SOLUTION INTRAVENOUS at 12:34

## 2019-07-16 RX ADMIN — SODIUM CHLORIDE: 9 INJECTION, SOLUTION INTRAVENOUS at 05:07

## 2019-07-16 RX ADMIN — LORAZEPAM 3 MG: 2 INJECTION INTRAMUSCULAR; INTRAVENOUS at 18:40

## 2019-07-16 RX ADMIN — LORAZEPAM 0.5 MG: 1 TABLET ORAL at 05:07

## 2019-07-16 RX ADMIN — LORAZEPAM 2 MG: 2 INJECTION INTRAMUSCULAR; INTRAVENOUS at 07:06

## 2019-07-16 RX ADMIN — LABETALOL HYDROCHLORIDE 10 MG: 5 INJECTION, SOLUTION INTRAVENOUS at 06:13

## 2019-07-16 NOTE — DISCHARGE PLANNING
LSW spoke with pt's daughter at bedside per her request. LSW provided emotional support and answered all her questions. No additional questions/concerns at this time.

## 2019-07-16 NOTE — CARE PLAN
Problem: Safety  Goal: Will remain free from falls    Intervention: Assess risk factors for falls  Patient is a high fall risk, she is experiencing hallucinations and delusions. Reassuring patient she is in a safe place, administering Ativan as needed. Bed alarm on and in place, treaded slippers in place.       Problem: Mobility  Goal: Risk for activity intolerance will decrease    Intervention: Encourage patient to increase activity level in collaboration with Interdisciplinary Team  Encouraging patient to increase mobility. Patient is quite tremulous, and is not very stable on her feet. Will continue to assess patient ability to increase mobility. Encouraging patient to sit up for meals, and get up to commode to for stooling. Patient agreed to get up but requires a 2 person assist for weakness and instability.

## 2019-07-16 NOTE — PROGRESS NOTES
Trauma / Surgical Daily Progress Note    Date of Service  7/15/2019    Chief Complaint  68 y.o. female admitted 7/9/2019 with ICH (intracerebral hemorrhage) (HCC)    Interval Events    Much improved agitation with the addition of ativan  With history of daily use of benzodiazapine will continue scheduled low dose  Increase salt tabs for low Na and follow  BLE duplex negative - no lovenox until cleared by neurosurgery  Continue prophylactic Keppra x 7 days    Review of Systems  Review of Systems   Unable to perform ROS: Mental status change        Vital Signs for last 24 hours  Pulse:  [] 105  Resp:  [17-58] 58  SpO2:  [90 %-100 %] 98 %    Hemodynamic parameters for last 24 hours       Respiratory Data     Respiration: (!) 58, Pulse Oximetry: 98 %        RUL Breath Sounds: Clear, RML Breath Sounds: Clear, RLL Breath Sounds: Diminished, CAIN Breath Sounds: Clear, LLL Breath Sounds: Diminished    Physical Exam  Physical Exam   Constitutional: She appears well-developed and well-nourished. No distress. Nasal cannula in place.   HENT:   Head: Normocephalic and atraumatic.   Eyes: Pupils are equal, round, and reactive to light. Conjunctivae and EOM are normal.   Neck: Normal range of motion. Neck supple. JVD present. No tracheal deviation present. No thyromegaly present.   Cardiovascular: Normal rate, regular rhythm and intact distal pulses.    Pulmonary/Chest: Effort normal. No respiratory distress. She exhibits no tenderness.   Abdominal: Soft. Bowel sounds are normal. She exhibits no distension. There is no tenderness. There is no guarding.   Genitourinary:   Genitourinary Comments: Ross to gravity.   Musculoskeletal: She exhibits tenderness (left shoulder).   Ambulatory   Lymphadenopathy:     She has no cervical adenopathy.   Neurological: She is alert. No cranial nerve deficit. GCS eye subscore is 4. GCS verbal subscore is 5. GCS motor subscore is 6.   Skin: Skin is warm and dry.   Nursing note and vitals  reviewed.      Laboratory  Recent Results (from the past 24 hour(s))   CBC WITH DIFFERENTIAL    Collection Time: 07/15/19  3:03 AM   Result Value Ref Range    WBC 7.7 4.8 - 10.8 K/uL    RBC 3.48 (L) 4.20 - 5.40 M/uL    Hemoglobin 12.9 12.0 - 16.0 g/dL    Hematocrit 38.5 37.0 - 47.0 %    .6 (H) 81.4 - 97.8 fL    MCH 37.1 (H) 27.0 - 33.0 pg    MCHC 33.5 (L) 33.6 - 35.0 g/dL    RDW 48.4 35.9 - 50.0 fL    Platelet Count 205 164 - 446 K/uL    MPV 10.3 9.0 - 12.9 fL    Neutrophils-Polys 65.10 44.00 - 72.00 %    Lymphocytes 15.80 (L) 22.00 - 41.00 %    Monocytes 15.70 (H) 0.00 - 13.40 %    Eosinophils 2.60 0.00 - 6.90 %    Basophils 0.50 0.00 - 1.80 %    Immature Granulocytes 0.30 0.00 - 0.90 %    Nucleated RBC 0.00 /100 WBC    Neutrophils (Absolute) 5.02 2.00 - 7.15 K/uL    Lymphs (Absolute) 1.22 1.00 - 4.80 K/uL    Monos (Absolute) 1.21 (H) 0.00 - 0.85 K/uL    Eos (Absolute) 0.20 0.00 - 0.51 K/uL    Baso (Absolute) 0.04 0.00 - 0.12 K/uL    Immature Granulocytes (abs) 0.02 0.00 - 0.11 K/uL    NRBC (Absolute) 0.00 K/uL   Comp Metabolic Panel    Collection Time: 07/15/19  3:03 AM   Result Value Ref Range    Sodium 132 (L) 135 - 145 mmol/L    Potassium 3.9 3.6 - 5.5 mmol/L    Chloride 104 96 - 112 mmol/L    Co2 21 20 - 33 mmol/L    Anion Gap 7.0 0.0 - 11.9    Glucose 127 (H) 65 - 99 mg/dL    Bun 16 8 - 22 mg/dL    Creatinine 0.69 0.50 - 1.40 mg/dL    Calcium 8.3 (L) 8.5 - 10.5 mg/dL    AST(SGOT) 23 12 - 45 U/L    ALT(SGPT) 29 2 - 50 U/L    Alkaline Phosphatase 86 30 - 99 U/L    Total Bilirubin 0.5 0.1 - 1.5 mg/dL    Albumin 3.1 (L) 3.2 - 4.9 g/dL    Total Protein 6.0 6.0 - 8.2 g/dL    Globulin 2.9 1.9 - 3.5 g/dL    A-G Ratio 1.1 g/dL   MAGNESIUM    Collection Time: 07/15/19  3:03 AM   Result Value Ref Range    Magnesium 1.7 1.5 - 2.5 mg/dL   ESTIMATED GFR    Collection Time: 07/15/19  3:03 AM   Result Value Ref Range    GFR If African American >60 >60 mL/min/1.73 m 2    GFR If Non African American >60 >60 mL/min/1.73  m 2       Fluids    Intake/Output Summary (Last 24 hours) at 07/15/19 1737  Last data filed at 07/15/19 1200   Gross per 24 hour   Intake             2030 ml   Output              810 ml   Net             1220 ml       Core Measures & Quality Metrics  Labs reviewed, Medications reviewed and Radiology images reviewed  Ross catheter: Critically Ill - Requiring Accurate Measurement of Urinary Output      DVT Prophylaxis: Contraindicated - High bleeding risk (ICB)  DVT prophylaxis - mechanical: SCDs  Ulcer prophylaxis: Not indicated    Assessed for rehab: Patient returned to prior level of function, rehabilitation not indicated at this time    ADDY Score  ETOH Screening    Assessment/Plan  Benzodiazepine withdrawal with delirium (HCC)   Assessment & Plan    Patient takes unknown quantity of benzodiazepines she obtains from [facility]  7/12  Acute withdrawal to ICU with phenobarb prootcol  7/13  Changed to ativan as phenobarb ineffective   7/14  Periods of decreased responsiveness - repeat CT head OK  7/15  Scheduled low dose ativan     Alcohol abuse- (present on admission)   Assessment & Plan    Blood alcohol levelat [facility] 0.25.  7/11 Brief intervention completed.   - Withdrawal symptoms noted / CIWA, rally bag and multivitamins and Librium initiated.   7/12  Brief intervention completed again. Pt more forthcoming this assessment and family at bedside.  7/12  Acute withdrawal to ICU with phenobarb prootcol  7/13  Changed to ativan as phenobarb ineffective   7/14  Periods of decreased responsiveness - repeat CT head Ok     Contraindication to deep vein thrombosis (DVT) prophylaxis- (present on admission)   Assessment & Plan    Initial systemic anticoagulation contraindicated secondary to elevated bleeding risk.  RAP score 3.  7/10 Surveillance venous duplex scanning negative for above knee DVT.  Ambulate TID when able     Traumatic hemorrhage of right cerebrum without loss of consciousness (HCC)- (present on  admission)   Assessment & Plan    Traumatic hemorrhage of right cerebrum.  Repeat head CT completed.  Additional head CT stable.  Non-operative management.   Post traumatic pharmacologic seizure prophylaxis for 1 week.  Speech Language Pathology cognitive evaluation.  Kashmir Parra MD. Neurosurgery     Left shoulder pain- (present on admission)   Assessment & Plan    History of chronic left shoulder pain with decreased ROM.  Persistent pain.  7/11 Dedicated films negative for acute injury.     Hypertension- (present on admission)   Assessment & Plan    Chronic condition treated with Lisinopril.  Resume maintenance medication.     Hypothyroidism- (present on admission)   Assessment & Plan    Chronic condition treated with Levothyroxine.  Resume maintenance medication.     UTI (urinary tract infection)- (present on admission)   Assessment & Plan    Admission UA positive.  Antibiotics initiated.     Trauma- (present on admission)   Assessment & Plan    Ground level fall. Intoxicated fell in bathroom. Also has a UTI.  T-5000 Activation. Transferred from [facility].  Hazel Cano MD. Trauma Surgery.         Discussed patient condition with Family, RN, RT, Therapies, Pharmacy and Patient.  CRITICAL CARE TIME EXCLUDING PROCEDURES: 40 minutes

## 2019-07-16 NOTE — ASSESSMENT & PLAN NOTE
Patient takes unknown quantity of benzodiazepines she obtains from [facility]  7/12  Acute withdrawal to ICU with phenobarb prootcol  7/13  Changed to ativan as phenobarb ineffective   7/14  Periods of decreased responsiveness - repeat CT head OK  7/17  Increase scheduled ativan and prn  7/19 Well maintained on scheduled ativan  7/20 Ativan stopped

## 2019-07-16 NOTE — CARE PLAN
Problem: Pain Management  Goal: Pain level will decrease to patient's comfort goal  Outcome: PROGRESSING AS EXPECTED  Assess pain Q2H and provide interventions as indicated    Problem: Skin Integrity  Goal: Risk for impaired skin integrity will decrease  Outcome: PROGRESSING AS EXPECTED  Monitor for signs & symptoms of skin breakdown; reposition wrist restraints Q2H

## 2019-07-16 NOTE — DIETARY
Nutrition services: day 7 of admit.  69 yo female admitted following a ground level fall with ICH.    Evaluation:   1. Pt unable to safely/adequately take po diet secondary to ETOH withdrawals  2. Impact 1.5 TF started on 7/14.  No need for Trauma formula will change formula and rate.  3. Pt will benefit from lower calorie provision with adequate protein for healing.     Estimated nutritional needs: based on admit scale weight of 93.9 kg, and IBW 59 kg. BMI 33 - stage 1 obesity.   Calories/day: 1500 - 1600 kcals (16 - 17 kcals/kg of admit weight)  Protein/day: 82 - 118 g (1.4 - 2.0 g/kg IBW)     Malnutrition risk: na    Recommendations/Plan:   1. Change TF to Diabetisource with full goal 55 ml/hr to provide 1584 kcals, 79 g protein, 1082 ml H20/day.  2. Po diet when appropriate    Discussed with RN

## 2019-07-17 LAB
ANION GAP SERPL CALC-SCNC: 8 MMOL/L (ref 0–11.9)
BASOPHILS # BLD AUTO: 0.6 % (ref 0–1.8)
BASOPHILS # BLD: 0.05 K/UL (ref 0–0.12)
BUN SERPL-MCNC: 20 MG/DL (ref 8–22)
CALCIUM SERPL-MCNC: 8.8 MG/DL (ref 8.5–10.5)
CHLORIDE SERPL-SCNC: 101 MMOL/L (ref 96–112)
CO2 SERPL-SCNC: 22 MMOL/L (ref 20–33)
CREAT SERPL-MCNC: 0.49 MG/DL (ref 0.5–1.4)
EOSINOPHIL # BLD AUTO: 0.23 K/UL (ref 0–0.51)
EOSINOPHIL NFR BLD: 3 % (ref 0–6.9)
ERYTHROCYTE [DISTWIDTH] IN BLOOD BY AUTOMATED COUNT: 47.3 FL (ref 35.9–50)
GLUCOSE SERPL-MCNC: 138 MG/DL (ref 65–99)
HCT VFR BLD AUTO: 39.7 % (ref 37–47)
HGB BLD-MCNC: 13.2 G/DL (ref 12–16)
IMM GRANULOCYTES # BLD AUTO: 0.02 K/UL (ref 0–0.11)
IMM GRANULOCYTES NFR BLD AUTO: 0.3 % (ref 0–0.9)
LYMPHOCYTES # BLD AUTO: 1.57 K/UL (ref 1–4.8)
LYMPHOCYTES NFR BLD: 20.3 % (ref 22–41)
MAGNESIUM SERPL-MCNC: 1.7 MG/DL (ref 1.5–2.5)
MCH RBC QN AUTO: 35.9 PG (ref 27–33)
MCHC RBC AUTO-ENTMCNC: 33.2 G/DL (ref 33.6–35)
MCV RBC AUTO: 107.9 FL (ref 81.4–97.8)
MONOCYTES # BLD AUTO: 1.03 K/UL (ref 0–0.85)
MONOCYTES NFR BLD AUTO: 13.3 % (ref 0–13.4)
NEUTROPHILS # BLD AUTO: 4.84 K/UL (ref 2–7.15)
NEUTROPHILS NFR BLD: 62.5 % (ref 44–72)
NRBC # BLD AUTO: 0 K/UL
NRBC BLD-RTO: 0 /100 WBC
PHOSPHATE SERPL-MCNC: 3.7 MG/DL (ref 2.5–4.5)
PLATELET # BLD AUTO: 285 K/UL (ref 164–446)
PMV BLD AUTO: 10.1 FL (ref 9–12.9)
POTASSIUM SERPL-SCNC: 4.3 MMOL/L (ref 3.6–5.5)
RBC # BLD AUTO: 3.68 M/UL (ref 4.2–5.4)
SODIUM SERPL-SCNC: 131 MMOL/L (ref 135–145)
WBC # BLD AUTO: 7.7 K/UL (ref 4.8–10.8)

## 2019-07-17 PROCEDURE — 700102 HCHG RX REV CODE 250 W/ 637 OVERRIDE(OP): Performed by: SURGERY

## 2019-07-17 PROCEDURE — 85025 COMPLETE CBC W/AUTO DIFF WBC: CPT

## 2019-07-17 PROCEDURE — A9270 NON-COVERED ITEM OR SERVICE: HCPCS | Performed by: SURGERY

## 2019-07-17 PROCEDURE — 97530 THERAPEUTIC ACTIVITIES: CPT

## 2019-07-17 PROCEDURE — 700111 HCHG RX REV CODE 636 W/ 250 OVERRIDE (IP): Performed by: SURGERY

## 2019-07-17 PROCEDURE — 700112 HCHG RX REV CODE 229: Performed by: SURGERY

## 2019-07-17 PROCEDURE — 84100 ASSAY OF PHOSPHORUS: CPT

## 2019-07-17 PROCEDURE — 80048 BASIC METABOLIC PNL TOTAL CA: CPT

## 2019-07-17 PROCEDURE — 99233 SBSQ HOSP IP/OBS HIGH 50: CPT | Performed by: SURGERY

## 2019-07-17 PROCEDURE — 83735 ASSAY OF MAGNESIUM: CPT

## 2019-07-17 PROCEDURE — 770022 HCHG ROOM/CARE - ICU (200)

## 2019-07-17 RX ORDER — LORAZEPAM 2 MG/1
2 TABLET ORAL EVERY 8 HOURS
Status: DISCONTINUED | OUTPATIENT
Start: 2019-07-17 | End: 2019-07-19

## 2019-07-17 RX ADMIN — LORAZEPAM 2 MG: 2 INJECTION INTRAMUSCULAR; INTRAVENOUS at 19:42

## 2019-07-17 RX ADMIN — LORAZEPAM 2 MG: 2 INJECTION INTRAMUSCULAR; INTRAVENOUS at 15:32

## 2019-07-17 RX ADMIN — LORAZEPAM 2 MG: 2 TABLET ORAL at 22:24

## 2019-07-17 RX ADMIN — LORAZEPAM 2 MG: 2 INJECTION INTRAMUSCULAR; INTRAVENOUS at 05:38

## 2019-07-17 RX ADMIN — LEVOTHYROXINE SODIUM 75 MCG: 75 TABLET ORAL at 05:01

## 2019-07-17 RX ADMIN — LISINOPRIL 40 MG: 20 TABLET ORAL at 05:01

## 2019-07-17 RX ADMIN — SODIUM CHLORIDE TAB 1 GM 2 G: 1 TAB at 09:08

## 2019-07-17 RX ADMIN — SODIUM CHLORIDE TAB 1 GM 2 G: 1 TAB at 13:06

## 2019-07-17 RX ADMIN — ACETAMINOPHEN 650 MG: 325 TABLET, FILM COATED ORAL at 19:42

## 2019-07-17 RX ADMIN — SODIUM CHLORIDE TAB 1 GM 2 G: 1 TAB at 17:14

## 2019-07-17 RX ADMIN — ACETAMINOPHEN 650 MG: 325 TABLET, FILM COATED ORAL at 09:12

## 2019-07-17 RX ADMIN — LORAZEPAM 2 MG: 2 TABLET ORAL at 13:06

## 2019-07-17 RX ADMIN — DOCUSATE SODIUM 100 MG: 50 LIQUID ORAL at 05:01

## 2019-07-17 RX ADMIN — LORAZEPAM 3 MG: 2 INJECTION INTRAMUSCULAR; INTRAVENOUS at 19:12

## 2019-07-17 RX ADMIN — LORAZEPAM 2 MG: 2 INJECTION INTRAMUSCULAR; INTRAVENOUS at 20:21

## 2019-07-17 RX ADMIN — LORAZEPAM 1 MG: 1 TABLET ORAL at 05:01

## 2019-07-17 RX ADMIN — LEVETIRACETAM 500 MG: 100 SOLUTION ORAL at 05:01

## 2019-07-17 ASSESSMENT — COGNITIVE AND FUNCTIONAL STATUS - GENERAL
DRESSING REGULAR UPPER BODY CLOTHING: A LOT
TURNING FROM BACK TO SIDE WHILE IN FLAT BAD: UNABLE
HELP NEEDED FOR BATHING: TOTAL
STANDING UP FROM CHAIR USING ARMS: TOTAL
MOBILITY SCORE: 6
CLIMB 3 TO 5 STEPS WITH RAILING: TOTAL
DAILY ACTIVITIY SCORE: 8
DRESSING REGULAR LOWER BODY CLOTHING: TOTAL
MOVING TO AND FROM BED TO CHAIR: UNABLE
MOVING FROM LYING ON BACK TO SITTING ON SIDE OF FLAT BED: UNABLE
SUGGESTED CMS G CODE MODIFIER DAILY ACTIVITY: CM
PERSONAL GROOMING: A LOT
EATING MEALS: TOTAL
WALKING IN HOSPITAL ROOM: TOTAL
TOILETING: TOTAL
SUGGESTED CMS G CODE MODIFIER MOBILITY: CN

## 2019-07-17 ASSESSMENT — ENCOUNTER SYMPTOMS
AGITATION: 1
CONFUSION: 1

## 2019-07-17 ASSESSMENT — GAIT ASSESSMENTS: GAIT LEVEL OF ASSIST: UNABLE TO PARTICIPATE

## 2019-07-17 NOTE — THERAPY
"Pt seen for PT tx session. Pt very lethargic upon PT arrival, minimal improvement in alertness once EOB. Attempted to facilitate anterior weight shift, x5 reps, at EOB for improved seated balance. Pt most motivated to participate with return BTB, continued to require Max A to complete task. PT will continue to follow while in house.     Physical Therapy Treatment completed.   Bed Mobility:  Supine to Sit: Total Assist  Transfers: Sit to Stand: Refused  Gait: Level Of Assist: Unable to Participate       Plan of Care: Will benefit from Physical Therapy 3 times per week  Discharge Recommendations: Equipment: Will Continue to Assess for Equipment Needs. Post-acute therapy: Recommend post acute placement prior to DC home.        See \"Rehab Therapy-Acute\" Patient Summary Report for complete documentation.       "

## 2019-07-17 NOTE — THERAPY
"Occupational Therapy Treatment completed with focus on ADLs, ADL transfers and patient education.  Functional Status:  Total A x2 supine>sit EOB, Max A LB dressing, Mod A UB dressing (gown management), Max A x2 sit>supine  Plan of Care: Will benefit from Occupational Therapy 3 times per week  Discharge Recommendations:  Equipment Will Continue to Assess for Equipment Needs. Post-acute therapy Recommend post-acute placement for additional occupational therapy services prior to discharge home. Patient can tolerate post-acute therapies at a 5x/week frequency.    See \"Rehab Therapy-Acute\" Patient Summary Report for complete documentation.       Pt appeared to have decline in function as she is now detoxing. Pt sat EOB and participated in sitting balance activities in preparation for performing seated ADLs. Pt attempted multiple times to put herself back to bed and required cues to maintain eyes open. Pt demo'd impaired balance, activity tolerance, functional mobility and cognitive function impacting functional independence. Will continue to follow for Acute OT services. Recommend post acute placement.   "

## 2019-07-17 NOTE — PROGRESS NOTES
Trauma / Surgical Daily Progress Note    Date of Service  7/16/2019    Chief Complaint  68 y.o. female admitted 7/9/2019 with ICH (intracerebral hemorrhage) (HCC)    Interval Events    Significant quantities of ativan required through the night  Agitation improved  Remains on tube feeds via coretrack    Review of Systems  Review of Systems   Unable to perform ROS: Mental status change        Vital Signs for last 24 hours  Pulse:  [] 95  Resp:  [12-81] 31  SpO2:  [94 %-97 %] 95 %    Hemodynamic parameters for last 24 hours       Respiratory Data     Respiration: (!) 31, Pulse Oximetry: 95 %        RUL Breath Sounds: Clear, RML Breath Sounds: Clear, RLL Breath Sounds: Diminished, CAIN Breath Sounds: Clear, LLL Breath Sounds: Diminished    Physical Exam  Physical Exam   Constitutional: She appears well-developed and well-nourished. No distress. Nasal cannula in place.   HENT:   Head: Normocephalic and atraumatic.   Nasal cortrack bridled   Eyes: Pupils are equal, round, and reactive to light. Conjunctivae and EOM are normal.   Neck: Normal range of motion. Neck supple. No JVD present. No tracheal deviation present. No thyromegaly present.   Cardiovascular: Normal rate, regular rhythm and intact distal pulses.    Pulmonary/Chest: Effort normal and breath sounds normal. No respiratory distress. She exhibits no tenderness.   Abdominal: Soft. Bowel sounds are normal. She exhibits no distension. There is no tenderness. There is no guarding.   Genitourinary:   Genitourinary Comments: Ross to gravity.   Musculoskeletal: She exhibits tenderness (left shoulder).   Ambulatory   Lymphadenopathy:     She has no cervical adenopathy.   Neurological: She is alert. No cranial nerve deficit. GCS eye subscore is 4. GCS verbal subscore is 5. GCS motor subscore is 6.   Oriented x1  Non focal / RUVALCABA   Skin: Skin is warm and dry.   Nursing note and vitals reviewed.      Laboratory  Recent Results (from the past 24 hour(s))   CBC WITH  DIFFERENTIAL    Collection Time: 07/16/19  5:20 AM   Result Value Ref Range    WBC 9.1 4.8 - 10.8 K/uL    RBC 3.71 (L) 4.20 - 5.40 M/uL    Hemoglobin 13.8 12.0 - 16.0 g/dL    Hematocrit 39.7 37.0 - 47.0 %    .0 (H) 81.4 - 97.8 fL    MCH 37.2 (H) 27.0 - 33.0 pg    MCHC 34.8 33.6 - 35.0 g/dL    RDW 46.3 35.9 - 50.0 fL    Platelet Count 242 164 - 446 K/uL    MPV 10.5 9.0 - 12.9 fL    Neutrophils-Polys 64.90 44.00 - 72.00 %    Lymphocytes 17.50 (L) 22.00 - 41.00 %    Monocytes 14.60 (H) 0.00 - 13.40 %    Eosinophils 2.40 0.00 - 6.90 %    Basophils 0.30 0.00 - 1.80 %    Immature Granulocytes 0.30 0.00 - 0.90 %    Nucleated RBC 0.00 /100 WBC    Neutrophils (Absolute) 5.91 2.00 - 7.15 K/uL    Lymphs (Absolute) 1.59 1.00 - 4.80 K/uL    Monos (Absolute) 1.33 (H) 0.00 - 0.85 K/uL    Eos (Absolute) 0.22 0.00 - 0.51 K/uL    Baso (Absolute) 0.03 0.00 - 0.12 K/uL    Immature Granulocytes (abs) 0.03 0.00 - 0.11 K/uL    NRBC (Absolute) 0.00 K/uL   Comp Metabolic Panel    Collection Time: 07/16/19  5:20 AM   Result Value Ref Range    Sodium 131 (L) 135 - 145 mmol/L    Potassium 4.2 3.6 - 5.5 mmol/L    Chloride 100 96 - 112 mmol/L    Co2 21 20 - 33 mmol/L    Anion Gap 10.0 0.0 - 11.9    Glucose 129 (H) 65 - 99 mg/dL    Bun 19 8 - 22 mg/dL    Creatinine 0.56 0.50 - 1.40 mg/dL    Calcium 9.4 8.5 - 10.5 mg/dL    AST(SGOT) 30 12 - 45 U/L    ALT(SGPT) 30 2 - 50 U/L    Alkaline Phosphatase 106 (H) 30 - 99 U/L    Total Bilirubin 0.7 0.1 - 1.5 mg/dL    Albumin 3.2 3.2 - 4.9 g/dL    Total Protein 6.7 6.0 - 8.2 g/dL    Globulin 3.5 1.9 - 3.5 g/dL    A-G Ratio 0.9 g/dL   PHOSPHORUS    Collection Time: 07/16/19  5:20 AM   Result Value Ref Range    Phosphorus 2.8 2.5 - 4.5 mg/dL   MAGNESIUM    Collection Time: 07/16/19  5:20 AM   Result Value Ref Range    Magnesium 1.7 1.5 - 2.5 mg/dL   ESTIMATED GFR    Collection Time: 07/16/19  5:20 AM   Result Value Ref Range    GFR If African American >60 >60 mL/min/1.73 m 2    GFR If Non African  American >60 >60 mL/min/1.73 m 2       Fluids    Intake/Output Summary (Last 24 hours) at 07/16/19 1939  Last data filed at 07/16/19 1800   Gross per 24 hour   Intake             2545 ml   Output             2235 ml   Net              310 ml       Core Measures & Quality Metrics  Labs reviewed, Medications reviewed and Radiology images reviewed  Ross catheter: Critically Ill - Requiring Accurate Measurement of Urinary Output      DVT Prophylaxis: Contraindicated - High bleeding risk (ICB)  DVT prophylaxis - mechanical: SCDs  Ulcer prophylaxis: Not indicated    Assessed for rehab: Patient returned to prior level of function, rehabilitation not indicated at this time    ADDY Score  ETOH Screening    Assessment/Plan  Benzodiazepine withdrawal with delirium (HCC)   Assessment & Plan    Patient takes unknown quantity of benzodiazepines she obtains from [facility]  7/12  Acute withdrawal to ICU with phenobarb prootcol  7/13  Changed to ativan as phenobarb ineffective   7/14  Periods of decreased responsiveness - repeat CT head OK  7/16  Scheduled ativan and prn     Alcohol abuse- (present on admission)   Assessment & Plan    Blood alcohol levelat [facility] Garcia 0.25.  7/11 Brief intervention completed.   - Withdrawal symptoms noted / CIWA, rally bag and multivitamins and Librium initiated.   7/12  Brief intervention completed again. Pt more forthcoming this assessment and family at bedside.  7/12  Acute withdrawal to ICU with phenobarb prootcol  7/13  Changed to ativan as phenobarb ineffective   7/14  Periods of decreased responsiveness - repeat CT head Ok     Contraindication to deep vein thrombosis (DVT) prophylaxis- (present on admission)   Assessment & Plan    Initial systemic anticoagulation contraindicated secondary to elevated bleeding risk.  RAP score 3.  7/10 Surveillance venous duplex scanning negative for above knee DVT.  Ambulate TID when able     Traumatic hemorrhage of right cerebrum without loss of consciousness  (HCC)- (present on admission)   Assessment & Plan    Traumatic hemorrhage of right cerebrum.  Repeat head CT completed.  Additional head CT stable.  Non-operative management.   Post traumatic pharmacologic seizure prophylaxis for 1 week.  Speech Language Pathology cognitive evaluation.  Kashmir Parra MD. Neurosurgery     Left shoulder pain- (present on admission)   Assessment & Plan    History of chronic left shoulder pain with decreased ROM.  Persistent pain.  7/11 Dedicated films negative for acute injury.     Hypertension- (present on admission)   Assessment & Plan    Chronic condition treated with Lisinopril.  Resume maintenance medication.     Hypothyroidism- (present on admission)   Assessment & Plan    Chronic condition treated with Levothyroxine.  Resume maintenance medication.     UTI (urinary tract infection)- (present on admission)   Assessment & Plan    Admission UA positive.  Antibiotics initiated.     Trauma- (present on admission)   Assessment & Plan    Ground level fall. Intoxicated fell in bathroom. Also has a UTI.  T-5000 Activation. Transferred from [facility].  Hazel Cano MD. Trauma Surgery.         Discussed patient condition with Family, RN, RT, Pharmacy, Patient and trauma surgery.  CRITICAL CARE TIME EXCLUDING PROCEDURES: 39 minutes

## 2019-07-17 NOTE — DISCHARGE PLANNING
LSW briefly met with DTR at bedside and updated her on SNF referrals.DTR grateful for update.     [facility]. [facility] & [facility] want to re-evaluate pt once out of ICU - possibly conduct an onsite eval. [facility] has declined.     Plan: Assist as needed

## 2019-07-17 NOTE — CARE PLAN
Problem: Psychosocial Needs:  Goal: Level of anxiety will decrease    Intervention: Identify and develop with patient strategies to cope with anxiety triggers  Using ativan protocol appropriately for patient withdrawal, assessing at least q1h. Patient receiving PO and IV ativan. Seizure precautions in place.         Problem: Safety - Medical Restraint  Goal: Remains free of injury from restraints (Restraint for Interference with Medical Device)  INTERVENTIONS:  1. Determine that other, less restrictive measures have been tried or would not be effective before applying the restraint  2. Evaluate the patient's condition at the time of restraint application  3. Inform patient/family regarding the reason for restraint  4. Q2H: Monitor safety, psychosocial status, comfort, nutrition and hydration   Outcome: PROGRESSING AS EXPECTED  Medical restraints repositioned, pulses checked on UEs. Padded to ensure not to cut off circulation. Perfusion appropriate to limb.

## 2019-07-17 NOTE — CARE PLAN
Problem: Pain Management  Goal: Pain level will decrease to patient's comfort goal  Outcome: PROGRESSING AS EXPECTED  Assess patient's pain level Q2H and provide interventions as indicated    Problem: Skin Integrity  Goal: Risk for impaired skin integrity will decrease  Outcome: PROGRESSING AS EXPECTED  Monitor for signs & symptoms of skin breakdown; provide Q2H repositioning

## 2019-07-18 LAB
ANION GAP SERPL CALC-SCNC: 7 MMOL/L (ref 0–11.9)
BASOPHILS # BLD AUTO: 1 % (ref 0–1.8)
BASOPHILS # BLD: 0.06 K/UL (ref 0–0.12)
BUN SERPL-MCNC: 20 MG/DL (ref 8–22)
CALCIUM SERPL-MCNC: 7.9 MG/DL (ref 8.5–10.5)
CHLORIDE SERPL-SCNC: 104 MMOL/L (ref 96–112)
CO2 SERPL-SCNC: 24 MMOL/L (ref 20–33)
CREAT SERPL-MCNC: 0.49 MG/DL (ref 0.5–1.4)
EOSINOPHIL # BLD AUTO: 0.32 K/UL (ref 0–0.51)
EOSINOPHIL NFR BLD: 5.3 % (ref 0–6.9)
ERYTHROCYTE [DISTWIDTH] IN BLOOD BY AUTOMATED COUNT: 48.6 FL (ref 35.9–50)
GLUCOSE SERPL-MCNC: 117 MG/DL (ref 65–99)
HCT VFR BLD AUTO: 33.4 % (ref 37–47)
HGB BLD-MCNC: 11 G/DL (ref 12–16)
IMM GRANULOCYTES # BLD AUTO: 0.02 K/UL (ref 0–0.11)
IMM GRANULOCYTES NFR BLD AUTO: 0.3 % (ref 0–0.9)
LYMPHOCYTES # BLD AUTO: 1.34 K/UL (ref 1–4.8)
LYMPHOCYTES NFR BLD: 22.1 % (ref 22–41)
MAGNESIUM SERPL-MCNC: 1.8 MG/DL (ref 1.5–2.5)
MCH RBC QN AUTO: 36.1 PG (ref 27–33)
MCHC RBC AUTO-ENTMCNC: 32.9 G/DL (ref 33.6–35)
MCV RBC AUTO: 109.5 FL (ref 81.4–97.8)
MONOCYTES # BLD AUTO: 1.13 K/UL (ref 0–0.85)
MONOCYTES NFR BLD AUTO: 18.7 % (ref 0–13.4)
NEUTROPHILS # BLD AUTO: 3.18 K/UL (ref 2–7.15)
NEUTROPHILS NFR BLD: 52.6 % (ref 44–72)
NRBC # BLD AUTO: 0 K/UL
NRBC BLD-RTO: 0 /100 WBC
PHOSPHATE SERPL-MCNC: 3.3 MG/DL (ref 2.5–4.5)
PLATELET # BLD AUTO: 268 K/UL (ref 164–446)
PMV BLD AUTO: 10 FL (ref 9–12.9)
POTASSIUM SERPL-SCNC: 4 MMOL/L (ref 3.6–5.5)
RBC # BLD AUTO: 3.05 M/UL (ref 4.2–5.4)
SODIUM SERPL-SCNC: 135 MMOL/L (ref 135–145)
WBC # BLD AUTO: 6.1 K/UL (ref 4.8–10.8)

## 2019-07-18 PROCEDURE — 83735 ASSAY OF MAGNESIUM: CPT

## 2019-07-18 PROCEDURE — 80048 BASIC METABOLIC PNL TOTAL CA: CPT

## 2019-07-18 PROCEDURE — A9270 NON-COVERED ITEM OR SERVICE: HCPCS | Performed by: SURGERY

## 2019-07-18 PROCEDURE — 770022 HCHG ROOM/CARE - ICU (200)

## 2019-07-18 PROCEDURE — 99233 SBSQ HOSP IP/OBS HIGH 50: CPT | Performed by: SURGERY

## 2019-07-18 PROCEDURE — 85025 COMPLETE CBC W/AUTO DIFF WBC: CPT

## 2019-07-18 PROCEDURE — 700102 HCHG RX REV CODE 250 W/ 637 OVERRIDE(OP): Performed by: SURGERY

## 2019-07-18 PROCEDURE — 84100 ASSAY OF PHOSPHORUS: CPT

## 2019-07-18 PROCEDURE — 700111 HCHG RX REV CODE 636 W/ 250 OVERRIDE (IP): Performed by: SURGERY

## 2019-07-18 RX ADMIN — SODIUM CHLORIDE TAB 1 GM 2 G: 1 TAB at 12:20

## 2019-07-18 RX ADMIN — LORAZEPAM 2 MG: 2 INJECTION INTRAMUSCULAR; INTRAVENOUS at 08:54

## 2019-07-18 RX ADMIN — SODIUM CHLORIDE TAB 1 GM 2 G: 1 TAB at 07:50

## 2019-07-18 RX ADMIN — LORAZEPAM 2 MG: 2 TABLET ORAL at 05:28

## 2019-07-18 RX ADMIN — LORAZEPAM 2 MG: 2 TABLET ORAL at 21:41

## 2019-07-18 RX ADMIN — LISINOPRIL 40 MG: 20 TABLET ORAL at 05:28

## 2019-07-18 RX ADMIN — SODIUM CHLORIDE TAB 1 GM 2 G: 1 TAB at 17:30

## 2019-07-18 RX ADMIN — LORAZEPAM 2 MG: 2 INJECTION INTRAMUSCULAR; INTRAVENOUS at 03:35

## 2019-07-18 RX ADMIN — LORAZEPAM 2 MG: 2 TABLET ORAL at 14:20

## 2019-07-18 RX ADMIN — LORAZEPAM 1 MG: 2 INJECTION INTRAMUSCULAR; INTRAVENOUS at 05:29

## 2019-07-18 RX ADMIN — LEVOTHYROXINE SODIUM 75 MCG: 75 TABLET ORAL at 05:29

## 2019-07-18 NOTE — PROGRESS NOTES
Trauma / Surgical Daily Progress Note    Date of Service  7/17/2019    Chief Complaint  68 y.o. female admitted 7/9/2019 with ICH (intracerebral hemorrhage) (HCC)    Interval Events     Less agitated today  Tearful at time   Increase scheduled ativan  Discussion with daughter at bedside    Review of Systems  Review of Systems   Psychiatric/Behavioral: Positive for agitation and confusion.        Vital Signs for last 24 hours  Pulse:  [80-98] 98  Resp:  [24-95] 35  SpO2:  [90 %-100 %] 93 %    Hemodynamic parameters for last 24 hours       Respiratory Data     Respiration: (!) 35, Pulse Oximetry: 93 %        RUL Breath Sounds: Clear, RML Breath Sounds: Clear, RLL Breath Sounds: Diminished, CAIN Breath Sounds: Clear, LLL Breath Sounds: Diminished    Physical Exam  Physical Exam   Constitutional: She appears well-developed and well-nourished. No distress. Nasal cannula in place.   HENT:   Head: Normocephalic and atraumatic.   Nasal cortrack bridled   Eyes: Pupils are equal, round, and reactive to light. Conjunctivae and EOM are normal.   Neck: Normal range of motion. Neck supple. No JVD present. No tracheal deviation present. No thyromegaly present.   Cardiovascular: Normal rate, regular rhythm and intact distal pulses.    Pulmonary/Chest: Effort normal and breath sounds normal. No respiratory distress. She exhibits no tenderness.   Abdominal: Soft. Bowel sounds are normal. She exhibits no distension. There is no tenderness. There is no guarding.   Genitourinary:   Genitourinary Comments: Ross to gravity.   Musculoskeletal: She exhibits tenderness (left shoulder).   Lymphadenopathy:     She has no cervical adenopathy.   Neurological: She is alert. No cranial nerve deficit. GCS eye subscore is 4. GCS verbal subscore is 5. GCS motor subscore is 6.   Oriented x1  Non focal / RUVALCABA   Skin: Skin is warm and dry.   Nursing note and vitals reviewed.      Laboratory  Recent Results (from the past 24 hour(s))   CBC WITH  DIFFERENTIAL    Collection Time: 07/17/19  4:48 AM   Result Value Ref Range    WBC 7.7 4.8 - 10.8 K/uL    RBC 3.68 (L) 4.20 - 5.40 M/uL    Hemoglobin 13.2 12.0 - 16.0 g/dL    Hematocrit 39.7 37.0 - 47.0 %    .9 (H) 81.4 - 97.8 fL    MCH 35.9 (H) 27.0 - 33.0 pg    MCHC 33.2 (L) 33.6 - 35.0 g/dL    RDW 47.3 35.9 - 50.0 fL    Platelet Count 285 164 - 446 K/uL    MPV 10.1 9.0 - 12.9 fL    Neutrophils-Polys 62.50 44.00 - 72.00 %    Lymphocytes 20.30 (L) 22.00 - 41.00 %    Monocytes 13.30 0.00 - 13.40 %    Eosinophils 3.00 0.00 - 6.90 %    Basophils 0.60 0.00 - 1.80 %    Immature Granulocytes 0.30 0.00 - 0.90 %    Nucleated RBC 0.00 /100 WBC    Neutrophils (Absolute) 4.84 2.00 - 7.15 K/uL    Lymphs (Absolute) 1.57 1.00 - 4.80 K/uL    Monos (Absolute) 1.03 (H) 0.00 - 0.85 K/uL    Eos (Absolute) 0.23 0.00 - 0.51 K/uL    Baso (Absolute) 0.05 0.00 - 0.12 K/uL    Immature Granulocytes (abs) 0.02 0.00 - 0.11 K/uL    NRBC (Absolute) 0.00 K/uL   PHOSPHORUS    Collection Time: 07/17/19  4:48 AM   Result Value Ref Range    Phosphorus 3.7 2.5 - 4.5 mg/dL   MAGNESIUM    Collection Time: 07/17/19  4:48 AM   Result Value Ref Range    Magnesium 1.7 1.5 - 2.5 mg/dL   Basic Metabolic Panel    Collection Time: 07/17/19  4:48 AM   Result Value Ref Range    Sodium 131 (L) 135 - 145 mmol/L    Potassium 4.3 3.6 - 5.5 mmol/L    Chloride 101 96 - 112 mmol/L    Co2 22 20 - 33 mmol/L    Glucose 138 (H) 65 - 99 mg/dL    Bun 20 8 - 22 mg/dL    Creatinine 0.49 (L) 0.50 - 1.40 mg/dL    Calcium 8.8 8.5 - 10.5 mg/dL    Anion Gap 8.0 0.0 - 11.9   ESTIMATED GFR    Collection Time: 07/17/19  4:48 AM   Result Value Ref Range    GFR If African American >60 >60 mL/min/1.73 m 2    GFR If Non African American >60 >60 mL/min/1.73 m 2       Fluids    Intake/Output Summary (Last 24 hours) at 07/17/19 2115  Last data filed at 07/17/19 2000   Gross per 24 hour   Intake          2760.83 ml   Output              970 ml   Net          1790.83 ml       Core  Measures & Quality Metrics  Labs reviewed, Medications reviewed and Radiology images reviewed  Ross catheter: Critically Ill - Requiring Accurate Measurement of Urinary Output      DVT Prophylaxis: Contraindicated - High bleeding risk (ICB)  DVT prophylaxis - mechanical: SCDs  Ulcer prophylaxis: Not indicated    Assessed for rehab: Patient returned to prior level of function, rehabilitation not indicated at this time    ADDY Score  ETOH Screening    Assessment/Plan  Benzodiazepine withdrawal with delirium (HCC)   Assessment & Plan    Patient takes unknown quantity of benzodiazepines she obtains from [facility]  7/12  Acute withdrawal to ICU with phenobarb prootcol  7/13  Changed to ativan as phenobarb ineffective   7/14  Periods of decreased responsiveness - repeat CT head OK  7/17  Increase scheduled ativan and prn     Contraindication to deep vein thrombosis (DVT) prophylaxis- (present on admission)   Assessment & Plan    Initial systemic anticoagulation contraindicated secondary to elevated bleeding risk.  RAP score 3.  7/10 Surveillance venous duplex scanning negative for above knee DVT.  Ambulate TID when able     Traumatic hemorrhage of right cerebrum without loss of consciousness (HCC)- (present on admission)   Assessment & Plan    Traumatic hemorrhage of right cerebrum.  Repeat head CT completed.  Additional head CT stable.  Non-operative management.   Post traumatic pharmacologic seizure prophylaxis for 1 week.  Speech Language Pathology cognitive evaluation.  Kashmir Parra MD. Neurosurgery     Alcohol abuse- (present on admission)   Assessment & Plan    Blood alcohol levelat Jaxson Garcia 0.25.  7/11 Brief intervention completed.   - Withdrawal symptoms noted / CIWA, rally bag and multivitamins and Librium initiated.   7/12  Brief intervention completed again. Pt more forthcoming this assessment and family at bedside.  7/12  Acute withdrawal to ICU with phenobarb prootcol  7/13  Changed to ativan as phenobarb  ineffective   7/14  Periods of decreased responsiveness - repeat CT head Ok     Left shoulder pain- (present on admission)   Assessment & Plan    History of chronic left shoulder pain with decreased ROM.  Persistent pain.  7/11 Dedicated films negative for acute injury.     Hypertension- (present on admission)   Assessment & Plan    Chronic condition treated with Lisinopril.  Resume maintenance medication.     Hypothyroidism- (present on admission)   Assessment & Plan    Chronic condition treated with Levothyroxine.  Resume maintenance medication.     UTI (urinary tract infection)- (present on admission)   Assessment & Plan    Admission UA positive.  Antibiotics initiated.     Trauma- (present on admission)   Assessment & Plan    Ground level fall. Intoxicated fell in bathroom. Also has a UTI.  T-5000 Activation. Transferred from [facility].  Hazel Cano MD. Trauma Surgery.         Discussed patient condition with Family, RN, RT, Pharmacy and Patient.  CRITICAL CARE TIME EXCLUDING PROCEDURES: 39  minutes

## 2019-07-18 NOTE — CARE PLAN
Problem: Infection  Goal: Will remain free from infection    Intervention: Implement standard precautions and perform hand washing before and after patient contact  Universal precautions such as hand hygiene in place      Problem: Psychosocial Needs:  Goal: Level of anxiety will decrease    Intervention: Identify and develop with patient strategies to cope with anxiety triggers  Discussion with patient to identify anxiety triggers and appropriate strategies to cope

## 2019-07-18 NOTE — PROGRESS NOTES
2 RN skin check complete with ARNAUD Slade.  Devices in place:    *Cardiac leads    *BP cuff   *Midline IV   *SCDs   *Ross catheter   Skin assessed under the devices listed above     Following areas of concern:    *Posterior head intact and blanching   *Ears are intact and blanching   *Elbow are red but intact and blanching   *Scattered bruising on upper and lower extremities   *scabs noted on bilateral knees, calves, and scab on right ankle    *Redness and IAD noted on thighs    *Sacral region is intact and blanching. Small red/pink scab noted on buttocks.   *Heels are intact and blanching     The following interventions in place:   *Low air loss mattress    *Patient participating in Q 2 hour turning   *Patient participating in mobility   *Pillows used to float heels and elbows.    *Preventative mepilex in place on sacral region    *Barrier cream applied on thighs    *Hair cleaned with shower cap and brushed     Wound consult placedYES/NO: N\A    Wound reported YES/NO: N\A  Appropriate LDAs opened YES/NO: N\A

## 2019-07-18 NOTE — CARE PLAN
Problem: Safety  Goal: Will remain free from injury  Outcome: PROGRESSING AS EXPECTED  Bed alarm on and in place, treaded socks on, educated on how to use call light, demonstrated how to use appropriately.       Problem: Knowledge Deficit  Goal: Knowledge of disease process/condition, treatment plan, diagnostic tests, and medications will improve    Intervention: Explain information regarding disease process/condition, treatment plan, diagnostic tests, and medications and document in education  Educated patient on the importance to remain in bed, and not get up without assistance. Educated on withdrawal and fever as well Ativan.

## 2019-07-19 LAB
ANION GAP SERPL CALC-SCNC: 9 MMOL/L (ref 0–11.9)
BASOPHILS # BLD AUTO: 1.2 % (ref 0–1.8)
BASOPHILS # BLD: 0.08 K/UL (ref 0–0.12)
BUN SERPL-MCNC: 15 MG/DL (ref 8–22)
CALCIUM SERPL-MCNC: 8.4 MG/DL (ref 8.5–10.5)
CHLORIDE SERPL-SCNC: 102 MMOL/L (ref 96–112)
CO2 SERPL-SCNC: 21 MMOL/L (ref 20–33)
CREAT SERPL-MCNC: 0.42 MG/DL (ref 0.5–1.4)
EOSINOPHIL # BLD AUTO: 0.23 K/UL (ref 0–0.51)
EOSINOPHIL NFR BLD: 3.4 % (ref 0–6.9)
ERYTHROCYTE [DISTWIDTH] IN BLOOD BY AUTOMATED COUNT: 47.6 FL (ref 35.9–50)
GLUCOSE SERPL-MCNC: 108 MG/DL (ref 65–99)
HCT VFR BLD AUTO: 34.4 % (ref 37–47)
HGB BLD-MCNC: 11.3 G/DL (ref 12–16)
IMM GRANULOCYTES # BLD AUTO: 0.02 K/UL (ref 0–0.11)
IMM GRANULOCYTES NFR BLD AUTO: 0.3 % (ref 0–0.9)
LYMPHOCYTES # BLD AUTO: 1.43 K/UL (ref 1–4.8)
LYMPHOCYTES NFR BLD: 21.4 % (ref 22–41)
MAGNESIUM SERPL-MCNC: 1.6 MG/DL (ref 1.5–2.5)
MCH RBC QN AUTO: 35.8 PG (ref 27–33)
MCHC RBC AUTO-ENTMCNC: 32.8 G/DL (ref 33.6–35)
MCV RBC AUTO: 108.9 FL (ref 81.4–97.8)
MONOCYTES # BLD AUTO: 1.05 K/UL (ref 0–0.85)
MONOCYTES NFR BLD AUTO: 15.7 % (ref 0–13.4)
NEUTROPHILS # BLD AUTO: 3.86 K/UL (ref 2–7.15)
NEUTROPHILS NFR BLD: 58 % (ref 44–72)
NRBC # BLD AUTO: 0 K/UL
NRBC BLD-RTO: 0 /100 WBC
PLATELET # BLD AUTO: 314 K/UL (ref 164–446)
PMV BLD AUTO: 10.2 FL (ref 9–12.9)
POTASSIUM SERPL-SCNC: 3.6 MMOL/L (ref 3.6–5.5)
RBC # BLD AUTO: 3.16 M/UL (ref 4.2–5.4)
SODIUM SERPL-SCNC: 132 MMOL/L (ref 135–145)
TSH SERPL DL<=0.005 MIU/L-ACNC: 2.82 UIU/ML (ref 0.38–5.33)
WBC # BLD AUTO: 6.7 K/UL (ref 4.8–10.8)

## 2019-07-19 PROCEDURE — 99233 SBSQ HOSP IP/OBS HIGH 50: CPT | Performed by: SURGERY

## 2019-07-19 PROCEDURE — 97535 SELF CARE MNGMENT TRAINING: CPT

## 2019-07-19 PROCEDURE — A9270 NON-COVERED ITEM OR SERVICE: HCPCS | Performed by: SURGERY

## 2019-07-19 PROCEDURE — 97116 GAIT TRAINING THERAPY: CPT

## 2019-07-19 PROCEDURE — 700111 HCHG RX REV CODE 636 W/ 250 OVERRIDE (IP): Performed by: SURGERY

## 2019-07-19 PROCEDURE — 97530 THERAPEUTIC ACTIVITIES: CPT

## 2019-07-19 PROCEDURE — 80048 BASIC METABOLIC PNL TOTAL CA: CPT

## 2019-07-19 PROCEDURE — 700102 HCHG RX REV CODE 250 W/ 637 OVERRIDE(OP): Performed by: SURGERY

## 2019-07-19 PROCEDURE — 85025 COMPLETE CBC W/AUTO DIFF WBC: CPT

## 2019-07-19 PROCEDURE — 84443 ASSAY THYROID STIM HORMONE: CPT

## 2019-07-19 PROCEDURE — 770022 HCHG ROOM/CARE - ICU (200)

## 2019-07-19 PROCEDURE — 83735 ASSAY OF MAGNESIUM: CPT

## 2019-07-19 RX ORDER — SODIUM CHLORIDE 1 G/1
2 TABLET ORAL
Status: DISCONTINUED | OUTPATIENT
Start: 2019-07-19 | End: 2019-07-22 | Stop reason: HOSPADM

## 2019-07-19 RX ORDER — POLYETHYLENE GLYCOL 3350 17 G/17G
1 POWDER, FOR SOLUTION ORAL 2 TIMES DAILY
Status: DISCONTINUED | OUTPATIENT
Start: 2019-07-19 | End: 2019-07-22 | Stop reason: HOSPADM

## 2019-07-19 RX ORDER — LISINOPRIL 20 MG/1
40 TABLET ORAL
Status: DISCONTINUED | OUTPATIENT
Start: 2019-07-19 | End: 2019-07-22 | Stop reason: HOSPADM

## 2019-07-19 RX ORDER — POTASSIUM CHLORIDE 20 MEQ/1
40 TABLET, EXTENDED RELEASE ORAL ONCE
Status: COMPLETED | OUTPATIENT
Start: 2019-07-19 | End: 2019-07-19

## 2019-07-19 RX ORDER — LEVOTHYROXINE SODIUM 0.07 MG/1
75 TABLET ORAL
Status: DISCONTINUED | OUTPATIENT
Start: 2019-07-19 | End: 2019-07-22 | Stop reason: HOSPADM

## 2019-07-19 RX ORDER — DOCUSATE SODIUM 50 MG/5ML
100 LIQUID ORAL 2 TIMES DAILY
Status: DISCONTINUED | OUTPATIENT
Start: 2019-07-19 | End: 2019-07-20

## 2019-07-19 RX ORDER — AMOXICILLIN 250 MG
1 CAPSULE ORAL NIGHTLY
Status: DISCONTINUED | OUTPATIENT
Start: 2019-07-19 | End: 2019-07-22 | Stop reason: HOSPADM

## 2019-07-19 RX ORDER — ACETAMINOPHEN 325 MG/1
650 TABLET ORAL EVERY 6 HOURS PRN
Status: DISCONTINUED | OUTPATIENT
Start: 2019-07-19 | End: 2019-07-22 | Stop reason: HOSPADM

## 2019-07-19 RX ORDER — LORAZEPAM 2 MG/1
2 TABLET ORAL EVERY 8 HOURS
Status: DISCONTINUED | OUTPATIENT
Start: 2019-07-19 | End: 2019-07-20

## 2019-07-19 RX ORDER — MAGNESIUM SULFATE HEPTAHYDRATE 40 MG/ML
2 INJECTION, SOLUTION INTRAVENOUS ONCE
Status: COMPLETED | OUTPATIENT
Start: 2019-07-19 | End: 2019-07-19

## 2019-07-19 RX ORDER — AMOXICILLIN 250 MG
1 CAPSULE ORAL
Status: DISCONTINUED | OUTPATIENT
Start: 2019-07-19 | End: 2019-07-22 | Stop reason: HOSPADM

## 2019-07-19 RX ORDER — LISINOPRIL 20 MG/1
40 TABLET ORAL
Status: DISCONTINUED | OUTPATIENT
Start: 2019-07-19 | End: 2019-07-19

## 2019-07-19 RX ADMIN — LORAZEPAM 2 MG: 2 TABLET ORAL at 22:44

## 2019-07-19 RX ADMIN — ACETAMINOPHEN 650 MG: 325 TABLET, FILM COATED ORAL at 10:04

## 2019-07-19 RX ADMIN — POTASSIUM CHLORIDE 40 MEQ: 1500 TABLET, EXTENDED RELEASE ORAL at 11:19

## 2019-07-19 RX ADMIN — SODIUM CHLORIDE TAB 1 GM 2 G: 1 TAB at 17:23

## 2019-07-19 RX ADMIN — MAGNESIUM SULFATE IN WATER 2 G: 40 INJECTION, SOLUTION INTRAVENOUS at 11:19

## 2019-07-19 RX ADMIN — LEVOTHYROXINE SODIUM 75 MCG: 75 TABLET ORAL at 06:19

## 2019-07-19 RX ADMIN — ENOXAPARIN SODIUM 30 MG: 100 INJECTION SUBCUTANEOUS at 22:44

## 2019-07-19 RX ADMIN — LORAZEPAM 2 MG: 2 TABLET ORAL at 14:21

## 2019-07-19 RX ADMIN — LISINOPRIL 40 MG: 20 TABLET ORAL at 06:18

## 2019-07-19 RX ADMIN — SODIUM CHLORIDE TAB 1 GM 2 G: 1 TAB at 06:20

## 2019-07-19 RX ADMIN — LORAZEPAM 2 MG: 2 TABLET ORAL at 06:17

## 2019-07-19 RX ADMIN — ACETAMINOPHEN 650 MG: 325 TABLET, FILM COATED ORAL at 19:10

## 2019-07-19 RX ADMIN — SODIUM CHLORIDE TAB 1 GM 2 G: 1 TAB at 11:18

## 2019-07-19 ASSESSMENT — COGNITIVE AND FUNCTIONAL STATUS - GENERAL
WALKING IN HOSPITAL ROOM: A LITTLE
DRESSING REGULAR LOWER BODY CLOTHING: A LOT
TURNING FROM BACK TO SIDE WHILE IN FLAT BAD: A LOT
STANDING UP FROM CHAIR USING ARMS: A LITTLE
EATING MEALS: A LITTLE
SUGGESTED CMS G CODE MODIFIER DAILY ACTIVITY: CK
MOBILITY SCORE: 12
TOILETING: A LITTLE
HELP NEEDED FOR BATHING: A LOT
SUGGESTED CMS G CODE MODIFIER MOBILITY: CL
PERSONAL GROOMING: A LITTLE
DAILY ACTIVITIY SCORE: 16
CLIMB 3 TO 5 STEPS WITH RAILING: A LOT
MOVING TO AND FROM BED TO CHAIR: UNABLE
DRESSING REGULAR UPPER BODY CLOTHING: A LITTLE
MOVING FROM LYING ON BACK TO SITTING ON SIDE OF FLAT BED: UNABLE

## 2019-07-19 ASSESSMENT — GAIT ASSESSMENTS
DISTANCE (FEET): 55
ASSISTIVE DEVICE: FRONT WHEEL WALKER
DEVIATION: DECREASED BASE OF SUPPORT;BRADYKINETIC
GAIT LEVEL OF ASSIST: MINIMAL ASSIST

## 2019-07-19 NOTE — CARE PLAN
Problem: Safety  Goal: Will remain free from falls  Pt assessed for risk to fall at beginning of shift, appropriate interventions in place per flowsheets. Pt given call light and educated on use. Pt educated on need for bed alarm, bed alarm on.    Problem: Psychosocial Needs:  Goal: Level of anxiety will decrease  Pt reoriented as needed. Ativan given per MAR.

## 2019-07-19 NOTE — THERAPY
"Occupational Therapy Evaluation completed.   Functional Status:  Min A standing grooming, Min A UB dressing, Min A functional mobility w/ FWW  Plan of Care: Will benefit from Occupational Therapy 3 times per week  Discharge Recommendations:  Equipment: Will Continue to Assess for Equipment Needs. Post-acute therapy Recommend post-acute placement for additional occupational therapy services prior to discharge home. Patient can tolerate post-acute therapies at a 5x/week frequency.    See \"Rehab Therapy-Acute\" Patient Summary Report for complete documentation.      Pt demo'd improved overall alertness and motivation to participate in OOB activities. Pt required cues for pacing and overall safety throughout session. Pt performed functional mobility w/ FWW throughout room and engaged in standing grooming. Pt primarily limited by poor safety awareness, impaired balance, activity tolerance, functional mobility and generalized weakness. Will continue to follow for Acute OT services while in house. At this time continue to recommend post acute placement.   "

## 2019-07-19 NOTE — DIETARY
Nutrition services: Tube feeding discontinued and regular diet started.  Pt to be seen daily by nutrition representative for meal preferences.

## 2019-07-19 NOTE — PROGRESS NOTES
2 RN skin check complete with ARNAUD Hernandez.  Devices in place:               *Cardiac leads               *BP cuff              *Midline IV              *SCDs              *Ross catheter   Skin assessed under the devices listed above      Following areas of concern:               *Posterior head intact and blanching              *Ears are intact and blanching              *Elbow are red but intact and blanching              *Scattered bruising on upper and lower extremities              *scabs noted on bilateral knees, calves, and scab on right ankle               *Redness and IAD noted on thighs               *Sacral region is intact and blanching. Small red/pink scab noted on buttocks.              *Heels are intact and blanching      The following interventions in place:              *Low air loss mattress               *Patient participating in Q 2 hour turning              *Patient participating in mobility              *Pillows used to float heels and elbows.               *Preventative mepilex in place on sacral region               *Barrier cream applied on thighs               *Hair cleaned with shower cap and brushed      Wound consult placedYES/NO: N\A    Wound reported YES/NO: N\A  Appropriate LDAs opened YES/NO: N\A

## 2019-07-19 NOTE — PROGRESS NOTES
"Patient refusing to wear SCDs. Education provided on purpose and benefits of SCD use. Despite education, patient stating she \"doesn't want them on.\" RN will continue to educate patient.   "

## 2019-07-19 NOTE — PROGRESS NOTES
2 RN skin check complete with ARNAUD Vidal.  Devices in place BP cuff, dean, SCDs.   Skin assessed under all devices.   Preventative measures in place including q2h turns and mepilex on sacrum.  Following areas of concern:   · Blanchable redness on heels and elbows, IAD on inner thighs   The following interventions in place: heels and elbows elevated on pillows, barrier paste applied to inner thighs    Wound consult placedYES/NO: N\A    Wound reported YES/NO: N\A  Appropriate LDAs opened YES/NO: N\A

## 2019-07-19 NOTE — THERAPY
"Physical Therapy Treatment completed.   Bed Mobility:  Supine to Sit: Moderate Assist  Transfers: Sit to Stand: Minimal Assist  Gait: Level Of Assist: Minimal Assist with Front-Wheel Walker 55ft      Plan of Care: Will benefit from Physical Therapy 4 times per week  Discharge Recommendations: Equipment: Will Continue to Assess for Equipment Needs. Post-acute therapy Recommend post-acute placement for continued physical therapy services prior to discharge home. Patient can tolerate post-acute therapies at a 5x/week frequency.       Pt demonsrates significant improvements in mobility and cognition. Increased motivation-requesting to attempt to ambulate. Followed commands without issue. Initially posterior lean when standing, corrected with tactile cues. No foot clearance with initial attempts to ambulate. Progressed through pre gait training of standing weight shift to exaggerated marching. After this pt able to achieve adequate foot clearance to ambulate. Occasional sway posterior or lateral that requried min assist to correct. Due to her improvement in cognition and motivation will increase patient's frequency. At this time still recommend post acute placement for additional therapies due to her balance deficits and need for physical assist. If she improves to a supervision level during her acute stay then her deficits may be managed by home health and/or family.      See \"Rehab Therapy-Acute\" Patient Summary Report for complete documentation.       "

## 2019-07-19 NOTE — CARE PLAN
Problem: Nutritional:  Goal: Nutrition support tolerated and meeting greater than 85% of estimated needs  Outcome: MET Date Met: 07/19/19  TF off and regular diet started today

## 2019-07-19 NOTE — PROGRESS NOTES
Trauma / Surgical Daily Progress Note    Date of Service  7/18/2019    Chief Complaint  68 y.o. female admitted 7/9/2019 with ICH (intracerebral hemorrhage) (HCC)    Interval Events    Remains confused - oriented to ~ 2  Intermittent agitation  OOB to chair  Bit through feeding tube  Trial bedside swallow  Family with lots of questions for options and plan - concerned as has michelle through multiple programs    Review of Systems  Review of Systems   Unable to perform ROS: Acuity of condition        Vital Signs for last 24 hours  Pulse:  [] 103  Resp:  [13-35] 22  BP: (133-149)/() 149/102  SpO2:  [90 %-100 %] 95 %    Hemodynamic parameters for last 24 hours       Respiratory Data     Respiration: (!) 22, Pulse Oximetry: 95 %        RUL Breath Sounds: Clear, RML Breath Sounds: Clear, RLL Breath Sounds: Diminished, CAIN Breath Sounds: Clear, LLL Breath Sounds: Diminished    Physical Exam  Physical Exam   Constitutional: She appears well-developed and well-nourished. No distress. Nasal cannula in place.   HENT:   Head: Normocephalic and atraumatic.   Nasal cortrack bridled   Eyes: Pupils are equal, round, and reactive to light. Conjunctivae and EOM are normal.   Neck: Normal range of motion. Neck supple. No JVD present. No tracheal deviation present. No thyromegaly present.   Cardiovascular: Normal rate, regular rhythm and intact distal pulses.    Pulmonary/Chest: Effort normal and breath sounds normal. No respiratory distress. She exhibits no tenderness.   Abdominal: Soft. Bowel sounds are normal. She exhibits no distension. There is no tenderness. There is no guarding.   Genitourinary:   Genitourinary Comments: Ross to gravity.   Musculoskeletal: She exhibits tenderness (left shoulder).   Lymphadenopathy:     She has no cervical adenopathy.   Neurological: She is alert. No cranial nerve deficit. GCS eye subscore is 4. GCS verbal subscore is 5. GCS motor subscore is 6.   Oriented x1  Non focal / RUVLACABA   Skin:  Skin is warm and dry.   Nursing note and vitals reviewed.      Laboratory  Recent Results (from the past 24 hour(s))   CBC WITH DIFFERENTIAL    Collection Time: 07/18/19  3:45 AM   Result Value Ref Range    WBC 6.1 4.8 - 10.8 K/uL    RBC 3.05 (L) 4.20 - 5.40 M/uL    Hemoglobin 11.0 (L) 12.0 - 16.0 g/dL    Hematocrit 33.4 (L) 37.0 - 47.0 %    .5 (H) 81.4 - 97.8 fL    MCH 36.1 (H) 27.0 - 33.0 pg    MCHC 32.9 (L) 33.6 - 35.0 g/dL    RDW 48.6 35.9 - 50.0 fL    Platelet Count 268 164 - 446 K/uL    MPV 10.0 9.0 - 12.9 fL    Neutrophils-Polys 52.60 44.00 - 72.00 %    Lymphocytes 22.10 22.00 - 41.00 %    Monocytes 18.70 (H) 0.00 - 13.40 %    Eosinophils 5.30 0.00 - 6.90 %    Basophils 1.00 0.00 - 1.80 %    Immature Granulocytes 0.30 0.00 - 0.90 %    Nucleated RBC 0.00 /100 WBC    Neutrophils (Absolute) 3.18 2.00 - 7.15 K/uL    Lymphs (Absolute) 1.34 1.00 - 4.80 K/uL    Monos (Absolute) 1.13 (H) 0.00 - 0.85 K/uL    Eos (Absolute) 0.32 0.00 - 0.51 K/uL    Baso (Absolute) 0.06 0.00 - 0.12 K/uL    Immature Granulocytes (abs) 0.02 0.00 - 0.11 K/uL    NRBC (Absolute) 0.00 K/uL   PHOSPHORUS    Collection Time: 07/18/19  3:45 AM   Result Value Ref Range    Phosphorus 3.3 2.5 - 4.5 mg/dL   MAGNESIUM    Collection Time: 07/18/19  3:45 AM   Result Value Ref Range    Magnesium 1.8 1.5 - 2.5 mg/dL   Basic Metabolic Panel    Collection Time: 07/18/19  3:45 AM   Result Value Ref Range    Sodium 135 135 - 145 mmol/L    Potassium 4.0 3.6 - 5.5 mmol/L    Chloride 104 96 - 112 mmol/L    Co2 24 20 - 33 mmol/L    Glucose 117 (H) 65 - 99 mg/dL    Bun 20 8 - 22 mg/dL    Creatinine 0.49 (L) 0.50 - 1.40 mg/dL    Calcium 7.9 (L) 8.5 - 10.5 mg/dL    Anion Gap 7.0 0.0 - 11.9   ESTIMATED GFR    Collection Time: 07/18/19  3:45 AM   Result Value Ref Range    GFR If African American >60 >60 mL/min/1.73 m 2    GFR If Non African American >60 >60 mL/min/1.73 m 2       Fluids    Intake/Output Summary (Last 24 hours) at 07/18/19 5557  Last data filed  at 07/18/19 1800   Gross per 24 hour   Intake             2025 ml   Output             1275 ml   Net              750 ml       Core Measures & Quality Metrics  Labs reviewed, Medications reviewed and Radiology images reviewed  Ross catheter: Critically Ill - Requiring Accurate Measurement of Urinary Output      DVT Prophylaxis: Contraindicated - High bleeding risk (ICB)  DVT prophylaxis - mechanical: SCDs  Ulcer prophylaxis: Not indicated    Assessed for rehab: Patient returned to prior level of function, rehabilitation not indicated at this time    ADDY Score  ETOH Screening    Assessment/Plan  Benzodiazepine withdrawal with delirium (HCC)   Assessment & Plan    Patient takes unknown quantity of benzodiazepines she obtains from [facility]  7/12  Acute withdrawal to ICU with phenobarb prootcol  7/13  Changed to ativan as phenobarb ineffective   7/14  Periods of decreased responsiveness - repeat CT head OK  7/17  Increase scheduled ativan and prn     Contraindication to deep vein thrombosis (DVT) prophylaxis- (present on admission)   Assessment & Plan    Initial systemic anticoagulation contraindicated secondary to elevated bleeding risk.  RAP score 3.  7/10 Surveillance venous duplex scanning negative for above knee DVT.  Ambulate TID when able     Traumatic hemorrhage of right cerebrum without loss of consciousness (HCC)- (present on admission)   Assessment & Plan    Traumatic hemorrhage of right cerebrum.  Repeat head CT completed.  Additional head CT stable.  Non-operative management.   Post traumatic pharmacologic seizure prophylaxis for 1 week.  Speech Language Pathology cognitive evaluation.  Kashmir Parra MD. Neurosurgery     Alcohol abuse- (present on admission)   Assessment & Plan    Blood alcohol levelat [facility] 0.25.  7/11 Brief intervention completed.   - Withdrawal symptoms noted / CIWA, rally bag and multivitamins and Librium initiated.   7/12  Brief intervention completed again. Pt more forthcoming  this assessment and family at bedside.  7/12  Acute withdrawal to ICU with phenobarb prootcol  7/13  Changed to ativan as phenobarb ineffective   7/14  Periods of decreased responsiveness - repeat CT head Ok     Left shoulder pain- (present on admission)   Assessment & Plan    History of chronic left shoulder pain with decreased ROM.  Persistent pain.  7/11 Dedicated films negative for acute injury.     Hypertension- (present on admission)   Assessment & Plan    Chronic condition treated with Lisinopril.  Resume maintenance medication.     Hypothyroidism- (present on admission)   Assessment & Plan    Chronic condition treated with Levothyroxine.  Resume maintenance medication.     UTI (urinary tract infection)- (present on admission)   Assessment & Plan    Admission UA positive.  Antibiotics initiated.     Trauma- (present on admission)   Assessment & Plan    Ground level fall. Intoxicated fell in bathroom. Also has a UTI.  T-5000 Activation. Transferred from [facility].  Hazel Cano MD. Trauma Surgery.       Discussed patient condition with Family, RN, RT, Pharmacy, Patient and trauma surgery.  CRITICAL CARE TIME EXCLUDING PROCEDURES: 39  minutes

## 2019-07-19 NOTE — DISCHARGE PLANNING
"Anticipated Discharge Disposition: SNF placement     Action: LSW received a call from pt's son, Gary Colvin [phone] re POC & dispo. LSW informed Gary that SNF referrals were sent & that some determinations have been made. Per Gary's request, LSW e-mailed list of SNF determinations to Gary.     Pt's children are requesting that once pt is orientated enough & possibly out of ICU that psych can be consulted. Per children, pt had major depression & self medicates with alcohol and pills. Children stated pt has had previous SI and attempts and are concerned with pt's over all wellbeing. Children also concerned due to pt's hx of \"checking herself out\" of rehab facilities once she's admitted. Gary stated that prior to this hospitalization pt was admitted to a rehab facility (Gary didn't know the name) and pt left AMA & went home & immediately started consuming alcohol. Gary wants his mother to receive the rehab for PT/OT/etc. But also substance abuse rehab.     Barriers to Discharge: medical clearance    Plan: Assist with dc planning.     "

## 2019-07-19 NOTE — CARE PLAN
Problem: Communication  Goal: The ability to communicate needs accurately and effectively will improve    Intervention: Reorient patient to environment as needed  Reorientation of patient to environment and surroundings       Problem: Psychosocial Needs:  Goal: Level of anxiety will decrease    Intervention: Identify and develop with patient strategies to cope with anxiety triggers  Discussion with patient regarding anxiety and developing strategies to cope with stressors.

## 2019-07-20 LAB
ANION GAP SERPL CALC-SCNC: 11 MMOL/L (ref 0–11.9)
BASOPHILS # BLD AUTO: 1.2 % (ref 0–1.8)
BASOPHILS # BLD: 0.08 K/UL (ref 0–0.12)
BUN SERPL-MCNC: 11 MG/DL (ref 8–22)
CALCIUM SERPL-MCNC: 8.7 MG/DL (ref 8.5–10.5)
CHLORIDE SERPL-SCNC: 103 MMOL/L (ref 96–112)
CO2 SERPL-SCNC: 20 MMOL/L (ref 20–33)
CREAT SERPL-MCNC: 0.52 MG/DL (ref 0.5–1.4)
EOSINOPHIL # BLD AUTO: 0.25 K/UL (ref 0–0.51)
EOSINOPHIL NFR BLD: 3.7 % (ref 0–6.9)
ERYTHROCYTE [DISTWIDTH] IN BLOOD BY AUTOMATED COUNT: 46.3 FL (ref 35.9–50)
GLUCOSE SERPL-MCNC: 126 MG/DL (ref 65–99)
HCT VFR BLD AUTO: 35.2 % (ref 37–47)
HGB BLD-MCNC: 11.5 G/DL (ref 12–16)
IMM GRANULOCYTES # BLD AUTO: 0.02 K/UL (ref 0–0.11)
IMM GRANULOCYTES NFR BLD AUTO: 0.3 % (ref 0–0.9)
LYMPHOCYTES # BLD AUTO: 1.37 K/UL (ref 1–4.8)
LYMPHOCYTES NFR BLD: 20.1 % (ref 22–41)
MCH RBC QN AUTO: 35.3 PG (ref 27–33)
MCHC RBC AUTO-ENTMCNC: 32.7 G/DL (ref 33.6–35)
MCV RBC AUTO: 108 FL (ref 81.4–97.8)
MONOCYTES # BLD AUTO: 0.93 K/UL (ref 0–0.85)
MONOCYTES NFR BLD AUTO: 13.7 % (ref 0–13.4)
NEUTROPHILS # BLD AUTO: 4.16 K/UL (ref 2–7.15)
NEUTROPHILS NFR BLD: 61 % (ref 44–72)
NRBC # BLD AUTO: 0 K/UL
NRBC BLD-RTO: 0 /100 WBC
PLATELET # BLD AUTO: 399 K/UL (ref 164–446)
PMV BLD AUTO: 10.2 FL (ref 9–12.9)
POTASSIUM SERPL-SCNC: 3.5 MMOL/L (ref 3.6–5.5)
RBC # BLD AUTO: 3.26 M/UL (ref 4.2–5.4)
SODIUM SERPL-SCNC: 134 MMOL/L (ref 135–145)
WBC # BLD AUTO: 6.8 K/UL (ref 4.8–10.8)

## 2019-07-20 PROCEDURE — 80048 BASIC METABOLIC PNL TOTAL CA: CPT

## 2019-07-20 PROCEDURE — 770001 HCHG ROOM/CARE - MED/SURG/GYN PRIV*

## 2019-07-20 PROCEDURE — 700102 HCHG RX REV CODE 250 W/ 637 OVERRIDE(OP): Performed by: SURGERY

## 2019-07-20 PROCEDURE — 85025 COMPLETE CBC W/AUTO DIFF WBC: CPT

## 2019-07-20 PROCEDURE — 99233 SBSQ HOSP IP/OBS HIGH 50: CPT | Performed by: SURGERY

## 2019-07-20 PROCEDURE — A9270 NON-COVERED ITEM OR SERVICE: HCPCS | Performed by: SURGERY

## 2019-07-20 PROCEDURE — 700111 HCHG RX REV CODE 636 W/ 250 OVERRIDE (IP): Performed by: SURGERY

## 2019-07-20 RX ORDER — DOCUSATE SODIUM 100 MG/1
100 CAPSULE, LIQUID FILLED ORAL 2 TIMES DAILY
Status: DISCONTINUED | OUTPATIENT
Start: 2019-07-20 | End: 2019-07-22 | Stop reason: HOSPADM

## 2019-07-20 RX ORDER — POTASSIUM CHLORIDE 20 MEQ/1
40 TABLET, EXTENDED RELEASE ORAL 2 TIMES DAILY
Status: COMPLETED | OUTPATIENT
Start: 2019-07-20 | End: 2019-07-20

## 2019-07-20 RX ORDER — HALOPERIDOL 5 MG/ML
2-5 INJECTION INTRAMUSCULAR EVERY 4 HOURS PRN
Status: DISCONTINUED | OUTPATIENT
Start: 2019-07-20 | End: 2019-07-22 | Stop reason: HOSPADM

## 2019-07-20 RX ADMIN — SENNOSIDES, DOCUSATE SODIUM 1 TABLET: 50; 8.6 TABLET, FILM COATED ORAL at 21:16

## 2019-07-20 RX ADMIN — SODIUM CHLORIDE TAB 1 GM 2 G: 1 TAB at 11:31

## 2019-07-20 RX ADMIN — POTASSIUM CHLORIDE 40 MEQ: 1500 TABLET, EXTENDED RELEASE ORAL at 17:22

## 2019-07-20 RX ADMIN — SODIUM CHLORIDE TAB 1 GM 2 G: 1 TAB at 06:30

## 2019-07-20 RX ADMIN — ENOXAPARIN SODIUM 30 MG: 100 INJECTION SUBCUTANEOUS at 05:35

## 2019-07-20 RX ADMIN — LORAZEPAM 2 MG: 2 TABLET ORAL at 14:52

## 2019-07-20 RX ADMIN — ACETAMINOPHEN 650 MG: 325 TABLET, FILM COATED ORAL at 01:15

## 2019-07-20 RX ADMIN — ACETAMINOPHEN 650 MG: 325 TABLET, FILM COATED ORAL at 21:16

## 2019-07-20 RX ADMIN — LISINOPRIL 40 MG: 20 TABLET ORAL at 05:34

## 2019-07-20 RX ADMIN — LEVOTHYROXINE SODIUM 75 MCG: 75 TABLET ORAL at 05:34

## 2019-07-20 RX ADMIN — POTASSIUM CHLORIDE 40 MEQ: 1500 TABLET, EXTENDED RELEASE ORAL at 08:47

## 2019-07-20 RX ADMIN — ENOXAPARIN SODIUM 30 MG: 100 INJECTION SUBCUTANEOUS at 17:22

## 2019-07-20 RX ADMIN — LABETALOL HYDROCHLORIDE 10 MG: 5 INJECTION, SOLUTION INTRAVENOUS at 00:30

## 2019-07-20 RX ADMIN — SODIUM CHLORIDE TAB 1 GM 2 G: 1 TAB at 17:22

## 2019-07-20 RX ADMIN — LORAZEPAM 2 MG: 2 TABLET ORAL at 05:34

## 2019-07-20 ASSESSMENT — ENCOUNTER SYMPTOMS
TREMORS: 1
SENSORY CHANGE: 0
BLURRED VISION: 0
SPEECH CHANGE: 0
DIAPHORESIS: 0
SHORTNESS OF BREATH: 0
MYALGIAS: 1
FEVER: 0
NECK PAIN: 0
NAUSEA: 0
BACK PAIN: 0
ABDOMINAL PAIN: 0
FOCAL WEAKNESS: 0

## 2019-07-20 ASSESSMENT — LIFESTYLE VARIABLES: SUBSTANCE_ABUSE: 1

## 2019-07-20 NOTE — PROGRESS NOTES
Trauma / Surgical Daily Progress Note    Date of Service  7/20/2019    Chief Complaint  68 y.o. female admitted 7/9/2019 with ICH (intracerebral hemorrhage) (HCC)  GLF   T-5000    Interval Events  Medically cleared for transfer to Neurosurgery unit    Ativan discontinued  Transfer to neurosurgery.   Therapies continued  Pending speech evaluation.     Review of Systems  Review of Systems   Constitutional: Negative for diaphoresis and fever.   HENT: Negative for hearing loss.    Eyes: Negative for blurred vision.   Respiratory: Negative for shortness of breath.    Cardiovascular: Negative for chest pain.   Gastrointestinal: Negative for abdominal pain and nausea. Constipation: BM 7/11.        + BM 7/20   Genitourinary: Negative for dysuria (voiding).   Musculoskeletal: Positive for joint pain (left shoulder) and myalgias. Negative for back pain and neck pain.   Skin: Negative for rash.   Neurological: Positive for tremors. Negative for sensory change, speech change and focal weakness.   Psychiatric/Behavioral: Positive for substance abuse.        Vital Signs  Temp:  [36.7 °C (98 °F)-37.7 °C (99.8 °F)] 37.3 °C (99.1 °F)  Pulse:  [] 77  Resp:  [16-76] 22  SpO2:  [89 %-97 %] 90 %    Physical Exam  Physical Exam   Constitutional: She appears well-developed and well-nourished. No distress. Nasal cannula in place.   HENT:   Head: Normocephalic and atraumatic.   Eyes: Pupils are equal, round, and reactive to light. Conjunctivae and EOM are normal.   Neck: Normal range of motion. Neck supple. No JVD present. No tracheal deviation present.   Cardiovascular: Normal rate, regular rhythm and intact distal pulses.    Pulmonary/Chest: Effort normal and breath sounds normal. No respiratory distress. She exhibits no tenderness.   Abdominal: Soft. Bowel sounds are normal. She exhibits no distension. There is no tenderness.   Musculoskeletal: Tenderness: left shoulder.   Neurological: She is alert. No sensory deficit. GCS eye  subscore is 4. GCS verbal subscore is 5. GCS motor subscore is 6.   Non focal / RUVALCABA   Skin: Skin is warm and dry.   Nursing note and vitals reviewed.      Laboratory  Recent Results (from the past 24 hour(s))   CBC WITH DIFFERENTIAL    Collection Time: 07/20/19  5:10 AM   Result Value Ref Range    WBC 6.8 4.8 - 10.8 K/uL    RBC 3.26 (L) 4.20 - 5.40 M/uL    Hemoglobin 11.5 (L) 12.0 - 16.0 g/dL    Hematocrit 35.2 (L) 37.0 - 47.0 %    .0 (H) 81.4 - 97.8 fL    MCH 35.3 (H) 27.0 - 33.0 pg    MCHC 32.7 (L) 33.6 - 35.0 g/dL    RDW 46.3 35.9 - 50.0 fL    Platelet Count 399 164 - 446 K/uL    MPV 10.2 9.0 - 12.9 fL    Neutrophils-Polys 61.00 44.00 - 72.00 %    Lymphocytes 20.10 (L) 22.00 - 41.00 %    Monocytes 13.70 (H) 0.00 - 13.40 %    Eosinophils 3.70 0.00 - 6.90 %    Basophils 1.20 0.00 - 1.80 %    Immature Granulocytes 0.30 0.00 - 0.90 %    Nucleated RBC 0.00 /100 WBC    Neutrophils (Absolute) 4.16 2.00 - 7.15 K/uL    Lymphs (Absolute) 1.37 1.00 - 4.80 K/uL    Monos (Absolute) 0.93 (H) 0.00 - 0.85 K/uL    Eos (Absolute) 0.25 0.00 - 0.51 K/uL    Baso (Absolute) 0.08 0.00 - 0.12 K/uL    Immature Granulocytes (abs) 0.02 0.00 - 0.11 K/uL    NRBC (Absolute) 0.00 K/uL   Basic Metabolic Panel    Collection Time: 07/20/19  5:10 AM   Result Value Ref Range    Sodium 134 (L) 135 - 145 mmol/L    Potassium 3.5 (L) 3.6 - 5.5 mmol/L    Chloride 103 96 - 112 mmol/L    Co2 20 20 - 33 mmol/L    Glucose 126 (H) 65 - 99 mg/dL    Bun 11 8 - 22 mg/dL    Creatinine 0.52 0.50 - 1.40 mg/dL    Calcium 8.7 8.5 - 10.5 mg/dL    Anion Gap 11.0 0.0 - 11.9   ESTIMATED GFR    Collection Time: 07/20/19  5:10 AM   Result Value Ref Range    GFR If African American >60 >60 mL/min/1.73 m 2    GFR If Non African American >60 >60 mL/min/1.73 m 2       Fluids    Intake/Output Summary (Last 24 hours) at 07/20/19 1210  Last data filed at 07/20/19 0600   Gross per 24 hour   Intake             2000 ml   Output             1200 ml   Net              800 ml        Core Measures & Quality Metrics  Labs reviewed, Medications reviewed and Radiology images reviewed  Ross catheter: No Ross      DVT Prophylaxis: Enoxaparin (Lovenox) (7/19 initated, cleared by neurosurgery)  DVT prophylaxis - mechanical: SCDs  Ulcer prophylaxis: Not indicated    Assessed for rehab: Patient returned to prior level of function, rehabilitation not indicated at this time    Total Score: 6    ETOH Screening  CAGE Score: 1  Assessment complete date: 7/12/2019  Intervention: yes. Patient response to intervention: Drinks three glasses of vodka daily, for pour counts to 10, or beer for the past few years. Denies tobacco, marijuana, illicit drugs or prescription drug abuse. Per family, pt has large quantities of valium, hydrocodone and an unidentified bag of [place]. Family states she has also be to rehab 3 times for alcohol..   Patient does not demonstrate understanding of intervention. Patient agrees to follow-up.   has been contacted. Follow up with: PCP, [facility]  Total ETOH intervention time: greater than 30 minutes      Assessment/Plan  Benzodiazepine withdrawal with delirium (HCC)   Assessment & Plan    Patient takes unknown quantity of benzodiazepines she obtains from [facility]  7/12  Acute withdrawal to ICU with phenobarb prootcol  7/13  Changed to ativan as phenobarb ineffective   7/14  Periods of decreased responsiveness - repeat CT head OK  7/17  Increase scheduled ativan and prn  7/19 well maintained on scheduled ativan  7/20 no ativan for over 24 hours, stopped     Contraindication to deep vein thrombosis (DVT) prophylaxis- (present on admission)   Assessment & Plan    Initial systemic anticoagulation contraindicated secondary to elevated bleeding risk.  RAP score 3.  7/10 Surveillance venous duplex scanning negative for above knee DVT.  7/19 Lovenox commenced.  Ambulate TID when able     Traumatic hemorrhage of right cerebrum without loss  of consciousness (HCC)- (present on admission)   Assessment & Plan    Traumatic hemorrhage of right cerebrum.  Repeat head CT completed.  Additional head CT stable.  Non-operative management.   Post traumatic pharmacologic seizure prophylaxis for 1 week complete.  7/19 Cleared for [email or web address]  Speech Language Pathology cognitive evaluation.  Kashmir Parra MD. Neurosurgery     Alcohol abuse- (present on admission)   Assessment & Plan    Blood alcohol levelat [facility] 0.25.  7/11 Brief intervention completed.   - Withdrawal symptoms noted / CIWA, rally bag and multivitamins and Librium initiated.   7/12  Brief intervention completed again. Pt more forthcoming this assessment and family at bedside.  7/12  Acute withdrawal to ICU with phenobarb prootcol  7/13  Changed to ativan as phenobarb ineffective   7/14  Periods of decreased responsiveness - repeat CT head Ok.  7/20 awake alert     Left shoulder pain- (present on admission)   Assessment & Plan    History of chronic left shoulder pain with decreased ROM.  Persistent pain.  7/11 Dedicated films negative for acute injury.     Hypertension- (present on admission)   Assessment & Plan    Chronic condition treated with Lisinopril.  Resume maintenance medication.     Hypothyroidism- (present on admission)   Assessment & Plan    Chronic condition treated with Levothyroxine.  Resume maintenance medication.     UTI (urinary tract infection)- (present on admission)   Assessment & Plan    Admission UA positive.  Antibiotics completed.     Trauma- (present on admission)   Assessment & Plan    Ground level fall. Intoxicated fell in bathroom. Also has a UTI.  T-5000 Activation. Transferred from [facility].  Hazel Cano MD. Trauma Surgery.         Discussed patient condition with RN, Patient and trauma surgery. Dr. Moreland

## 2019-07-20 NOTE — PROGRESS NOTES
Trauma / Surgical Daily Progress Note    Date of Service  7/19/2019    Interval Events  No prn Ativan needed overnight  Up in chair and bale to feed self for lunch  Continue scheduled Ativan at present dose and frequency    Daughter updated extensively at bedside during rounds to her apparent satisfaction.    Review of Systems  Review of Systems     Vital Signs for last 24 hours  Temp:  [36.7 °C (98 °F)-37.8 °C (100 °F)] 36.7 °C (98 °F)  Pulse:  [] 92  Resp:  [14-55] 16  SpO2:  [88 %-98 %] 96 %    Hemodynamic parameters for last 24 hours       Respiratory Data     Respiration: 16, Pulse Oximetry: 96 %        RUL Breath Sounds: Clear, RML Breath Sounds: Clear, RLL Breath Sounds: Diminished, CAIN Breath Sounds: Clear, LLL Breath Sounds: Diminished    Physical Exam  Physical Exam   Constitutional: She appears well-developed and well-nourished. No distress. Nasal cannula in place.   HENT:   Head: Normocephalic and atraumatic.   Eyes: Pupils are equal, round, and reactive to light. Conjunctivae and EOM are normal.   Neck: Normal range of motion. Neck supple. No JVD present. No tracheal deviation present. No thyromegaly present.   Cardiovascular: Normal rate, regular rhythm and intact distal pulses.    Pulmonary/Chest: Effort normal and breath sounds normal. No respiratory distress. She exhibits no tenderness.   Abdominal: Soft. Bowel sounds are normal. She exhibits no distension. There is no tenderness. There is no guarding.   Musculoskeletal: Tenderness: left shoulder.   Lymphadenopathy:     She has no cervical adenopathy.   Neurological: She is alert. No cranial nerve deficit or sensory deficit. GCS eye subscore is 4. GCS verbal subscore is 5. GCS motor subscore is 6.   Non focal / RUVALCABA   Skin: Skin is warm and dry.   Nursing note and vitals reviewed.      Laboratory  Recent Results (from the past 24 hour(s))   CBC WITH DIFFERENTIAL    Collection Time: 07/19/19  4:05 AM   Result Value Ref Range    WBC 6.7 4.8 -  10.8 K/uL    RBC 3.16 (L) 4.20 - 5.40 M/uL    Hemoglobin 11.3 (L) 12.0 - 16.0 g/dL    Hematocrit 34.4 (L) 37.0 - 47.0 %    .9 (H) 81.4 - 97.8 fL    MCH 35.8 (H) 27.0 - 33.0 pg    MCHC 32.8 (L) 33.6 - 35.0 g/dL    RDW 47.6 35.9 - 50.0 fL    Platelet Count 314 164 - 446 K/uL    MPV 10.2 9.0 - 12.9 fL    Neutrophils-Polys 58.00 44.00 - 72.00 %    Lymphocytes 21.40 (L) 22.00 - 41.00 %    Monocytes 15.70 (H) 0.00 - 13.40 %    Eosinophils 3.40 0.00 - 6.90 %    Basophils 1.20 0.00 - 1.80 %    Immature Granulocytes 0.30 0.00 - 0.90 %    Nucleated RBC 0.00 /100 WBC    Neutrophils (Absolute) 3.86 2.00 - 7.15 K/uL    Lymphs (Absolute) 1.43 1.00 - 4.80 K/uL    Monos (Absolute) 1.05 (H) 0.00 - 0.85 K/uL    Eos (Absolute) 0.23 0.00 - 0.51 K/uL    Baso (Absolute) 0.08 0.00 - 0.12 K/uL    Immature Granulocytes (abs) 0.02 0.00 - 0.11 K/uL    NRBC (Absolute) 0.00 K/uL   MAGNESIUM    Collection Time: 07/19/19  4:05 AM   Result Value Ref Range    Magnesium 1.6 1.5 - 2.5 mg/dL   Basic Metabolic Panel    Collection Time: 07/19/19  4:05 AM   Result Value Ref Range    Sodium 132 (L) 135 - 145 mmol/L    Potassium 3.6 3.6 - 5.5 mmol/L    Chloride 102 96 - 112 mmol/L    Co2 21 20 - 33 mmol/L    Glucose 108 (H) 65 - 99 mg/dL    Bun 15 8 - 22 mg/dL    Creatinine 0.42 (L) 0.50 - 1.40 mg/dL    Calcium 8.4 (L) 8.5 - 10.5 mg/dL    Anion Gap 9.0 0.0 - 11.9   ESTIMATED GFR    Collection Time: 07/19/19  4:05 AM   Result Value Ref Range    GFR If African American >60 >60 mL/min/1.73 m 2    GFR If Non African American >60 >60 mL/min/1.73 m 2   TSH    Collection Time: 07/19/19  6:05 AM   Result Value Ref Range    TSH 2.820 0.380 - 5.330 uIU/mL       Fluids    Intake/Output Summary (Last 24 hours) at 07/19/19 1913  Last data filed at 07/19/19 1800   Gross per 24 hour   Intake           2892.5 ml   Output             2300 ml   Net            592.5 ml       Core Measures & Quality Metrics  Labs reviewed, Medications reviewed and Radiology images  reviewed  Ross catheter: No Ross      DVT Prophylaxis: Enoxaparin (Lovenox) (ICB)  DVT prophylaxis - mechanical: SCDs  Ulcer prophylaxis: Not indicated    Assessed for rehab: Patient returned to prior level of function, rehabilitation not indicated at this time    ADDY Score  ETOH Screening    Assessment/Plan  Benzodiazepine withdrawal with delirium (HCC)   Assessment & Plan    Patient takes unknown quantity of benzodiazepines she obtains from [facility]  7/12  Acute withdrawal to ICU with phenobarb prootcol  7/13  Changed to ativan as phenobarb ineffective   7/14  Periods of decreased responsiveness - repeat CT head OK  7/17  Increase scheduled ativan and prn  7/19 well maintained on scheduled ativan     Contraindication to deep vein thrombosis (DVT) prophylaxis- (present on admission)   Assessment & Plan    Initial systemic anticoagulation contraindicated secondary to elevated bleeding risk.  RAP score 3.  7/10 Surveillance venous duplex scanning negative for above knee DVT.  Ambulate TID when able     Traumatic hemorrhage of right cerebrum without loss of consciousness (HCC)- (present on admission)   Assessment & Plan    Traumatic hemorrhage of right cerebrum.  Repeat head CT completed.  Additional head CT stable.  Non-operative management.   Post traumatic pharmacologic seizure prophylaxis for 1 week complete.  Speech Language Pathology cognitive evaluation.  Kashmir Parra MD. Neurosurgery     Alcohol abuse- (present on admission)   Assessment & Plan    Blood alcohol levelat [facility] 0.25.  7/11 Brief intervention completed.   - Withdrawal symptoms noted / CIWA, rally bag and multivitamins and Librium initiated.   7/12  Brief intervention completed again. Pt more forthcoming this assessment and family at bedside.  7/12  Acute withdrawal to ICU with phenobarb prootcol  7/13  Changed to ativan as phenobarb ineffective   7/14  Periods of decreased responsiveness - repeat CT head Ok.     Left shoulder pain- (present  on admission)   Assessment & Plan    History of chronic left shoulder pain with decreased ROM.  Persistent pain.  7/11 Dedicated films negative for acute injury.     Hypertension- (present on admission)   Assessment & Plan    Chronic condition treated with Lisinopril.  Resume maintenance medication.     Hypothyroidism- (present on admission)   Assessment & Plan    Chronic condition treated with Levothyroxine.  Resume maintenance medication.     UTI (urinary tract infection)- (present on admission)   Assessment & Plan    Admission UA positive.  Antibiotics completed.     Trauma- (present on admission)   Assessment & Plan    Ground level fall. Intoxicated fell in bathroom. Also has a UTI.  T-5000 Activation. Transferred from [facility].  Hazel Cano MD. Trauma Surgery.       I independently reviewed pertinent clinical lab tests from the last 48 hours and ordered additional follow up clinical lab tests.  I independently reviewed pertinent radiographic images and the radiologist's reports from the last 48 hours and ordered additional follow up radiographic studies.  I reviewed the details of the available patient records and documentation by consulting physicians in [facility] up to today, summated the information, and utilized the information as warranted in today's medical decision making for this patient.  I personally evaluated the patient condition at bedside and discussed the daily plan(s) with available nursing staff, dieticians, social workers, pharmacists on rounds.    Aggregated care time spent evaluating, reviewing documentation, providing care, and managing this patient exclusive of procedures: 35 minutes    Ronny Moreland MD

## 2019-07-20 NOTE — CARE PLAN
Problem: Safety  Goal: Will remain free from falls    Intervention: Implement fall precautions  Fall precautions are in place. Call light within reach. Bed alarm and chair alarm activated. Bed in lowest position.       Problem: Psychosocial Needs:  Goal: Level of anxiety will decrease    Intervention: Identify and develop with patient strategies to cope with anxiety triggers  Identifying anxiety triggers and developing strategies to cope

## 2019-07-20 NOTE — CARE PLAN
Problem: Safety  Goal: Will remain free from injury  Bed alarm and EZ release lap belt utilized for patient safety     Problem: Psychosocial Needs:  Goal: Level of anxiety will decrease    Intervention: Identify and develop with patient strategies to cope with anxiety triggers  Patient with confusion X3 (event, place, time), patient agitated related confusion; non pharmacological interventions utilized (mobilization, ambulation)

## 2019-07-20 NOTE — PROGRESS NOTES
2 RN Head to Toe skin assessment completed w/ Narayan RN.  The following areas of concerns:     Elbow are red but intact and blanching  Scattered bruising on upper and lower extremities  Scabs noted on bilateral knees, calves, and scab on right ankle   *Redness and IAD noted on Bilat thighs   *Sacral region is intact and blanching. Small red/pink scab noted on left buttocks.     Appropriate interventions in place.

## 2019-07-21 LAB
ANION GAP SERPL CALC-SCNC: 10 MMOL/L (ref 0–11.9)
BASOPHILS # BLD AUTO: 1.2 % (ref 0–1.8)
BASOPHILS # BLD: 0.08 K/UL (ref 0–0.12)
BUN SERPL-MCNC: 10 MG/DL (ref 8–22)
CALCIUM SERPL-MCNC: 8.6 MG/DL (ref 8.5–10.5)
CHLORIDE SERPL-SCNC: 104 MMOL/L (ref 96–112)
CO2 SERPL-SCNC: 18 MMOL/L (ref 20–33)
CREAT SERPL-MCNC: 0.56 MG/DL (ref 0.5–1.4)
EOSINOPHIL # BLD AUTO: 0.25 K/UL (ref 0–0.51)
EOSINOPHIL NFR BLD: 3.6 % (ref 0–6.9)
ERYTHROCYTE [DISTWIDTH] IN BLOOD BY AUTOMATED COUNT: 47.8 FL (ref 35.9–50)
GLUCOSE SERPL-MCNC: 151 MG/DL (ref 65–99)
HCT VFR BLD AUTO: 37 % (ref 37–47)
HGB BLD-MCNC: 12.1 G/DL (ref 12–16)
IMM GRANULOCYTES # BLD AUTO: 0.04 K/UL (ref 0–0.11)
IMM GRANULOCYTES NFR BLD AUTO: 0.6 % (ref 0–0.9)
LYMPHOCYTES # BLD AUTO: 1.64 K/UL (ref 1–4.8)
LYMPHOCYTES NFR BLD: 23.9 % (ref 22–41)
MAGNESIUM SERPL-MCNC: 1.7 MG/DL (ref 1.5–2.5)
MCH RBC QN AUTO: 35.4 PG (ref 27–33)
MCHC RBC AUTO-ENTMCNC: 32.7 G/DL (ref 33.6–35)
MCV RBC AUTO: 108.2 FL (ref 81.4–97.8)
MONOCYTES # BLD AUTO: 0.82 K/UL (ref 0–0.85)
MONOCYTES NFR BLD AUTO: 11.9 % (ref 0–13.4)
NEUTROPHILS # BLD AUTO: 4.04 K/UL (ref 2–7.15)
NEUTROPHILS NFR BLD: 58.8 % (ref 44–72)
NRBC # BLD AUTO: 0 K/UL
NRBC BLD-RTO: 0 /100 WBC
PLATELET # BLD AUTO: 449 K/UL (ref 164–446)
PMV BLD AUTO: 10.3 FL (ref 9–12.9)
POTASSIUM SERPL-SCNC: 4 MMOL/L (ref 3.6–5.5)
RBC # BLD AUTO: 3.42 M/UL (ref 4.2–5.4)
SODIUM SERPL-SCNC: 132 MMOL/L (ref 135–145)
WBC # BLD AUTO: 6.9 K/UL (ref 4.8–10.8)

## 2019-07-21 PROCEDURE — 85025 COMPLETE CBC W/AUTO DIFF WBC: CPT

## 2019-07-21 PROCEDURE — A9270 NON-COVERED ITEM OR SERVICE: HCPCS | Performed by: SURGERY

## 2019-07-21 PROCEDURE — 700111 HCHG RX REV CODE 636 W/ 250 OVERRIDE (IP): Performed by: SURGERY

## 2019-07-21 PROCEDURE — 36415 COLL VENOUS BLD VENIPUNCTURE: CPT

## 2019-07-21 PROCEDURE — 83735 ASSAY OF MAGNESIUM: CPT

## 2019-07-21 PROCEDURE — 92523 SPEECH SOUND LANG COMPREHEN: CPT

## 2019-07-21 PROCEDURE — 80048 BASIC METABOLIC PNL TOTAL CA: CPT

## 2019-07-21 PROCEDURE — 770001 HCHG ROOM/CARE - MED/SURG/GYN PRIV*

## 2019-07-21 PROCEDURE — 700112 HCHG RX REV CODE 229: Performed by: SURGERY

## 2019-07-21 PROCEDURE — 700102 HCHG RX REV CODE 250 W/ 637 OVERRIDE(OP): Performed by: SURGERY

## 2019-07-21 RX ADMIN — DOCUSATE SODIUM 100 MG: 100 CAPSULE, LIQUID FILLED ORAL at 06:37

## 2019-07-21 RX ADMIN — ENOXAPARIN SODIUM 30 MG: 100 INJECTION SUBCUTANEOUS at 06:38

## 2019-07-21 RX ADMIN — ENOXAPARIN SODIUM 30 MG: 100 INJECTION SUBCUTANEOUS at 17:26

## 2019-07-21 RX ADMIN — SODIUM CHLORIDE TAB 1 GM 2 G: 1 TAB at 08:22

## 2019-07-21 RX ADMIN — SENNOSIDES, DOCUSATE SODIUM 1 TABLET: 50; 8.6 TABLET, FILM COATED ORAL at 21:15

## 2019-07-21 RX ADMIN — LISINOPRIL 40 MG: 20 TABLET ORAL at 06:37

## 2019-07-21 RX ADMIN — LEVOTHYROXINE SODIUM 75 MCG: 75 TABLET ORAL at 06:37

## 2019-07-21 RX ADMIN — ACETAMINOPHEN 650 MG: 325 TABLET, FILM COATED ORAL at 08:44

## 2019-07-21 RX ADMIN — SODIUM CHLORIDE TAB 1 GM 2 G: 1 TAB at 12:45

## 2019-07-21 RX ADMIN — SODIUM CHLORIDE TAB 1 GM 2 G: 1 TAB at 17:26

## 2019-07-21 NOTE — PROGRESS NOTES
2 RN Head to Toe skin assessment completed w/ Shantal LARES.  The following areas of concerns:      Elbow are red but intact and blanching  Scattered bruising on upper and lower extremities  Scabs noted on bilateral knees, calves, and scab on right ankle   *Redness and IAD noted on Bilat thighs   *Sacral region is intact and blanching. Small red/pink scab noted on left buttocks.      Appropriate interventions in place.

## 2019-07-21 NOTE — PROGRESS NOTES
Trauma / Surgical Daily Progress Note    Date of Service  7/21/2019    Interval Events  ICU transfer  All Ativan stopped overnight, no issues  Alert and oriented x4 this morning  Working on Rehab placement  Hopefully the patient will be amenable    Family stating that the patient can't make her own medical decisions.  This patient would very likely pass a capacity exam based on her level of mental function during my evaluation this morning.  Additionally, she has been off Benzos, making this less of a complicating issue in regards to evaluating her mental capacity.  It has been explained to the family that we cannot impose decisions, staff, and placement on the patient unless she truly lacks capacity.  We are diligently working with the family and patient to provide the best disposition and care.  The patient's daughter explicitly voiced understanding of all of this and is in agreement.      Review of Systems  ROS     Vital Signs  Temp:  [36.9 °C (98.4 °F)-37.3 °C (99.1 °F)] 37 °C (98.6 °F)  Pulse:  [] 97  Resp:  [16-20] 17  BP: (129-146)/(72-95) 138/94  SpO2:  [92 %-97 %] 94 %    Physical Exam  Physical Exam   Constitutional: She appears well-developed and well-nourished. No distress. Nasal cannula in place.   HENT:   Head: Normocephalic and atraumatic.   Eyes: Pupils are equal, round, and reactive to light. Conjunctivae and EOM are normal.   Neck: Normal range of motion. Neck supple. No JVD present. No tracheal deviation present. No thyromegaly present.   Cardiovascular: Normal rate, regular rhythm and intact distal pulses.    Pulmonary/Chest: Effort normal and breath sounds normal. No respiratory distress. She exhibits no tenderness.   Abdominal: Soft. Bowel sounds are normal. She exhibits no distension. There is no tenderness. There is no guarding.   Musculoskeletal: Tenderness: left shoulder.   Lymphadenopathy:     She has no cervical adenopathy.   Neurological: She is alert. No cranial nerve deficit or  sensory deficit. GCS eye subscore is 4. GCS verbal subscore is 5. GCS motor subscore is 6.   Non focal / RUVALCABA   Skin: Skin is warm and dry.   Nursing note and vitals reviewed.      Laboratory  Recent Results (from the past 24 hour(s))   CBC WITH DIFFERENTIAL    Collection Time: 07/21/19  5:27 AM   Result Value Ref Range    WBC 6.9 4.8 - 10.8 K/uL    RBC 3.42 (L) 4.20 - 5.40 M/uL    Hemoglobin 12.1 12.0 - 16.0 g/dL    Hematocrit 37.0 37.0 - 47.0 %    .2 (H) 81.4 - 97.8 fL    MCH 35.4 (H) 27.0 - 33.0 pg    MCHC 32.7 (L) 33.6 - 35.0 g/dL    RDW 47.8 35.9 - 50.0 fL    Platelet Count 449 (H) 164 - 446 K/uL    MPV 10.3 9.0 - 12.9 fL    Neutrophils-Polys 58.80 44.00 - 72.00 %    Lymphocytes 23.90 22.00 - 41.00 %    Monocytes 11.90 0.00 - 13.40 %    Eosinophils 3.60 0.00 - 6.90 %    Basophils 1.20 0.00 - 1.80 %    Immature Granulocytes 0.60 0.00 - 0.90 %    Nucleated RBC 0.00 /100 WBC    Neutrophils (Absolute) 4.04 2.00 - 7.15 K/uL    Lymphs (Absolute) 1.64 1.00 - 4.80 K/uL    Monos (Absolute) 0.82 0.00 - 0.85 K/uL    Eos (Absolute) 0.25 0.00 - 0.51 K/uL    Baso (Absolute) 0.08 0.00 - 0.12 K/uL    Immature Granulocytes (abs) 0.04 0.00 - 0.11 K/uL    NRBC (Absolute) 0.00 K/uL   Basic Metabolic Panel    Collection Time: 07/21/19  5:27 AM   Result Value Ref Range    Sodium 132 (L) 135 - 145 mmol/L    Potassium 4.0 3.6 - 5.5 mmol/L    Chloride 104 96 - 112 mmol/L    Co2 18 (L) 20 - 33 mmol/L    Glucose 151 (H) 65 - 99 mg/dL    Bun 10 8 - 22 mg/dL    Creatinine 0.56 0.50 - 1.40 mg/dL    Calcium 8.6 8.5 - 10.5 mg/dL    Anion Gap 10.0 0.0 - 11.9   MAGNESIUM    Collection Time: 07/21/19  5:27 AM   Result Value Ref Range    Magnesium 1.7 1.5 - 2.5 mg/dL   ESTIMATED GFR    Collection Time: 07/21/19  5:27 AM   Result Value Ref Range    GFR If African American >60 >60 mL/min/1.73 m 2    GFR If Non African American >60 >60 mL/min/1.73 m 2       Fluids    Intake/Output Summary (Last 24 hours) at 07/21/19 1042  Last data filed at  07/21/19 0900   Gross per 24 hour   Intake              400 ml   Output              400 ml   Net                0 ml       Core Measures & Quality Metrics  Labs reviewed, Medications reviewed and Radiology images reviewed  Ross catheter: No Ross      DVT Prophylaxis: Enoxaparin (Lovenox) (ICB)  DVT prophylaxis - mechanical: SCDs  Ulcer prophylaxis: Not indicated    Assessed for rehab: Patient returned to prior level of function, rehabilitation not indicated at this time    ADDY Score  ETOH Screening    Assessment/Plan  Benzodiazepine withdrawal with delirium (HCC)   Assessment & Plan    Patient takes unknown quantity of benzodiazepines she obtains from [facility]  7/12  Acute withdrawal to ICU with phenobarb prootcol  7/13  Changed to ativan as phenobarb ineffective   7/14  Periods of decreased responsiveness - repeat CT head OK  7/17  Increase scheduled ativan and prn  7/19 well maintained on scheduled ativan  7/20 no ativan for over 24 hours, stopped     Contraindication to deep vein thrombosis (DVT) prophylaxis- (present on admission)   Assessment & Plan    Initial systemic anticoagulation contraindicated secondary to elevated bleeding risk.  RAP score 3.  7/10 Surveillance venous duplex scanning negative for above knee DVT.  7/19 Lovenox commenced.  Ambulate TID when able     Traumatic hemorrhage of right cerebrum without loss of consciousness (HCC)- (present on admission)   Assessment & Plan    Traumatic hemorrhage of right cerebrum.  Repeat head CT completed.  Additional head CT stable.  Non-operative management.   Post traumatic pharmacologic seizure prophylaxis for 1 week complete.  7/19 Cleared for Lovenox  Speech Language Pathology cognitive evaluation.  Kashmir Parra MD. Neurosurgery     Alcohol abuse- (present on admission)   Assessment & Plan    Blood alcohol levelat [facility] 0.25.  7/11 Brief intervention completed.   - Withdrawal symptoms noted / CIWA, rally bag and multivitamins and Librium  initiated.   7/12  Brief intervention completed again. Pt more forthcoming this assessment and family at bedside.  7/12  Acute withdrawal to ICU with phenobarb prootcol  7/13  Changed to ativan as phenobarb ineffective   7/14  Periods of decreased responsiveness - repeat CT head Ok.  7/20 awake alert     Left shoulder pain- (present on admission)   Assessment & Plan    History of chronic left shoulder pain with decreased ROM.  Persistent pain.  7/11 Dedicated films negative for acute injury.     Hypertension- (present on admission)   Assessment & Plan    Chronic condition treated with Lisinopril.  Resume maintenance medication.     Hypothyroidism- (present on admission)   Assessment & Plan    Chronic condition treated with Levothyroxine.  Resume maintenance medication.     UTI (urinary tract infection)- (present on admission)   Assessment & Plan    Admission UA positive.  Antibiotics completed.     Trauma- (present on admission)   Assessment & Plan    Ground level fall. Intoxicated fell in bathroom. Also has a UTI.  T-5000 Activation. Transferred from [facility].  Hazel Cano MD. Trauma Surgery.       Ronny Moreland MD

## 2019-07-21 NOTE — PROGRESS NOTES
Arrived to unit around accompanied by ARNAUD Fallon.  Patient oriented only to self at this time; otherwise, no changes from ICU assessment.  Skin assessment completed.  Bed alarm and seizure precautions in place.

## 2019-07-21 NOTE — CARE PLAN
Problem: Safety  Goal: Will remain free from injury  Outcome: PROGRESSING AS EXPECTED    Intervention: Provide assistance with mobility  Generalized weakness, 1-2 assist, fall precaution in place,seizure precaution in place, call light within reach.      Problem: Knowledge Deficit  Goal: Knowledge of disease process/condition, treatment plan, diagnostic tests, and medications will improve  Outcome: PROGRESSING AS EXPECTED    Intervention: Explain information regarding disease process/condition, treatment plan, diagnostic tests, and medications and document in education  Updated with plan of care, encourages to ask questions and concerns.

## 2019-07-21 NOTE — THERAPY
"Speech Language Therapy Evaluation completed to address cognition  Functional Status:  Cognitive-Linguistic evaluation completed using both standardized (Cognistat) and non standardized testing. She currently presents with mild to moderate deficits in orientation, long term memory, complex information processing, reasoning and problem solving as well as complex verbal sequencing. 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 She would benefit from continued speech therapy.  Recommendations:  Patient needs daily supervision at place of discharge  Plan of Care: Will benefit from Speech Therapy 3 times per week  Post-Acute Therapy: Recommend inpatient transitional care services for continued speech therapy services.        See \"Rehab Therapy-Acute\" Patient Summary Report for complete documentation.   "

## 2019-07-22 VITALS
OXYGEN SATURATION: 96 % | HEART RATE: 102 BPM | SYSTOLIC BLOOD PRESSURE: 154 MMHG | TEMPERATURE: 98.6 F | DIASTOLIC BLOOD PRESSURE: 96 MMHG | WEIGHT: 215.83 LBS | BODY MASS INDEX: 34.69 KG/M2 | HEIGHT: 66 IN | RESPIRATION RATE: 18 BRPM

## 2019-07-22 PROBLEM — Z78.9 NO CONTRAINDICATION TO DEEP VEIN THROMBOSIS (DVT) PROPHYLAXIS: Status: ACTIVE | Noted: 2019-07-09

## 2019-07-22 PROCEDURE — 700111 HCHG RX REV CODE 636 W/ 250 OVERRIDE (IP): Performed by: SURGERY

## 2019-07-22 PROCEDURE — 700102 HCHG RX REV CODE 250 W/ 637 OVERRIDE(OP): Performed by: NURSE PRACTITIONER

## 2019-07-22 PROCEDURE — A9270 NON-COVERED ITEM OR SERVICE: HCPCS | Performed by: NURSE PRACTITIONER

## 2019-07-22 PROCEDURE — A9270 NON-COVERED ITEM OR SERVICE: HCPCS | Performed by: SURGERY

## 2019-07-22 PROCEDURE — 700102 HCHG RX REV CODE 250 W/ 637 OVERRIDE(OP): Performed by: SURGERY

## 2019-07-22 RX ORDER — SODIUM CHLORIDE 1 G/1
2 TABLET ORAL
Qty: 90 TAB | Refills: 3 | Status: ON HOLD
Start: 2019-07-22 | End: 2021-01-01

## 2019-07-22 RX ORDER — POLYETHYLENE GLYCOL 3350 17 G/17G
17 POWDER, FOR SOLUTION ORAL 2 TIMES DAILY
Refills: 3 | Status: ON HOLD
Start: 2019-07-22 | End: 2021-01-01

## 2019-07-22 RX ORDER — HALOPERIDOL 5 MG/ML
2-5 INJECTION INTRAMUSCULAR EVERY 4 HOURS PRN
Refills: 0 | Status: ON HOLD
Start: 2019-07-22 | End: 2021-01-01

## 2019-07-22 RX ORDER — FLUDROCORTISONE ACETATE 0.1 MG/1
0.1 TABLET ORAL EVERY MORNING
Qty: 30 TAB | Status: ON HOLD
Start: 2019-07-23 | End: 2021-01-01

## 2019-07-22 RX ORDER — LISINOPRIL 40 MG/1
40 TABLET ORAL DAILY
Qty: 30 TAB | Status: ON HOLD
Start: 2019-07-23 | End: 2021-01-01

## 2019-07-22 RX ORDER — PSEUDOEPHEDRINE HCL 30 MG
100 TABLET ORAL 2 TIMES DAILY
Qty: 60 CAP | Status: ON HOLD
Start: 2019-07-22 | End: 2021-01-01

## 2019-07-22 RX ORDER — BISACODYL 10 MG
10 SUPPOSITORY, RECTAL RECTAL
Refills: 0 | Status: ON HOLD
Start: 2019-07-22 | End: 2021-01-01

## 2019-07-22 RX ORDER — AMOXICILLIN 250 MG
1 CAPSULE ORAL DAILY
Qty: 30 TAB | Refills: 0 | Status: ON HOLD
Start: 2019-07-22 | End: 2021-01-01

## 2019-07-22 RX ORDER — AMOXICILLIN 250 MG
1 CAPSULE ORAL
Qty: 30 TAB | Refills: 0 | Status: ON HOLD
Start: 2019-07-22 | End: 2021-01-01

## 2019-07-22 RX ORDER — ONDANSETRON 2 MG/ML
4 INJECTION INTRAMUSCULAR; INTRAVENOUS EVERY 4 HOURS PRN
Qty: 84 ML | Status: ON HOLD
Start: 2019-07-22 | End: 2021-01-01

## 2019-07-22 RX ORDER — ACETAMINOPHEN 325 MG/1
650 TABLET ORAL EVERY 6 HOURS PRN
Qty: 30 TAB | Refills: 0 | Status: ON HOLD
Start: 2019-07-22 | End: 2021-01-01

## 2019-07-22 RX ORDER — ENEMA 19; 7 G/133ML; G/133ML
1 ENEMA RECTAL
Status: ON HOLD
Start: 2019-07-22 | End: 2021-01-01

## 2019-07-22 RX ORDER — LEVOTHYROXINE SODIUM 0.07 MG/1
75 TABLET ORAL
Qty: 30 TAB | Status: ON HOLD
Start: 2019-07-23 | End: 2021-01-01

## 2019-07-22 RX ORDER — LABETALOL HYDROCHLORIDE 5 MG/ML
10 INJECTION, SOLUTION INTRAVENOUS EVERY 4 HOURS PRN
Refills: 0 | Status: ON HOLD
Start: 2019-07-22 | End: 2021-01-01

## 2019-07-22 RX ORDER — FLUDROCORTISONE ACETATE 0.1 MG/1
0.1 TABLET ORAL EVERY MORNING
Status: DISCONTINUED | OUTPATIENT
Start: 2019-07-22 | End: 2019-07-22 | Stop reason: HOSPADM

## 2019-07-22 RX ADMIN — LEVOTHYROXINE SODIUM 75 MCG: 75 TABLET ORAL at 06:16

## 2019-07-22 RX ADMIN — ACETAMINOPHEN 650 MG: 325 TABLET, FILM COATED ORAL at 10:28

## 2019-07-22 RX ADMIN — LISINOPRIL 40 MG: 20 TABLET ORAL at 06:16

## 2019-07-22 RX ADMIN — SODIUM CHLORIDE TAB 1 GM 2 G: 1 TAB at 11:57

## 2019-07-22 RX ADMIN — SODIUM CHLORIDE TAB 1 GM 2 G: 1 TAB at 08:10

## 2019-07-22 RX ADMIN — ENOXAPARIN SODIUM 30 MG: 100 INJECTION SUBCUTANEOUS at 06:16

## 2019-07-22 RX ADMIN — FLUDROCORTISONE ACETATE 0.1 MG: 0.1 TABLET ORAL at 11:57

## 2019-07-22 ASSESSMENT — ENCOUNTER SYMPTOMS
ABDOMINAL PAIN: 0
SHORTNESS OF BREATH: 0
CONSTIPATION: 1
BLURRED VISION: 0
BACK PAIN: 0
FOCAL WEAKNESS: 0
DIZZINESS: 0
NAUSEA: 0
FEVER: 0
DIAPHORESIS: 0
TREMORS: 0
SENSORY CHANGE: 0
SPEECH CHANGE: 0
MYALGIAS: 1
NECK PAIN: 0

## 2019-07-22 ASSESSMENT — LIFESTYLE VARIABLES: SUBSTANCE_ABUSE: 1

## 2019-07-22 NOTE — DISCHARGE PLANNING
Received Transport Form at 1400.  Spoke to Violet at [facility].   Transport is scheduled for 7/22 at 1600 going to [facility].    ARNAUD Bolanos notified.

## 2019-07-22 NOTE — PROGRESS NOTES
Trauma / Surgical Daily Progress Note    Date of Service  7/22/2019    Chief Complaint  68 y.o. female admitted 7/9/2019 with ICH (intracerebral hemorrhage) (HCC)    Interval Events  Transfer from ICU to neuro  No further signs of withdrawal, AOx4, at baseline per family  Adequate pain control  Acceptance to [facility], medically cleared to attend    Review of Systems  Review of Systems   Constitutional: Negative for diaphoresis and fever.   HENT: Negative for hearing loss.    Eyes: Negative for blurred vision.   Respiratory: Negative for shortness of breath.    Cardiovascular: Negative for chest pain.   Gastrointestinal: Positive for constipation. Negative for abdominal pain and nausea.        + BM 7/22   Genitourinary: Negative for dysuria (voiding).   Musculoskeletal: Positive for joint pain (left shoulder) and myalgias. Negative for back pain and neck pain.   Skin: Negative for rash.   Neurological: Negative for dizziness, tremors, sensory change, speech change and focal weakness.   Psychiatric/Behavioral: Positive for substance abuse.        Vital Signs  Temp:  [36.8 °C (98.2 °F)-37.3 °C (99.2 °F)] 37 °C (98.6 °F)  Pulse:  [] 102  Resp:  [16-18] 18  BP: (142-154)/(93-96) 154/96  SpO2:  [91 %-96 %] 96 %    Physical Exam  Physical Exam   Constitutional: She is oriented to person, place, and time. She appears well-developed. No distress. Nasal cannula in place.   HENT:   Head: Normocephalic.   Eyes: Pupils are equal, round, and reactive to light.   Neck: Normal range of motion. No JVD present. No tracheal deviation present. No thyromegaly present.   Cardiovascular: Normal rate and intact distal pulses.    Pulmonary/Chest: Effort normal. No respiratory distress. She exhibits no tenderness.   Abdominal: Soft. She exhibits no distension. There is no tenderness. There is no guarding.   Musculoskeletal: She exhibits tenderness (left shoulder). She exhibits no edema.   Lymphadenopathy:     She has no cervical  adenopathy.   Neurological: She is alert and oriented to person, place, and time. No cranial nerve deficit or sensory deficit. GCS eye subscore is 4. GCS verbal subscore is 5. GCS motor subscore is 6.   Moves all extremities    Skin: Skin is warm and dry.   Psychiatric: She has a normal mood and affect.   Nursing note and vitals reviewed.      Laboratory  No results found for this or any previous visit (from the past 24 hour(s)).    Fluids    Intake/Output Summary (Last 24 hours) at 07/22/19 1242  Last data filed at 07/22/19 0800   Gross per 24 hour   Intake              360 ml   Output                0 ml   Net              360 ml       Core Measures & Quality Metrics  Labs reviewed, Medications reviewed and Radiology images reviewed  Ross catheter: No Ross      DVT Prophylaxis: Enoxaparin (Lovenox)  DVT prophylaxis - mechanical: SCDs  Ulcer prophylaxis: Not indicated    Assessed for rehab: Patient returned to prior level of function, rehabilitation not indicated at this time    Total Score: 6    ETOH Screening  CAGE Score: 1  Assessment complete date: 7/12/2019  Intervention: yes. Patient response to intervention: Drinks three glasses of vodka daily, for pour counts to 10, or beer for the past few years. Denies tobacco, marijuana, illicit drugs or prescription drug abuse. Per family, pt has large quantities of valium, hydrocodone and an unidentified bag of [place]. Family states she has also be to rehab 3 times for alcohol..   Patient does not demonstrate understanding of intervention. Patient agrees to follow-up.   has been contacted. Follow up with: [facility]  Total ETOH intervention time: greater than 30 minutes      Assessment/Plan  Benzodiazepine withdrawal with delirium (HCC)   Assessment & Plan    Patient takes unknown quantity of benzodiazepines she obtains from [facility]  7/12  Acute withdrawal to ICU with phenobarb prootcol  7/13  Changed to ativan  as phenobarb ineffective   7/14  Periods of decreased responsiveness - repeat CT head OK  7/17  Increase scheduled ativan and prn  7/19 Well maintained on scheduled ativan  7/20 Ativan stopped     Traumatic hemorrhage of right cerebrum without loss of consciousness (HCC)- (present on admission)   Assessment & Plan    Traumatic hemorrhage of right cerebrum.  Repeat head CT completed.  Additional head CT stable.  Non-operative management.   Post traumatic pharmacologic seizure prophylaxis for 1 week complete.  Speech Language Pathology cognitive evaluation.   Kashmir Parra MD. Neurosurgery     Alcohol abuse- (present on admission)   Assessment & Plan    Blood alcohol levelat [facility] 0.25.  7/11 Brief intervention completed.   - Withdrawal symptoms noted / CIWA, rally bag and multivitamins and Librium initiated.   7/12  Brief intervention completed again. Pt more forthcoming this assessment and family at bedside.  7/12  Acute withdrawal to ICU with phenobarb prootcol  7/13  Changed to ativan as phenobarb ineffective   7/14  Periods of decreased responsiveness - repeat CT head Ok.  Neurologic exam at baseline     Left shoulder pain- (present on admission)   Assessment & Plan    History of chronic left shoulder pain with decreased ROM.  Persistent pain.  7/11 Dedicated films negative for acute injury.      Hypertension- (present on admission)   Assessment & Plan    Chronic condition treated with Lisinopril.  Resume maintenance medication.      Hypothyroidism- (present on admission)   Assessment & Plan    Chronic condition treated with Levothyroxine.  Resume maintenance medication.      No contraindication to deep vein thrombosis (DVT) prophylaxis- (present on admission)   Assessment & Plan    Initial systemic anticoagulation contraindicated secondary to elevated bleeding risk.  RAP score 3.  7/10 Surveillance venous duplex scanning negative for above knee DVT.  7/19 Lovenox commenced.  Ambulate TID when able        UTI (urinary tract infection)- (present on admission)   Assessment & Plan    Admission UA positive.  Antibiotics completed.      Trauma- (present on admission)   Assessment & Plan    Ground level fall. Intoxicated fell in bathroom. Also has a UTI.  T-5000 Activation. Transferred from [facility].  Hazel Cano MD. Trauma Surgery.          Discussed patient condition with RN, Patient and trauma surgery. Dr. Cano

## 2019-07-22 NOTE — DISCHARGE PLANNING
Anticipated Discharge Disposition:   SNF    Action:   Met with daughter, Brigida @ [phone]. She prefers [facility]. Per daughter, she has been in contact with Kiana at the facility. Per [facility], [facility] is accepting.   Talked to Kiana who requested for updated notes. Notified [facility]. Per Kiana, she will be here by 130pm to do an onsight.     Talked to Mery OCHOA who cleared pt for discharged.     Talked to liaison for [facility] who have accepted pt but daughter said she would like to wait for [facility] to accept first.     PASRR: [id number]    Barriers to Discharge:   Pending acceptance by [facility]    Plan:   Wait for Kiana at [facility] to see pt and give determination.

## 2019-07-22 NOTE — PROGRESS NOTES
Report given to ARNAUD JIMENEZ,[facility].patient made sign the consent, daughter on the phone consented the transfer, per CN no need to get the ID from the transport.

## 2019-07-22 NOTE — DISCHARGE INSTRUCTIONS
Call or seek medical attention for questions or concerns   - Follow up with Dr. Parra in 1-2 weeks time   - Follow up with Dr. Cano as needed   - Avoid all blood thinners including aspirin or NSAIDs (ibuprophen, Advil, Aleve, Motrin) for at least two weeks   - Follow up with primary care provider within one weeks time   - Resume regular diet   - May take over the counter acetaminophen as needed for pain   - Continue daily over the counter stool softener while on narcotics   - No operation of machinery or motorized vehicles while under the influence of narcotics   - No alcohol, marijuana or illicit drug use while under the influence of narcotics   - No swimming, hot tubs, baths or wound submersion until cleared by outpatient provider. May shower   - No contact sports, strenuous activities, or heavy lifting until cleared by outpatient provider   - If respiratory decompensation, uncontrolled pain, neurologic changes, or signs or symptoms of infection occur seek medical attention  Discharge Instructions    Discharged to other by medical transportation with escort. Discharged via wheelchair, hospital escort: Yes.  Special equipment needed: Not Applicable    Be sure to schedule a follow-up appointment with your primary care doctor or any specialists as instructed.     Discharge Plan:   Diet Plan: Discussed  Activity Level: Discussed  Confirmed Follow up Appointment: Patient to Call and Schedule Appointment  Confirmed Symptoms Management: Discussed  Medication Reconciliation Updated: Yes  Pneumococcal Vaccine Administered/Refused: Not given - Patient refused pneumococcal vaccine  Influenza Vaccine Indication: Patient Refuses    I understand that a diet low in cholesterol, fat, and sodium is recommended for good health. Unless I have been given specific instructions below for another diet, I accept this instruction as my diet prescription.   Other diet: regular    Special Instructions:   Intracranial Hemorrhage  An  intracranial hemorrhage is bleeding in the layers between the skull (cranium) and brain. A blood vessel bursts and allows blood to leak inside the cranial cavity. The leaking blood then collects (hematoma). This causes pressure and damage to brain cells. The bleeding can be mild to severe. In severe cases, it can lead to permanent damage or death. Symptoms may come on suddenly or develop over time. Early diagnosis and treatment leads to better recovery.  There are four types of intracranial hemorrhage: subarachnoid, subdural, extradural, or cerebral hemorrhage.  What are the causes?  · Head injury (trauma).  · Ruptured brain aneurysm.  · Bleeding from blood vessels that develop abnormally (arteriovenous malformation).  · Bleeding disorder.  · Use of blood thinners (anticoagulants).  · Use of certain drugs, such as cocaine.  For some people with intracranial hemorrhage, the cause is unknown.  What increases the risk?  · Using tobacco products, such as cigarettes and chewing tobacco.  · Having high blood pressure (hypertension).  · Abusing alcohol.  · Being a female, especially of postmenopausal age.  · Having a family history of disease in the blood vessels of the brain (cerebrovascular disease).  · Having certain genetic syndromes that result in kidney disease or connective tissue disease.  What are the signs or symptoms?  · A sudden, severe headache with no known cause. The headache is often described as the worst headache ever experienced.  · Nausea or vomiting, especially when combined with other symptoms such as a headache.  · Sudden weakness or numbness of the face, arm, or leg, especially on one side of the body.  · Sudden trouble walking or difficulty moving arms or legs.  · Sudden confusion.  · Sudden personality changes.  · Trouble speaking (aphasia) or understanding.  · Difficulty swallowing.  · Sudden trouble seeing in one or both eyes.  · Double vision.  · Dizziness.  · Loss of balance or  coordination.  · Intolerance to light.  · Stiff neck.  How is this diagnosed?  Your health care provider will perform a physical exam and ask about your symptoms. If an intracranial hemorrhage is suspected, various tests may be ordered. These tests may include:  · A CT scan.  · An MRI.  · A cerebral angiogram.  · A spinal tap (lumbar puncture).  · Blood tests.  How is this treated?  Immediate treatment in the hospital is often required to reduce the risk of brain damage. Treatment will depend on the cause of the bleeding, where it is located, and the extent of the bleeding and damage. The goals of treatment include stopping the bleeding, repairing the cause of bleeding, providing relief of symptoms, and preventing problems.  · Medicines may be given to:  ¨ Lower blood pressure (antihypertensives).  ¨ Relieve pain (analgesics).  ¨ Relieve nausea or vomiting.  · Surgery may be needed to stop the bleeding, repair the cause of the bleeding, or remove the blood.  · Rehabilitation may be needed to improve any cognitive and day-to-day functions impaired by the condition.  Further treatment depends on the duration, severity, and cause of your symptoms. Physical, speech, and occupational therapists will assess you and work to improve any functions impaired by the intracranial hemorrhage. Measures will be taken to prevent short-term and long-term problems, including infection from breathing foreign material into the lungs (aspiration pneumonia), blood clots in the legs, bedsores, and falls.  Follow these instructions at home:  · Take medicines only as directed by your health care provider.  · Eat healthy foods as directed by your health care provider:  ¨ A diet low in salt (sodium), saturated fat, trans fat, and cholesterol may be recommended to manage your blood pressure.  ¨ Foods may need to be soft or pureed, or small bites may need to be taken in order to avoid aspirating or choking.  ¨ If studies show that your ability  to swallow safely has been affected, you may need to seek help from specialists such as a dietitian, speech and language pathologist, or an occupational therapist. These health care providers can teach you how to safely get the nutrition your body needs.  · Rest and limit activities or movements as directed by your health care provider.  · Do not use any tobacco products including cigarettes, chewing tobacco, or electronic cigarettes. If you need help quitting, ask your health care provider.  · Limit alcohol intake to no more than 1 drink per day for nonpregnant women and 2 drinks per day for men. One drink equals 12 ounces of beer, 5 ounces of wine, or 1½ ounces of hard liquor.  · Make any other lifestyle changes as directed by your health care provider.  · Monitor and record your blood pressure as directed by your health care provider.  · A safe home environment is important to reduce the risk of falls. Your health care provider may arrange for specialists to evaluate your home. Having grab bars in the bedroom and bathroom is often important. Your health care provider may arrange for special equipment to be used at home, such as raised toilets and a seat for the shower.  · Do physical, occupational, and speech therapy as directed by your health care provider. Ongoing therapy may be needed to maximize your recovery.  · Use a walker or a cane at all times if directed by your health care provider.  · Keep all follow-up visits with your health care provider and other specialists. This is important. This includes any referrals, physical therapy, and rehabilitation.  Get help right away if:  · You have a sudden, severe headache with no known cause.  · You have nausea or vomiting occurring with another symptom.  · You have sudden weakness or numbness of the face, arm, or leg, especially on one side of the body.  · You have sudden trouble walking or difficulty moving your arms or legs.  · You have sudden confusion.  · You  have trouble speaking (aphasia) or understanding.  · You have sudden trouble seeing in one or both eyes.  · You have a sudden loss of balance or coordination.  · You have a stiff neck.  · You have difficulty breathing.  · You have a partial or total loss of consciousness.  These symptoms may represent a serious problem that is an emergency. Do not wait to see if the symptoms will go away. Get medical help right away. Call your local emergency services (911 in the [place] Do not drive yourself to the hospital.   This information is not intended to replace advice given to you by your health care provider. Make sure you discuss any questions you have with your health care provider.  Document Released: 07/15/2015 Document Revised: 05/19/2017 Document Reviewed: 02/11/2015  [employer] Patient Education © 2017 [employer]      · Is patient discharged on Warfarin / Coumadin?   No     Depression / Suicide Risk    As you are discharged from this [facility] facility, it is important to learn how to keep safe from harming yourself.    Recognize the warning signs:  · Abrupt changes in personality, positive or negative- including increase in energy   · Giving away possessions  · Change in eating patterns- significant weight changes-  positive or negative  · Change in sleeping patterns- unable to sleep or sleeping all the time   · Unwillingness or inability to communicate  · Depression  · Unusual sadness, discouragement and loneliness  · Talk of wanting to die  · Neglect of personal appearance   · Rebelliousness- reckless behavior  · Withdrawal from people/activities they love  · Confusion- inability to concentrate     If you or a loved one observes any of these behaviors or has concerns about self-harm, here's what you can do:  · Talk about it- your feelings and reasons for harming yourself  · Remove any means that you might use to hurt yourself (examples: pills, rope, extension cords, firearm)  · Get professional  help from the community (Mental Health, Substance Abuse, psychological counseling)  · Do not be alone:Call your Safe Contact- someone whom you trust who will be there for you.  · Call your local CRISIS HOTLINE 350-8454 or 258-877-2377  · Call your local Children's Mobile Crisis Response Team [place] (709) 514-5510 or www.[employer]  · Call the toll free National Suicide Prevention Hotlines   · National Suicide Prevention Lifeline 034-505-IRDP (9167)  · National Hope Line Network 800-SUICIDE (128-3515)

## 2019-07-22 NOTE — PROGRESS NOTES
Pt's son Gary Colvin called to receive update. Informed him that over last two nights pt's mental status is improving, aeb pt using call light to go to bathroom and remembering my name.  Pt's son is requesting that SW/CM call him directly at [phone]. He has questions about psych eval for depression vs. alzheimer's vs. dementia, and status of his mom's discharge to SNF.

## 2019-07-22 NOTE — PROGRESS NOTES
Pt is A&Ox 2-3 (disoriented to place, time, situation)  Ambulated: y  Up with stby assist, steady gait.   Voiding: +  Last BM: 7/21 (1 day)  Pain: denies    Pt's mental status is improving. Although not fully oriented, she remembers my name tonight, and has used her call light to go to the bathroom, waited for me to put the bed alarm on hold, etc. She is picking up more on routines and saying fewer things that are inappropriate to the situation compared to last night.

## 2019-07-22 NOTE — DISCHARGE PLANNING
Anticipated Discharge Disposition:   [facility] has accepted     Action:   Confirmed from Kiana and [facility] that [facility] has accepted pt.   PC to daughter Brigida that she is consenting to dc.   IMM discussed with daughter Brigida and she wants pt to go to [facility]. Copy of IMM to pt.   Per [facility] transport is at 4pm. CN aware.   Transport form completed and faxed to [facility].     Barriers to Discharge:   none    Plan:   Dc today at 4pm

## 2019-07-22 NOTE — PROGRESS NOTES
RN MOBILITY NOTE     Surgery patient?: n  Date of surgery: na  Ambulated 50 ft on day of surgery? (N/A if today is not date of surgery): na   Number of times ambulated 50 feet or greater today: 3+  Patient has been up to chair, edge of bed or HOB 90 degrees for all meals?: y  Goal met? (goal is ambulating at least 50 feet 2 times on day shift, one time on night shift): y  If patient did not meet mobility goal, why?: na

## 2019-07-22 NOTE — DOCUMENTATION QUERY
[facility]                                                                       Query Response Note      PATIENT:               ZELALEM CASAS  ACCT #:                  [id number]  MRN:                     [id number]  :                      1950  ADMIT DATE:       2019 6:41 PM  DISCH DATE:          RESPONDING  PROVIDER #:        [id number]           QUERY TEXT:    Benzodiazepine withdrawal and a history of daily benzodiazepine use are documented in the Medical Record. Per coding guidelines, benzodiazepine withdrawal is categorized with benzodiazepine dependence. Only benzodiazepine use has been documented. Can you please clarify the pattern of this patient's benzodiazepine use?     NOTE:  If an appropriate response is not listed below, please respond with a new note.    The patient's Clinical Indicators include:  Per Progress Notes  -history of daily use of benzodiazapine   -takes unknown quantity of benzodiazepines she obtains from [facility]  -changed to ativan as phenobarb ineffective    Treatment:   Monitor in ICU & phenobarbital changed to Ativan     Risk Factors:   Withdrawal symptoms, daily benzodiazepine use, hx alcohol abuse, & multiple rehab stays  Options provided:   -- Benzodiazepine dependence with withdrawal   -- Benzodiazepine use only   -- Benzodiazepine abuse only   -- Unable to determine      Query created by: Lauren Landers on 2019 11:32 AM    RESPONSE TEXT:    Benzodiazepine dependence with withdrawal          Electronically signed by:  HIPOLITO ASHER 2019 12:05 PM

## 2019-07-22 NOTE — CARE PLAN
Problem: Safety  Goal: Will remain free from injury  Outcome: PROGRESSING AS EXPECTED    Intervention: Provide assistance with mobility  Encourage to call for any assistance needed, call light within reach.      Problem: Knowledge Deficit  Goal: Knowledge of disease process/condition, treatment plan, diagnostic tests, and medications will improve  Outcome: PROGRESSING AS EXPECTED    Intervention: Explain information regarding disease process/condition, treatment plan, diagnostic tests, and medications and document in education  Updated with plan of care, patient and daughter encourages to ask questions and concern.

## 2019-07-22 NOTE — PROGRESS NOTES
RN MOBILITY NOTE     Surgery patient?: NO  Date of surgery:   Ambulated 50 ft on day of surgery? (N/A if today is not date of surgery):   Number of times ambulated 50 feet or greater today: 4  Patient has been up to chair, edge of bed or HOB 90 degrees for all meals?: YES  Goal met? (goal is ambulating at least 50 feet 2 times on day shift, one time on night shift): YES  If patient did not meet mobility goal, why?: MET

## 2019-07-22 NOTE — DISCHARGE SUMMARY
Trauma Discharge Summary    DATE OF ADMISSION: 7/9/2019    DATE OF DISCHARGE: 7/22/2019    LENGTH OF STAY: 13 days    ATTENDING PHYSICIAN: Hazel Cano M.D.    CONSULTING PHYSICIAN:   1.  Kashmir zepeda MD, neurosurgery    DISCHARGE DIAGNOSIS:  1.  Traumatic hemorrhage of right cerebrum without loss of consciousness  2.  Alcohol abuse  3.  Benzodiazepine withdrawal with delirium  4.  Hypertension  5.  Hypothyroidism  6.  Left shoulder pain  7.  No contraindication to deep vein thrombosis prophylaxis  8.  Trauma  9.  Urinary tract infection    PROCEDURES:  None    HISTORY OF PRESENT ILLNESS: The patient is a 68 y.o. female involved in a ground-level fall striking her head.  She was initially evaluated at [facility] where she was found to have an intracranial hemorrhage.  She was subsequently transferred to [facility] for definite trauma care.  She was triaged as a Trauma in accordance with established pre-hospital protocols.    HOSPITAL COURSE: On arrival, outside imaging was reviewed and additional radiographic and laboratory studies and was admitted to the critical care team under the direction and supervision of Dr. Cano.  She sustained the listed injuries and incurred the listed diagnosis during her stay.    She was transferred from the emergency department to the trauma intensive care unit where a tertiary exam was performed.     She again was noted to have a traumatic hemorrhage of the right cerebrum.  Repeat head CT was completed and showed stable.  Additional head CT was performed and remained stable.  The patient was treated nonoperatively.  She received posttraumatic pharmacologic seizure prophylaxis for 1 week which was completed during her hospital stay.  She underwent a cognitive evaluation by speech therapy who recommend daily supervision upon discharge and inpatient transitional care for continued speech therapy needs.    The patient does have a history of chronic left  shoulder pain with decreased range of motion.  The patient did complain of ongoing persistent pain during her hospital stay.  Dedicated films were conducted on the left shoulder which was negative for acute injury.  The patient is to mobilize that left shoulder as tolerated.    The patient was noted to have a positive urinalysis on admission.  She received an antibiotic course during her stay.    Blood alcohol level at [facility] was noted to be 0.25.  The patient was also noted to take unknown quantities of benzodiazepines that she obtains from [facility].  On 7/11/2019 a brief intervention was completed.  Withdrawal symptoms were noted and the patient was initiated on CIWA protocol, received a rally bag and multivitamins in addition to Librium initiation.  On 7/12/2019 an additional brief intervention was completed with the patient being more forthcoming during this assessment and the patient's family present at the bedside.  Acute alcohol withdrawal increased and the patient was sent to the intensive care unit and placed on a phenobarbital protocol.  She was transition to Ativan on 7/13/2019 for phenobarbital ineffectiveness.  She was noted to have periods of decreased responsiveness on 7/14/2019 and a repeat head CT was completed and remained stable.  She required increased doses of Ativan on 7/17/2019.  Her neurologic exam continued to improve and Ativan was discontinued on 7/20/2019.    The patient was noted to have a significant history of hypertension and hypothyroidism, chronically treated with lisinopril and levothyroxine.  These medications were resumed during her hospital stay.    Initial systemic anticoagulation was contraindicated secondary to elevated bleeding risk.  The patient underwent a surveillance venous duplex scan on 7/10/2019 which was negative for above-the-knee DVT.  Lovenox was initiated on 7/19/2019.  And the patient was able to ambulate frequently.    She was medically stable for  [facility].  She was evaluated by physical therapy and Occupational Therapy who felt that the patient would benefit from transition to a postacute placement for additional skilled therapy needs prior to discharge home.  She was evaluated by multiple facilities and [facility]    On the day of discharge the patient's neurologic exam is at baseline with no noted deficits.  She is reporting adequate pain control.  She is tolerating regular diet, voiding, and having bowel movements as expected.  She is on room air with oxygen saturation greater than 92%.  She is eager and feeling well enough for transfer.    DISCHARGE PHYSICAL EXAM: See epic physical exam dated 7/22/2019    DISCHARGE MEDICATIONS:  I reviewed the patients controlled substance history and obtained a controlled substance use informed consent (if applicable) provided by [facility] and the patient has been prescribed.       Medication List      START taking these medications      Instructions   acetaminophen 325 MG Tabs  Commonly known as:  TYLENOL   Take 2 Tabs by mouth every 6 hours as needed.  Dose:  650 mg     bisacodyl 10 MG Supp  Commonly known as:  DULCOLAX   Insert 1 Suppository in rectum every 24 hours as needed (if magnesium hydroxide ineffective).  Dose:  10 mg     docusate sodium 100 MG Caps   Take 100 mg by mouth 2 Times a Day.  Dose:  100 mg     enoxaparin 30 MG/0.3ML Soln inj  Commonly known as:  LOVENOX   Inject 0.3 mL as instructed every 12 hours.  Dose:  30 mg     fleet 7-19 GM/118ML Enem   Insert 133 mL in rectum Once PRN (if bisacodyl ineffective).  Dose:  1 Each     fludrocortisone 0.1 MG Tabs  Start taking on:  7/23/2019  Commonly known as:  FLORINEF   Take 1 Tab by mouth every morning.  Dose:  0.1 mg     haloperidol lactate 5 MG/ML Soln  Commonly known as:  HALDOL   0.4-1 mL by Intravenous route every four hours as needed.  Dose:  2-5 mg     labetalol  5 MG/ML Soln  Commonly known as:  NORMODYNE,TRANDATE   2 mL by Intravenous route every four hours as needed (SBP greater than 160 mmHg, Do NOT administer if HR is less than 60 BPM).  Dose:  10 mg     magnesium hydroxide 400 MG/5ML Susp  Start taking on:  7/23/2019  Commonly known as:  MILK OF MAGNESIA   Take 30 mL by mouth every day.  Dose:  30 mL     ondansetron 4 MG/2ML Soln injection  Commonly known as:  ZOFRAN   2 mL by Intravenous route every four hours as needed for Nausea.  Dose:  4 mg     Pharmacy Consult Request   1 Each by Other route PHARMACY TO DOSE.  Dose:  1 Each     polyethylene glycol/lytes Pack  Commonly known as:  MIRALAX   Take 1 Packet by mouth 2 Times a Day.  Dose:  17 g     * senna-docusate 8.6-50 MG Tabs  Commonly known as:  PERICOLACE or SENOKOT S   Take 1 Tab by mouth every day.  Dose:  1 Tab     * senna-docusate 8.6-50 MG Tabs  Commonly known as:  PERICOLACE or SENOKOT S   Take 1 Tab by mouth every 24 hours as needed for Constipation.  Dose:  1 Tab     sodium chloride 1 GM Tabs  Commonly known as:  SALT   Take 2 Tabs by mouth 3 times a day, with meals.  Dose:  2 g        * This list has 2 medication(s) that are the same as other medications prescribed for you. Read the directions carefully, and ask your doctor or other care provider to review them with you.            CONTINUE taking these medications      Instructions   levothyroxine 75 MCG Tabs  Start taking on:  7/23/2019  Commonly known as:  SYNTHROID   Take 1 Tab by mouth Every morning on an empty stomach.  Dose:  75 mcg     lisinopril 40 MG tablet  Start taking on:  7/23/2019  Commonly known as:  PRINIVIL, ZESTRIL   Take 1 Tab by mouth every day.  Dose:  40 mg            DISPOSITION: The patient will be transferred to [facility] on 7/22/2019.  She will follow up with Dr. zepeda in 1 to 2 weeks.  She will follow-up with Dr. Esqueda as needed.  She will follow-up with her primary care provider as soon as possible upon discharge from  rehab.    The patient and family have been extensively counseled and all questions have been answered. Special attention was paid to respiratory decompensation, uncontrolled pain and signs and symptoms of infection and to seek immediate medical attention if these develop. The patient demonstrates understanding and gives verbal compliance with discharge instructions.    TIME SPENT ON DISCHARGE: 45 minutes      ____________________________________________  GENARO Pace    DD: 7/22/2019 12:45 PM

## 2019-10-17 NOTE — DIETARY
Patient:   LEONEL GUTIERREZ            MRN: [id number]            FIN: [id number]              Age:   30 years     Sex:  MALE     :  89   Associated Diagnoses:   None   Author:   MICHELLE, FRANCIA DODSON     Hospitalist Discharge Summary    DISCHARGE DIAGNOSIS: _                Impression and Plan   Assessment and Plan:       Course:    Assessment & Plan:.    30-year-old man with history of seizure disorder presents with several tonic-clonic seizures this afternoon.  Patient is chronically on Depakote but has been noncompliant due to side effects and recently discontinued approximately 3 weeks ago.  Had 3 episodes of tonic-clonic seizures beginning at 130 this afternoon, the subsequent ones were at 750 and 8:50 PM this evening.  Each seizure lasted between 2 to 5 minutes.  Last seizure resulted in a  postictal state with confusion and lethargy.         Acute seizure: Likely secondary to noncompliance with therapy.  Dr. Seo neurology evaluated started on Depakote  mg 2 tabs daily and seizure precautions.  EEG done results are pending.  Protonix omeprazole and Protonix Protonix.  Recommended no driving at least for 6 months without seizures and need to get a clearance from neurology .  RN called me and stated neurologist cleared for discharge and follow-up with Dr. Kerr in 1 week  MRI of the brain did not show any acute infarct or masses.  Patient recommended no driving      Bipolar disorder: Continue with lithium    History of schizophrenia disorder: On olanzapine.  Denies any hallucinations.    anxiety: Continue Ativan    Cannabis use: Patient recommended to complete cessation of cannabis and other illegal drugs if he uses.    History of vaping: Recommended to stop explained the risk to the patient understands and voiced.          Code status:       Full Code  Admit status change to inpatient: For recurrent seizures and neurology want to monitor.  Started back on Depakote per  Nutrition: followed up with pt to clarify peach allergy. Pt reports allergy to peach fuzz which causes a rash when it comes in contact with her skin when she is jared them. Ok to eat peaches without any allergy signs/symptoms. No anaphylactic reaction.     Discussed with RN   neurology.    Discussed current status and plan of care with patient and jovita, who verbalized understanding    Plan: MRI of the brain without contrast, EEG and started on Depakote ER to 250 mg daily per Dr. Kerr.  Discussed with RN and care manager.  Discussed with jovita at the bedside.  Seizure precautions.  Advised to stop cannabis and vaping explained the risk to the patient.       HOSPITAL COURSE: _  30-year-old male with Hx.  Came with the 3 episodes of seizure and noncompliance with his antiseizure medication said that he is supposed to be on Depakote but he stopped 3 weeks ago so neurologist evaluated Dr. Seo recommending Depakote ER small tablets thousand milligrams daily recommended to take regularly.  Patient is awake alert oriented.  Discussed with her girlfriend.  Neurologist Dr. Godinez cleared for discharge per ARMANDO Bajwa and  patient need to follow-up with Dr. Seo in 1 week.  Also recommended no driving.  Recommended to stop illegal drugs and vaping.  Vitals:   Vitals between:   16-OCT-2019 17:42:56   TO   17-OCT-2019 17:42:56                   LAST RESULT MINIMUM MAXIMUM  Temperature 36.7 36.4 36.7  Heart Rate 66 58 88  Respiratory Rate 16 16 20  NISBP           118 98 134  NIDBP           70 62 83  NIMBP           100 100 100  SpO2                    96 95 97  General:  Alert and oriented, No acute distress.    Eye:  Pupils are equal, round and reactive to light.    HENT:  Normocephalic, Oral mucosa is moist.         Ear: Pinna.         Nose: Both nostrils, Within normal limits.         Sinus: Within normal limits.         Mouth: Within normal limits.         Throat: Within normal limits.    Neck:  No carotid bruit, No jugular venous distention.    Respiratory:  Respirations are non-labored, Breath sounds are equal, Symmetrical chest wall expansion.   Cardiovascular:  Normal rate, Regular rhythm, Normal peripheral perfusion, No edema.   Gastrointestinal:  Soft, Non-tender, Normal bowel  sounds.    Genitourinary:  No costovertebral angle tenderness.    Musculoskeletal     Normal range of motion.     Normal strength.     No swelling.     Integumentary:  Warm.    Neurologic:  Alert, Oriented, Normal sensory, Normal motor function, No focal deficits, Cranial Nerves II-XII are grossly intact.   Psychiatric:  Cooperative.        Labs:   Labs between:  16-OCT-2019 17:42 to 17-OCT-2019 17:42    CBC:                 WBC  HgB  Hct  Plt  MCV  RDW   17-OCT-2019 10.6  14.1  40.5  167  90.6  12.5   16-OCT-2019 (H) 14.8  16.3  48.3  222  94.5  12.2     DIFF:                 Seg  Neutroph//ABS  Lymph//ABS  Mono//ABS  EOS/ABS  17-OCT-2019 NOT APPLICABLE  60 // 6.5 25 // 2.6 9 // (H) 1.0  5 // 0.5  16-OCT-2019 NOT APPLICABLE  66 // (H) 10.0  22 // 3.2 7 // (H) 1.0  3 // 0.4    BMP:                 Na  Cl  BUN  Glu   17-OCT-2019 142  (H) 114  10  90                              K  CO2  Cr  Ca                              3.5  21  1.06  8.4   BMP:                 Na  Cl  BUN  Glu   16-OCT-2019 139  107  10  (H) 125                              K  CO2  Cr  Ca                              3.5  (L) 12  1.12  8.9     CMP:                 AST  ALT  AlkPhos  Bili  Albumin   17-OCT-2019 21  18  61  0.4  3.7   16-OCT-2019 16  20  73  0.3  4.7     Other Chem:             Mg  Phos  Triglycerides  GGTP  DirectBili                           (H) 2.5                            Radiology results:    Result title:  MRI BRAIN WO CON  Result status:  Final  Verified by:  HARMAN RIDER on 10/17/2019 2:21  IMPRESSION:1.  No acute infarct, mass effect, or hydrocephalus.2.  Slight involutional changes for the patient's age can be seen in symptomatic and asymptomatic patients as an idiopathic phenomenon or related to dehydration or various medications/drugs.3.  No abnormal FLAIR signal abnormality.4.  Mild paranasal sinus disease.    Echocardiogram Results    No Results Have Been Found    PROCEDURE HISTORY:  EEG done results  pending    Pending results: _   EEG results pending.  DISCHARGE MEDICATION LIST   Allergies: Ceclor, Environmental, PCN (penicillins)     MEDICATION  DOSE  ROUTE  FREQUENCY  SPECIAL INSTRUCTIONS   acetaminophen (acetaminophen oral 325 mg tablet (Tylenol))  650 mg=2 tab  Oral  Every 6 hours As Needed: pain mild    ALPRAZolam (ALPRAZolam oral 0.25 mg tablet)  0.25 mg=1 tab  Oral  Twice daily As Needed: agitation    ARIPiprazole (ARIPiprazole lauroxil 662 mg/2.4 mL intramuscular suspension, extended release)  662 mg  IM  Every month (30 days)    divalproex sodium (Depakote ER oral 500 mg tablet)  1,000 mg=2 tab  Oral  DailyFor 30 days  **Prescription electronically sent to Pharmacy: [facility] #[id number]  docusate-senna (docusate-senna oral 50-8.6 mg tablet)  1 tab  Oral  Nightly at bedtime As Needed: constipation  **Prescription electronically sent to Pharmacy: [facility] #[id number]  gabapentin (Neurontin oral 400 mg capsule)  400 mg=1 cap  Oral  Three times daily    lithium (lithium carbonate oral 300 mg capsule)  300 mg=1 cap  Oral  Every 12 hours    OLANZapine (OLANZapine 20 mg oral tablet)  20 mg=1 tab  Oral  Nightly at bedtime    sertraline (sertraline oral 100 mg tablet)  100 mg=1 tab  Oral  Daily    traZODone (traZODone oral 100 mg tablet (Desyrel))  100 mg=1 tab  Oral  Nightly at bedtime           Primary Care Physician      Physician Name:    Specialty :    Unassigned.  Consulting Physicians     Physician Name:  NILDA LONGORIA Speciality:  NEUROLOGY Consult Reason:  seizure  Physician Name:  BRETT LEW Speciality:  NEUROLOGY Consult Reason:  seizure     Follow-up instructions:  If okay with neurologist patient can be discharged home and follow-up with his PCP and Dr. Kuhn in 1 week.  Recommended no driving at all and also advised to stop cannabis and vaping and also refuses any other illegal drugs.  Patient works for [employer] but he does not drive per patient.  Discussed with RN  Aydee.    I spent more than 35 minutes completing this patient's discharge.

## 2021-01-01 ENCOUNTER — HOSPITAL ENCOUNTER (OUTPATIENT)
Dept: RADIOLOGY | Facility: MEDICAL CENTER | Age: 71
End: 2021-01-21
Payer: MEDICARE

## 2021-01-01 ENCOUNTER — HOSPITAL ENCOUNTER (OUTPATIENT)
Dept: RADIOLOGY | Facility: MEDICAL CENTER | Age: 71
End: 2021-05-06
Payer: MEDICARE

## 2021-01-01 ENCOUNTER — APPOINTMENT (OUTPATIENT)
Dept: RADIOLOGY | Facility: MEDICAL CENTER | Age: 71
DRG: 083 | End: 2021-01-01
Attending: SURGERY
Payer: MEDICARE

## 2021-01-01 ENCOUNTER — APPOINTMENT (OUTPATIENT)
Dept: RADIOLOGY | Facility: MEDICAL CENTER | Age: 71
DRG: 640 | End: 2021-01-01
Attending: STUDENT IN AN ORGANIZED HEALTH CARE EDUCATION/TRAINING PROGRAM
Payer: MEDICARE

## 2021-01-01 ENCOUNTER — APPOINTMENT (OUTPATIENT)
Dept: RADIOLOGY | Facility: MEDICAL CENTER | Age: 71
DRG: 083 | End: 2021-01-01
Attending: EMERGENCY MEDICINE
Payer: MEDICARE

## 2021-01-01 ENCOUNTER — APPOINTMENT (OUTPATIENT)
Dept: RADIOLOGY | Facility: MEDICAL CENTER | Age: 71
DRG: 083 | End: 2021-01-01
Attending: NURSE PRACTITIONER
Payer: MEDICARE

## 2021-01-01 ENCOUNTER — HOSPICE ADMISSION (OUTPATIENT)
Dept: HOSPICE | Facility: HOSPICE | Age: 71
End: 2021-01-01
Payer: MEDICARE

## 2021-01-01 ENCOUNTER — HOME CARE VISIT (OUTPATIENT)
Dept: HOSPICE | Facility: HOSPICE | Age: 71
End: 2021-01-01
Payer: MEDICARE

## 2021-01-01 ENCOUNTER — HOSPITAL ENCOUNTER (INPATIENT)
Facility: REHABILITATION | Age: 71
End: 2021-01-01
Attending: PHYSICAL MEDICINE & REHABILITATION | Admitting: PHYSICAL MEDICINE & REHABILITATION
Payer: MEDICARE

## 2021-01-01 ENCOUNTER — HOSPITAL ENCOUNTER (INPATIENT)
Facility: MEDICAL CENTER | Age: 71
LOS: 7 days | DRG: 083 | End: 2021-01-28
Attending: EMERGENCY MEDICINE | Admitting: SURGERY
Payer: MEDICARE

## 2021-01-01 ENCOUNTER — HOSPITAL ENCOUNTER (INPATIENT)
Facility: MEDICAL CENTER | Age: 71
LOS: 9 days | DRG: 640 | End: 2021-05-06
Attending: EMERGENCY MEDICINE | Admitting: INTERNAL MEDICINE
Payer: MEDICARE

## 2021-01-01 VITALS
BODY MASS INDEX: 30.36 KG/M2 | OXYGEN SATURATION: 94 % | WEIGHT: 188.93 LBS | HEIGHT: 66 IN | TEMPERATURE: 98.2 F | DIASTOLIC BLOOD PRESSURE: 91 MMHG | SYSTOLIC BLOOD PRESSURE: 143 MMHG | RESPIRATION RATE: 16 BRPM | HEART RATE: 80 BPM

## 2021-01-01 VITALS — HEIGHT: 67 IN | TEMPERATURE: 98.2 F | WEIGHT: 223.77 LBS | BODY MASS INDEX: 35.12 KG/M2

## 2021-01-01 DIAGNOSIS — K70.30 ALCOHOLIC CIRRHOSIS, UNSPECIFIED WHETHER ASCITES PRESENT (HCC): ICD-10-CM

## 2021-01-01 DIAGNOSIS — K76.7 HEPATORENAL SYNDROME (HCC): ICD-10-CM

## 2021-01-01 DIAGNOSIS — E83.42 HYPOMAGNESEMIA: ICD-10-CM

## 2021-01-01 DIAGNOSIS — D63.1 ANEMIA DUE TO CHRONIC KIDNEY DISEASE, ON CHRONIC DIALYSIS (HCC): ICD-10-CM

## 2021-01-01 DIAGNOSIS — S06.5XAA SUBDURAL HEMATOMA (HCC): ICD-10-CM

## 2021-01-01 DIAGNOSIS — I10 ESSENTIAL HYPERTENSION: ICD-10-CM

## 2021-01-01 DIAGNOSIS — K74.60 CIRRHOSIS OF LIVER WITH ASCITES, UNSPECIFIED HEPATIC CIRRHOSIS TYPE (HCC): ICD-10-CM

## 2021-01-01 DIAGNOSIS — G93.40 ENCEPHALOPATHY: ICD-10-CM

## 2021-01-01 DIAGNOSIS — R18.8 CIRRHOSIS OF LIVER WITH ASCITES, UNSPECIFIED HEPATIC CIRRHOSIS TYPE (HCC): ICD-10-CM

## 2021-01-01 DIAGNOSIS — Z66 DNR (DO NOT RESUSCITATE): ICD-10-CM

## 2021-01-01 DIAGNOSIS — Z99.2 ANEMIA DUE TO CHRONIC KIDNEY DISEASE, ON CHRONIC DIALYSIS (HCC): ICD-10-CM

## 2021-01-01 DIAGNOSIS — N18.6 ANEMIA DUE TO CHRONIC KIDNEY DISEASE, ON CHRONIC DIALYSIS (HCC): ICD-10-CM

## 2021-01-01 DIAGNOSIS — F10.931 ALCOHOL WITHDRAWAL SYNDROME, WITH DELIRIUM (HCC): ICD-10-CM

## 2021-01-01 DIAGNOSIS — R79.89 LFT ELEVATION: ICD-10-CM

## 2021-01-01 DIAGNOSIS — E87.1 HYPONATREMIA: ICD-10-CM

## 2021-01-01 LAB
ALBUMIN SERPL BCP-MCNC: 2.5 G/DL (ref 3.2–4.9)
ALBUMIN SERPL BCP-MCNC: 2.7 G/DL (ref 3.2–4.9)
ALBUMIN SERPL BCP-MCNC: 2.8 G/DL (ref 3.2–4.9)
ALBUMIN SERPL BCP-MCNC: 3 G/DL (ref 3.2–4.9)
ALBUMIN SERPL BCP-MCNC: 3.3 G/DL (ref 3.2–4.9)
ALBUMIN SERPL BCP-MCNC: 3.3 G/DL (ref 3.2–4.9)
ALBUMIN/GLOB SERPL: 0.8 G/DL
ALBUMIN/GLOB SERPL: 0.9 G/DL
ALBUMIN/GLOB SERPL: 1 G/DL
ALBUMIN/GLOB SERPL: 1.1 G/DL
ALBUMIN/GLOB SERPL: 1.1 G/DL
ALP SERPL-CCNC: 180 U/L (ref 30–99)
ALP SERPL-CCNC: 182 U/L (ref 30–99)
ALP SERPL-CCNC: 188 U/L (ref 30–99)
ALP SERPL-CCNC: 202 U/L (ref 30–99)
ALP SERPL-CCNC: 216 U/L (ref 30–99)
ALP SERPL-CCNC: 219 U/L (ref 30–99)
ALP SERPL-CCNC: 226 U/L (ref 30–99)
ALP SERPL-CCNC: 227 U/L (ref 30–99)
ALP SERPL-CCNC: 246 U/L (ref 30–99)
ALT SERPL-CCNC: 127 U/L (ref 2–50)
ALT SERPL-CCNC: 19 U/L (ref 2–50)
ALT SERPL-CCNC: 19 U/L (ref 2–50)
ALT SERPL-CCNC: 23 U/L (ref 2–50)
ALT SERPL-CCNC: 67 U/L (ref 2–50)
ALT SERPL-CCNC: 77 U/L (ref 2–50)
ALT SERPL-CCNC: 85 U/L (ref 2–50)
ALT SERPL-CCNC: 88 U/L (ref 2–50)
ALT SERPL-CCNC: 88 U/L (ref 2–50)
ANION GAP SERPL CALC-SCNC: 10 MMOL/L (ref 7–16)
ANION GAP SERPL CALC-SCNC: 12 MMOL/L (ref 7–16)
ANION GAP SERPL CALC-SCNC: 13 MMOL/L (ref 7–16)
ANION GAP SERPL CALC-SCNC: 14 MMOL/L (ref 7–16)
ANION GAP SERPL CALC-SCNC: 9 MMOL/L (ref 7–16)
APTT PPP: 30.3 SEC (ref 24.7–36)
APTT PPP: 45.2 SEC (ref 24.7–36)
AST SERPL-CCNC: 122 U/L (ref 12–45)
AST SERPL-CCNC: 147 U/L (ref 12–45)
AST SERPL-CCNC: 168 U/L (ref 12–45)
AST SERPL-CCNC: 183 U/L (ref 12–45)
AST SERPL-CCNC: 193 U/L (ref 12–45)
AST SERPL-CCNC: 253 U/L (ref 12–45)
AST SERPL-CCNC: 69 U/L (ref 12–45)
AST SERPL-CCNC: 79 U/L (ref 12–45)
AST SERPL-CCNC: 85 U/L (ref 12–45)
BASOPHILS # BLD AUTO: 0.1 % (ref 0–1.8)
BASOPHILS # BLD AUTO: 0.2 % (ref 0–1.8)
BASOPHILS # BLD AUTO: 0.2 % (ref 0–1.8)
BASOPHILS # BLD AUTO: 0.3 % (ref 0–1.8)
BASOPHILS # BLD AUTO: 0.6 % (ref 0–1.8)
BASOPHILS # BLD AUTO: 0.7 % (ref 0–1.8)
BASOPHILS # BLD AUTO: 0.8 % (ref 0–1.8)
BASOPHILS # BLD: 0.01 K/UL (ref 0–0.12)
BASOPHILS # BLD: 0.01 K/UL (ref 0–0.12)
BASOPHILS # BLD: 0.02 K/UL (ref 0–0.12)
BASOPHILS # BLD: 0.02 K/UL (ref 0–0.12)
BASOPHILS # BLD: 0.03 K/UL (ref 0–0.12)
BASOPHILS # BLD: 0.03 K/UL (ref 0–0.12)
BASOPHILS # BLD: 0.04 K/UL (ref 0–0.12)
BILIRUB SERPL-MCNC: 1.7 MG/DL (ref 0.1–1.5)
BILIRUB SERPL-MCNC: 1.8 MG/DL (ref 0.1–1.5)
BILIRUB SERPL-MCNC: 2.1 MG/DL (ref 0.1–1.5)
BILIRUB SERPL-MCNC: 2.2 MG/DL (ref 0.1–1.5)
BILIRUB SERPL-MCNC: 2.4 MG/DL (ref 0.1–1.5)
BILIRUB SERPL-MCNC: 2.4 MG/DL (ref 0.1–1.5)
BILIRUB SERPL-MCNC: 2.6 MG/DL (ref 0.1–1.5)
BILIRUB SERPL-MCNC: 3 MG/DL (ref 0.1–1.5)
BILIRUB SERPL-MCNC: 4 MG/DL (ref 0.1–1.5)
BUN SERPL-MCNC: 30 MG/DL (ref 8–22)
BUN SERPL-MCNC: 33 MG/DL (ref 8–22)
BUN SERPL-MCNC: 37 MG/DL (ref 8–22)
BUN SERPL-MCNC: 4 MG/DL (ref 8–22)
BUN SERPL-MCNC: 5 MG/DL (ref 8–22)
BUN SERPL-MCNC: 5 MG/DL (ref 8–22)
BUN SERPL-MCNC: 6 MG/DL (ref 8–22)
CALCIUM SERPL-MCNC: 7.5 MG/DL (ref 8.5–10.5)
CALCIUM SERPL-MCNC: 7.6 MG/DL (ref 8.5–10.5)
CALCIUM SERPL-MCNC: 8.1 MG/DL (ref 8.5–10.5)
CALCIUM SERPL-MCNC: 8.2 MG/DL (ref 8.5–10.5)
CALCIUM SERPL-MCNC: 8.2 MG/DL (ref 8.5–10.5)
CALCIUM SERPL-MCNC: 8.3 MG/DL (ref 8.5–10.5)
CALCIUM SERPL-MCNC: 8.4 MG/DL (ref 8.5–10.5)
CALCIUM SERPL-MCNC: 8.5 MG/DL (ref 8.5–10.5)
CALCIUM SERPL-MCNC: 8.5 MG/DL (ref 8.5–10.5)
CALCIUM SERPL-MCNC: 8.7 MG/DL (ref 8.5–10.5)
CALCIUM SERPL-MCNC: 8.8 MG/DL (ref 8.5–10.5)
CFT BLD TEG: 5.4 MIN (ref 5–10)
CHLORIDE SERPL-SCNC: 101 MMOL/L (ref 96–112)
CHLORIDE SERPL-SCNC: 101 MMOL/L (ref 96–112)
CHLORIDE SERPL-SCNC: 104 MMOL/L (ref 96–112)
CHLORIDE SERPL-SCNC: 78 MMOL/L (ref 96–112)
CHLORIDE SERPL-SCNC: 79 MMOL/L (ref 96–112)
CHLORIDE SERPL-SCNC: 80 MMOL/L (ref 96–112)
CHLORIDE SERPL-SCNC: 91 MMOL/L (ref 96–112)
CHLORIDE SERPL-SCNC: 91 MMOL/L (ref 96–112)
CHLORIDE SERPL-SCNC: 96 MMOL/L (ref 96–112)
CHLORIDE SERPL-SCNC: 97 MMOL/L (ref 96–112)
CHLORIDE SERPL-SCNC: 98 MMOL/L (ref 96–112)
CLOT ANGLE BLD TEG: 70 DEGREES (ref 53–72)
CLOT LYSIS 30M P MA LENFR BLD TEG: 0.4 % (ref 0–8)
CO2 SERPL-SCNC: 20 MMOL/L (ref 20–33)
CO2 SERPL-SCNC: 21 MMOL/L (ref 20–33)
CO2 SERPL-SCNC: 22 MMOL/L (ref 20–33)
CO2 SERPL-SCNC: 22 MMOL/L (ref 20–33)
CO2 SERPL-SCNC: 23 MMOL/L (ref 20–33)
CORTIS SERPL-MCNC: 18.7 UG/DL (ref 0–23)
CREAT SERPL-MCNC: 0.51 MG/DL (ref 0.5–1.4)
CREAT SERPL-MCNC: 0.53 MG/DL (ref 0.5–1.4)
CREAT SERPL-MCNC: 0.53 MG/DL (ref 0.5–1.4)
CREAT SERPL-MCNC: 0.56 MG/DL (ref 0.5–1.4)
CREAT SERPL-MCNC: 0.56 MG/DL (ref 0.5–1.4)
CREAT SERPL-MCNC: 0.61 MG/DL (ref 0.5–1.4)
CREAT SERPL-MCNC: 0.69 MG/DL (ref 0.5–1.4)
CREAT SERPL-MCNC: 0.73 MG/DL (ref 0.5–1.4)
CREAT SERPL-MCNC: 4.25 MG/DL (ref 0.5–1.4)
CREAT SERPL-MCNC: 4.29 MG/DL (ref 0.5–1.4)
CREAT SERPL-MCNC: 4.46 MG/DL (ref 0.5–1.4)
CT.EXTRINSIC BLD ROTEM: 1.4 MIN (ref 1–3)
EOSINOPHIL # BLD AUTO: 0 K/UL (ref 0–0.51)
EOSINOPHIL # BLD AUTO: 0.03 K/UL (ref 0–0.51)
EOSINOPHIL # BLD AUTO: 0.05 K/UL (ref 0–0.51)
EOSINOPHIL # BLD AUTO: 0.05 K/UL (ref 0–0.51)
EOSINOPHIL # BLD AUTO: 0.06 K/UL (ref 0–0.51)
EOSINOPHIL # BLD AUTO: 0.1 K/UL (ref 0–0.51)
EOSINOPHIL # BLD AUTO: 0.18 K/UL (ref 0–0.51)
EOSINOPHIL # BLD AUTO: 0.19 K/UL (ref 0–0.51)
EOSINOPHIL # BLD AUTO: 0.22 K/UL (ref 0–0.51)
EOSINOPHIL NFR BLD: 0 % (ref 0–6.9)
EOSINOPHIL NFR BLD: 0.5 % (ref 0–6.9)
EOSINOPHIL NFR BLD: 1 % (ref 0–6.9)
EOSINOPHIL NFR BLD: 1.1 % (ref 0–6.9)
EOSINOPHIL NFR BLD: 1.2 % (ref 0–6.9)
EOSINOPHIL NFR BLD: 1.3 % (ref 0–6.9)
EOSINOPHIL NFR BLD: 1.4 % (ref 0–6.9)
EOSINOPHIL NFR BLD: 1.5 % (ref 0–6.9)
EOSINOPHIL NFR BLD: 2.1 % (ref 0–6.9)
ERYTHROCYTE [DISTWIDTH] IN BLOOD BY AUTOMATED COUNT: 49.2 FL (ref 35.9–50)
ERYTHROCYTE [DISTWIDTH] IN BLOOD BY AUTOMATED COUNT: 49.3 FL (ref 35.9–50)
ERYTHROCYTE [DISTWIDTH] IN BLOOD BY AUTOMATED COUNT: 49.3 FL (ref 35.9–50)
ERYTHROCYTE [DISTWIDTH] IN BLOOD BY AUTOMATED COUNT: 53.6 FL (ref 35.9–50)
ERYTHROCYTE [DISTWIDTH] IN BLOOD BY AUTOMATED COUNT: 53.8 FL (ref 35.9–50)
ERYTHROCYTE [DISTWIDTH] IN BLOOD BY AUTOMATED COUNT: 56.8 FL (ref 35.9–50)
ERYTHROCYTE [DISTWIDTH] IN BLOOD BY AUTOMATED COUNT: 59.7 FL (ref 35.9–50)
ERYTHROCYTE [DISTWIDTH] IN BLOOD BY AUTOMATED COUNT: 60.9 FL (ref 35.9–50)
ERYTHROCYTE [DISTWIDTH] IN BLOOD BY AUTOMATED COUNT: 61.7 FL (ref 35.9–50)
EST. AVERAGE GLUCOSE BLD GHB EST-MCNC: 100 MG/DL
FERRITIN SERPL-MCNC: 504 NG/ML (ref 10–291)
FLUAV RNA SPEC QL NAA+PROBE: NEGATIVE
FLUAV RNA SPEC QL NAA+PROBE: NEGATIVE
FLUBV RNA SPEC QL NAA+PROBE: NEGATIVE
FLUBV RNA SPEC QL NAA+PROBE: NEGATIVE
GLOBULIN SER CALC-MCNC: 2.3 G/DL (ref 1.9–3.5)
GLOBULIN SER CALC-MCNC: 2.7 G/DL (ref 1.9–3.5)
GLOBULIN SER CALC-MCNC: 2.9 G/DL (ref 1.9–3.5)
GLOBULIN SER CALC-MCNC: 2.9 G/DL (ref 1.9–3.5)
GLOBULIN SER CALC-MCNC: 3 G/DL (ref 1.9–3.5)
GLOBULIN SER CALC-MCNC: 3 G/DL (ref 1.9–3.5)
GLOBULIN SER CALC-MCNC: 3.3 G/DL (ref 1.9–3.5)
GLOBULIN SER CALC-MCNC: 3.4 G/DL (ref 1.9–3.5)
GLOBULIN SER CALC-MCNC: 4.2 G/DL (ref 1.9–3.5)
GLUCOSE SERPL-MCNC: 105 MG/DL (ref 65–99)
GLUCOSE SERPL-MCNC: 109 MG/DL (ref 65–99)
GLUCOSE SERPL-MCNC: 109 MG/DL (ref 65–99)
GLUCOSE SERPL-MCNC: 113 MG/DL (ref 65–99)
GLUCOSE SERPL-MCNC: 114 MG/DL (ref 65–99)
GLUCOSE SERPL-MCNC: 117 MG/DL (ref 65–99)
GLUCOSE SERPL-MCNC: 138 MG/DL (ref 65–99)
GLUCOSE SERPL-MCNC: 163 MG/DL (ref 65–99)
GLUCOSE SERPL-MCNC: 86 MG/DL (ref 65–99)
GLUCOSE SERPL-MCNC: 87 MG/DL (ref 65–99)
GLUCOSE SERPL-MCNC: 89 MG/DL (ref 65–99)
HBA1C MFR BLD: 5.1 % (ref 0–5.6)
HCT VFR BLD AUTO: 23.8 % (ref 37–47)
HCT VFR BLD AUTO: 24.6 % (ref 37–47)
HCT VFR BLD AUTO: 26.2 % (ref 37–47)
HCT VFR BLD AUTO: 27 % (ref 37–47)
HCT VFR BLD AUTO: 28.7 % (ref 37–47)
HCT VFR BLD AUTO: 28.7 % (ref 37–47)
HCT VFR BLD AUTO: 29 % (ref 37–47)
HCT VFR BLD AUTO: 30.3 % (ref 37–47)
HCT VFR BLD AUTO: 32.3 % (ref 37–47)
HGB BLD-MCNC: 10 G/DL (ref 12–16)
HGB BLD-MCNC: 10.1 G/DL (ref 12–16)
HGB BLD-MCNC: 10.3 G/DL (ref 12–16)
HGB BLD-MCNC: 11.9 G/DL (ref 12–16)
HGB BLD-MCNC: 8.2 G/DL (ref 12–16)
HGB BLD-MCNC: 8.7 G/DL (ref 12–16)
HGB BLD-MCNC: 9.6 G/DL (ref 12–16)
HGB BLD-MCNC: 9.6 G/DL (ref 12–16)
HGB BLD-MCNC: 9.8 G/DL (ref 12–16)
IMM GRANULOCYTES # BLD AUTO: 0.01 K/UL (ref 0–0.11)
IMM GRANULOCYTES # BLD AUTO: 0.02 K/UL (ref 0–0.11)
IMM GRANULOCYTES # BLD AUTO: 0.03 K/UL (ref 0–0.11)
IMM GRANULOCYTES # BLD AUTO: 0.03 K/UL (ref 0–0.11)
IMM GRANULOCYTES # BLD AUTO: 0.05 K/UL (ref 0–0.11)
IMM GRANULOCYTES # BLD AUTO: 0.06 K/UL (ref 0–0.11)
IMM GRANULOCYTES # BLD AUTO: 0.06 K/UL (ref 0–0.11)
IMM GRANULOCYTES # BLD AUTO: 0.07 K/UL (ref 0–0.11)
IMM GRANULOCYTES # BLD AUTO: 0.1 K/UL (ref 0–0.11)
IMM GRANULOCYTES NFR BLD AUTO: 0.2 % (ref 0–0.9)
IMM GRANULOCYTES NFR BLD AUTO: 0.4 % (ref 0–0.9)
IMM GRANULOCYTES NFR BLD AUTO: 0.4 % (ref 0–0.9)
IMM GRANULOCYTES NFR BLD AUTO: 0.5 % (ref 0–0.9)
IMM GRANULOCYTES NFR BLD AUTO: 0.5 % (ref 0–0.9)
IMM GRANULOCYTES NFR BLD AUTO: 0.6 % (ref 0–0.9)
IMM GRANULOCYTES NFR BLD AUTO: 0.6 % (ref 0–0.9)
IMM GRANULOCYTES NFR BLD AUTO: 0.8 % (ref 0–0.9)
IMM GRANULOCYTES NFR BLD AUTO: 1 % (ref 0–0.9)
INR PPP: 1.1 (ref 0.87–1.13)
INR PPP: 1.75 (ref 0.87–1.13)
INR PPP: 1.77 (ref 0.87–1.13)
IRON SATN MFR SERPL: ABNORMAL % (ref 15–55)
IRON SERPL-MCNC: 33 UG/DL (ref 40–170)
LACTATE BLD-SCNC: 1.8 MMOL/L (ref 0.5–2)
LIPASE SERPL-CCNC: 18 U/L (ref 11–82)
LYMPHOCYTES # BLD AUTO: 0.65 K/UL (ref 1–4.8)
LYMPHOCYTES # BLD AUTO: 0.71 K/UL (ref 1–4.8)
LYMPHOCYTES # BLD AUTO: 0.77 K/UL (ref 1–4.8)
LYMPHOCYTES # BLD AUTO: 0.83 K/UL (ref 1–4.8)
LYMPHOCYTES # BLD AUTO: 0.85 K/UL (ref 1–4.8)
LYMPHOCYTES # BLD AUTO: 0.86 K/UL (ref 1–4.8)
LYMPHOCYTES # BLD AUTO: 1.61 K/UL (ref 1–4.8)
LYMPHOCYTES # BLD AUTO: 1.93 K/UL (ref 1–4.8)
LYMPHOCYTES # BLD AUTO: 1.97 K/UL (ref 1–4.8)
LYMPHOCYTES NFR BLD: 12.7 % (ref 22–41)
LYMPHOCYTES NFR BLD: 13 % (ref 22–41)
LYMPHOCYTES NFR BLD: 13 % (ref 22–41)
LYMPHOCYTES NFR BLD: 13.6 % (ref 22–41)
LYMPHOCYTES NFR BLD: 13.9 % (ref 22–41)
LYMPHOCYTES NFR BLD: 15.4 % (ref 22–41)
LYMPHOCYTES NFR BLD: 17.2 % (ref 22–41)
LYMPHOCYTES NFR BLD: 17.9 % (ref 22–41)
LYMPHOCYTES NFR BLD: 9.4 % (ref 22–41)
MAGNESIUM SERPL-MCNC: 1.3 MG/DL (ref 1.5–2.5)
MAGNESIUM SERPL-MCNC: 1.5 MG/DL (ref 1.5–2.5)
MAGNESIUM SERPL-MCNC: 1.5 MG/DL (ref 1.5–2.5)
MAGNESIUM SERPL-MCNC: 1.6 MG/DL (ref 1.5–2.5)
MAGNESIUM SERPL-MCNC: 1.8 MG/DL (ref 1.5–2.5)
MAGNESIUM SERPL-MCNC: 2 MG/DL (ref 1.5–2.5)
MCF BLD TEG: 61.1 MM (ref 50–70)
MCH RBC QN AUTO: 33.1 PG (ref 27–33)
MCH RBC QN AUTO: 33.6 PG (ref 27–33)
MCH RBC QN AUTO: 33.7 PG (ref 27–33)
MCH RBC QN AUTO: 34.7 PG (ref 27–33)
MCH RBC QN AUTO: 34.9 PG (ref 27–33)
MCH RBC QN AUTO: 35.7 PG (ref 27–33)
MCH RBC QN AUTO: 35.8 PG (ref 27–33)
MCH RBC QN AUTO: 36.3 PG (ref 27–33)
MCH RBC QN AUTO: 36.4 PG (ref 27–33)
MCHC RBC AUTO-ENTMCNC: 33.4 G/DL (ref 33.6–35)
MCHC RBC AUTO-ENTMCNC: 34 G/DL (ref 33.6–35)
MCHC RBC AUTO-ENTMCNC: 34.5 G/DL (ref 33.6–35)
MCHC RBC AUTO-ENTMCNC: 34.8 G/DL (ref 33.6–35)
MCHC RBC AUTO-ENTMCNC: 34.8 G/DL (ref 33.6–35)
MCHC RBC AUTO-ENTMCNC: 35.4 G/DL (ref 33.6–35)
MCHC RBC AUTO-ENTMCNC: 36.3 G/DL (ref 33.6–35)
MCHC RBC AUTO-ENTMCNC: 36.6 G/DL (ref 33.6–35)
MCHC RBC AUTO-ENTMCNC: 36.8 G/DL (ref 33.6–35)
MCV RBC AUTO: 101.3 FL (ref 81.4–97.8)
MCV RBC AUTO: 102.5 FL (ref 81.4–97.8)
MCV RBC AUTO: 102.9 FL (ref 81.4–97.8)
MCV RBC AUTO: 106.7 FL (ref 81.4–97.8)
MCV RBC AUTO: 107.1 FL (ref 81.4–97.8)
MCV RBC AUTO: 91.2 FL (ref 81.4–97.8)
MCV RBC AUTO: 91.5 FL (ref 81.4–97.8)
MCV RBC AUTO: 91.6 FL (ref 81.4–97.8)
MCV RBC AUTO: 99.7 FL (ref 81.4–97.8)
MONOCYTES # BLD AUTO: 0.34 K/UL (ref 0–0.85)
MONOCYTES # BLD AUTO: 0.35 K/UL (ref 0–0.85)
MONOCYTES # BLD AUTO: 0.47 K/UL (ref 0–0.85)
MONOCYTES # BLD AUTO: 0.47 K/UL (ref 0–0.85)
MONOCYTES # BLD AUTO: 0.54 K/UL (ref 0–0.85)
MONOCYTES # BLD AUTO: 0.79 K/UL (ref 0–0.85)
MONOCYTES # BLD AUTO: 0.8 K/UL (ref 0–0.85)
MONOCYTES # BLD AUTO: 0.8 K/UL (ref 0–0.85)
MONOCYTES # BLD AUTO: 1.1 K/UL (ref 0–0.85)
MONOCYTES NFR BLD AUTO: 11.2 % (ref 0–13.4)
MONOCYTES NFR BLD AUTO: 13.5 % (ref 0–13.4)
MONOCYTES NFR BLD AUTO: 5.1 % (ref 0–13.4)
MONOCYTES NFR BLD AUTO: 5.6 % (ref 0–13.4)
MONOCYTES NFR BLD AUTO: 6.7 % (ref 0–13.4)
MONOCYTES NFR BLD AUTO: 7 % (ref 0–13.4)
MONOCYTES NFR BLD AUTO: 7.2 % (ref 0–13.4)
MONOCYTES NFR BLD AUTO: 7.4 % (ref 0–13.4)
MONOCYTES NFR BLD AUTO: 9.9 % (ref 0–13.4)
NEUTROPHILS # BLD AUTO: 11.11 K/UL (ref 2–7.15)
NEUTROPHILS # BLD AUTO: 11.89 K/UL (ref 2–7.15)
NEUTROPHILS # BLD AUTO: 3.27 K/UL (ref 2–7.15)
NEUTROPHILS # BLD AUTO: 3.35 K/UL (ref 2–7.15)
NEUTROPHILS # BLD AUTO: 3.47 K/UL (ref 2–7.15)
NEUTROPHILS # BLD AUTO: 3.92 K/UL (ref 2–7.15)
NEUTROPHILS # BLD AUTO: 4.26 K/UL (ref 2–7.15)
NEUTROPHILS # BLD AUTO: 7.77 K/UL (ref 2–7.15)
NEUTROPHILS # BLD AUTO: 9.17 K/UL (ref 2–7.15)
NEUTROPHILS NFR BLD: 68.7 % (ref 44–72)
NEUTROPHILS NFR BLD: 69.4 % (ref 44–72)
NEUTROPHILS NFR BLD: 71.7 % (ref 44–72)
NEUTROPHILS NFR BLD: 75.2 % (ref 44–72)
NEUTROPHILS NFR BLD: 77.2 % (ref 44–72)
NEUTROPHILS NFR BLD: 77.9 % (ref 44–72)
NEUTROPHILS NFR BLD: 78.2 % (ref 44–72)
NEUTROPHILS NFR BLD: 78.5 % (ref 44–72)
NEUTROPHILS NFR BLD: 84.9 % (ref 44–72)
NRBC # BLD AUTO: 0 K/UL
NRBC BLD-RTO: 0 /100 WBC
NT-PROBNP SERPL IA-MCNC: 1516 PG/ML (ref 0–125)
OSMOLALITY SERPL: 244 MOSM/KG H2O (ref 278–298)
OSMOLALITY UR: 226 MOSM/KG H2O (ref 300–900)
PA AA BLD-ACNC: 28.7 %
PA ADP BLD-ACNC: 56.1 %
PHOSPHATE SERPL-MCNC: 1.4 MG/DL (ref 2.5–4.5)
PHOSPHATE SERPL-MCNC: 1.9 MG/DL (ref 2.5–4.5)
PHOSPHATE SERPL-MCNC: 2.3 MG/DL (ref 2.5–4.5)
PHOSPHATE SERPL-MCNC: 3.1 MG/DL (ref 2.5–4.5)
PHOSPHATE SERPL-MCNC: 3.6 MG/DL (ref 2.5–4.5)
PHOSPHATE SERPL-MCNC: 3.6 MG/DL (ref 2.5–4.5)
PHOSPHATE SERPL-MCNC: 3.8 MG/DL (ref 2.5–4.5)
PLATELET # BLD AUTO: 109 K/UL (ref 164–446)
PLATELET # BLD AUTO: 123 K/UL (ref 164–446)
PLATELET # BLD AUTO: 137 K/UL (ref 164–446)
PLATELET # BLD AUTO: 165 K/UL (ref 164–446)
PLATELET # BLD AUTO: 169 K/UL (ref 164–446)
PLATELET # BLD AUTO: 191 K/UL (ref 164–446)
PLATELET # BLD AUTO: 216 K/UL (ref 164–446)
PLATELET # BLD AUTO: 230 K/UL (ref 164–446)
PLATELET # BLD AUTO: 245 K/UL (ref 164–446)
PMV BLD AUTO: 10.2 FL (ref 9–12.9)
PMV BLD AUTO: 10.2 FL (ref 9–12.9)
PMV BLD AUTO: 10.3 FL (ref 9–12.9)
PMV BLD AUTO: 10.4 FL (ref 9–12.9)
PMV BLD AUTO: 10.7 FL (ref 9–12.9)
PMV BLD AUTO: 10.7 FL (ref 9–12.9)
POTASSIUM SERPL-SCNC: 2.7 MMOL/L (ref 3.6–5.5)
POTASSIUM SERPL-SCNC: 3.4 MMOL/L (ref 3.6–5.5)
POTASSIUM SERPL-SCNC: 3.4 MMOL/L (ref 3.6–5.5)
POTASSIUM SERPL-SCNC: 3.6 MMOL/L (ref 3.6–5.5)
POTASSIUM SERPL-SCNC: 3.9 MMOL/L (ref 3.6–5.5)
POTASSIUM SERPL-SCNC: 4 MMOL/L (ref 3.6–5.5)
POTASSIUM SERPL-SCNC: 4 MMOL/L (ref 3.6–5.5)
POTASSIUM SERPL-SCNC: 4.2 MMOL/L (ref 3.6–5.5)
POTASSIUM SERPL-SCNC: 4.4 MMOL/L (ref 3.6–5.5)
POTASSIUM SERPL-SCNC: 4.8 MMOL/L (ref 3.6–5.5)
POTASSIUM SERPL-SCNC: 5.2 MMOL/L (ref 3.6–5.5)
PROT SERPL-MCNC: 4.8 G/DL (ref 6–8.2)
PROT SERPL-MCNC: 5.4 G/DL (ref 6–8.2)
PROT SERPL-MCNC: 5.7 G/DL (ref 6–8.2)
PROT SERPL-MCNC: 5.8 G/DL (ref 6–8.2)
PROT SERPL-MCNC: 5.9 G/DL (ref 6–8.2)
PROT SERPL-MCNC: 6.1 G/DL (ref 6–8.2)
PROT SERPL-MCNC: 6.2 G/DL (ref 6–8.2)
PROT SERPL-MCNC: 6.3 G/DL (ref 6–8.2)
PROT SERPL-MCNC: 7.5 G/DL (ref 6–8.2)
PROTHROMBIN TIME: 14.5 SEC (ref 12–14.6)
PROTHROMBIN TIME: 21 SEC (ref 12–14.6)
PROTHROMBIN TIME: 21.2 SEC (ref 12–14.6)
RBC # BLD AUTO: 2.35 M/UL (ref 4.2–5.4)
RBC # BLD AUTO: 2.39 M/UL (ref 4.2–5.4)
RBC # BLD AUTO: 2.68 M/UL (ref 4.2–5.4)
RBC # BLD AUTO: 2.8 M/UL (ref 4.2–5.4)
RBC # BLD AUTO: 2.84 M/UL (ref 4.2–5.4)
RBC # BLD AUTO: 2.86 M/UL (ref 4.2–5.4)
RBC # BLD AUTO: 2.91 M/UL (ref 4.2–5.4)
RBC # BLD AUTO: 2.96 M/UL (ref 4.2–5.4)
RBC # BLD AUTO: 3.53 M/UL (ref 4.2–5.4)
RSV RNA SPEC QL NAA+PROBE: NEGATIVE
RSV RNA SPEC QL NAA+PROBE: NEGATIVE
SARS-COV-2 RNA RESP QL NAA+PROBE: NOTDETECTED
SARS-COV-2 RNA RESP QL NAA+PROBE: NOTDETECTED
SODIUM SERPL-SCNC: 113 MMOL/L (ref 135–145)
SODIUM SERPL-SCNC: 113 MMOL/L (ref 135–145)
SODIUM SERPL-SCNC: 114 MMOL/L (ref 135–145)
SODIUM SERPL-SCNC: 116 MMOL/L (ref 135–145)
SODIUM SERPL-SCNC: 116 MMOL/L (ref 135–145)
SODIUM SERPL-SCNC: 125 MMOL/L (ref 135–145)
SODIUM SERPL-SCNC: 126 MMOL/L (ref 135–145)
SODIUM SERPL-SCNC: 127 MMOL/L (ref 135–145)
SODIUM SERPL-SCNC: 128 MMOL/L (ref 135–145)
SODIUM SERPL-SCNC: 128 MMOL/L (ref 135–145)
SODIUM SERPL-SCNC: 131 MMOL/L (ref 135–145)
SODIUM SERPL-SCNC: 133 MMOL/L (ref 135–145)
SODIUM SERPL-SCNC: 135 MMOL/L (ref 135–145)
SODIUM UR-SCNC: 26 MMOL/L
SPECIMEN SOURCE: NORMAL
SPECIMEN SOURCE: NORMAL
T4 FREE SERPL-MCNC: 1.7 NG/DL (ref 0.93–1.7)
TEG ALGORITHM TGALG: NORMAL
TIBC SERPL-MCNC: 86 UG/DL (ref 250–450)
TSH SERPL DL<=0.005 MIU/L-ACNC: 5.27 UIU/ML (ref 0.38–5.33)
UIBC SERPL-MCNC: 53 UG/DL (ref 110–370)
WBC # BLD AUTO: 11.9 K/UL (ref 4.8–10.8)
WBC # BLD AUTO: 14.2 K/UL (ref 4.8–10.8)
WBC # BLD AUTO: 15.3 K/UL (ref 4.8–10.8)
WBC # BLD AUTO: 4.6 K/UL (ref 4.8–10.8)
WBC # BLD AUTO: 4.8 K/UL (ref 4.8–10.8)
WBC # BLD AUTO: 4.8 K/UL (ref 4.8–10.8)
WBC # BLD AUTO: 5 K/UL (ref 4.8–10.8)
WBC # BLD AUTO: 5.9 K/UL (ref 4.8–10.8)
WBC # BLD AUTO: 9.2 K/UL (ref 4.8–10.8)

## 2021-01-01 PROCEDURE — 97165 OT EVAL LOW COMPLEX 30 MIN: CPT

## 2021-01-01 PROCEDURE — A9270 NON-COVERED ITEM OR SERVICE: HCPCS | Performed by: NEUROLOGICAL SURGERY

## 2021-01-01 PROCEDURE — 83735 ASSAY OF MAGNESIUM: CPT

## 2021-01-01 PROCEDURE — 700105 HCHG RX REV CODE 258: Performed by: SURGERY

## 2021-01-01 PROCEDURE — 80053 COMPREHEN METABOLIC PANEL: CPT

## 2021-01-01 PROCEDURE — 36415 COLL VENOUS BLD VENIPUNCTURE: CPT

## 2021-01-01 PROCEDURE — 93970 EXTREMITY STUDY: CPT

## 2021-01-01 PROCEDURE — 83690 ASSAY OF LIPASE: CPT

## 2021-01-01 PROCEDURE — 700102 HCHG RX REV CODE 250 W/ 637 OVERRIDE(OP): Performed by: NURSE PRACTITIONER

## 2021-01-01 PROCEDURE — 85025 COMPLETE CBC W/AUTO DIFF WBC: CPT

## 2021-01-01 PROCEDURE — 700102 HCHG RX REV CODE 250 W/ 637 OVERRIDE(OP): Performed by: INTERNAL MEDICINE

## 2021-01-01 PROCEDURE — 770001 HCHG ROOM/CARE - MED/SURG/GYN PRIV*

## 2021-01-01 PROCEDURE — 700102 HCHG RX REV CODE 250 W/ 637 OVERRIDE(OP): Performed by: HOSPITALIST

## 2021-01-01 PROCEDURE — A9270 NON-COVERED ITEM OR SERVICE: HCPCS | Performed by: HOSPITALIST

## 2021-01-01 PROCEDURE — A9270 NON-COVERED ITEM OR SERVICE: HCPCS | Performed by: STUDENT IN AN ORGANIZED HEALTH CARE EDUCATION/TRAINING PROGRAM

## 2021-01-01 PROCEDURE — 770022 HCHG ROOM/CARE - ICU (200)

## 2021-01-01 PROCEDURE — 700101 HCHG RX REV CODE 250: Performed by: NURSE PRACTITIONER

## 2021-01-01 PROCEDURE — 83930 ASSAY OF BLOOD OSMOLALITY: CPT

## 2021-01-01 PROCEDURE — 84443 ASSAY THYROID STIM HORMONE: CPT

## 2021-01-01 PROCEDURE — 700101 HCHG RX REV CODE 250: Performed by: SURGERY

## 2021-01-01 PROCEDURE — 84100 ASSAY OF PHOSPHORUS: CPT

## 2021-01-01 PROCEDURE — 85610 PROTHROMBIN TIME: CPT

## 2021-01-01 PROCEDURE — 85576 BLOOD PLATELET AGGREGATION: CPT | Mod: 91

## 2021-01-01 PROCEDURE — 82728 ASSAY OF FERRITIN: CPT

## 2021-01-01 PROCEDURE — 700111 HCHG RX REV CODE 636 W/ 250 OVERRIDE (IP): Performed by: NURSE PRACTITIONER

## 2021-01-01 PROCEDURE — 700102 HCHG RX REV CODE 250 W/ 637 OVERRIDE(OP): Performed by: SURGERY

## 2021-01-01 PROCEDURE — 700111 HCHG RX REV CODE 636 W/ 250 OVERRIDE (IP): Performed by: EMERGENCY MEDICINE

## 2021-01-01 PROCEDURE — 700117 HCHG RX CONTRAST REV CODE 255: Performed by: EMERGENCY MEDICINE

## 2021-01-01 PROCEDURE — 96374 THER/PROPH/DIAG INJ IV PUSH: CPT

## 2021-01-01 PROCEDURE — 71260 CT THORAX DX C+: CPT

## 2021-01-01 PROCEDURE — 700111 HCHG RX REV CODE 636 W/ 250 OVERRIDE (IP): Performed by: INTERNAL MEDICINE

## 2021-01-01 PROCEDURE — 82533 TOTAL CORTISOL: CPT

## 2021-01-01 PROCEDURE — 99291 CRITICAL CARE FIRST HOUR: CPT

## 2021-01-01 PROCEDURE — 99233 SBSQ HOSP IP/OBS HIGH 50: CPT | Performed by: SURGERY

## 2021-01-01 PROCEDURE — A9270 NON-COVERED ITEM OR SERVICE: HCPCS | Performed by: NURSE PRACTITIONER

## 2021-01-01 PROCEDURE — A9270 NON-COVERED ITEM OR SERVICE: HCPCS | Performed by: SURGERY

## 2021-01-01 PROCEDURE — 85730 THROMBOPLASTIN TIME PARTIAL: CPT

## 2021-01-01 PROCEDURE — 700111 HCHG RX REV CODE 636 W/ 250 OVERRIDE (IP): Performed by: HOSPITALIST

## 2021-01-01 PROCEDURE — HZ2ZZZZ DETOXIFICATION SERVICES FOR SUBSTANCE ABUSE TREATMENT: ICD-10-PCS | Performed by: EMERGENCY MEDICINE

## 2021-01-01 PROCEDURE — 99291 CRITICAL CARE FIRST HOUR: CPT | Performed by: INTERNAL MEDICINE

## 2021-01-01 PROCEDURE — 700101 HCHG RX REV CODE 250: Performed by: HOSPITALIST

## 2021-01-01 PROCEDURE — 83036 HEMOGLOBIN GLYCOSYLATED A1C: CPT

## 2021-01-01 PROCEDURE — C9803 HOPD COVID-19 SPEC COLLECT: HCPCS | Performed by: EMERGENCY MEDICINE

## 2021-01-01 PROCEDURE — 700102 HCHG RX REV CODE 250 W/ 637 OVERRIDE(OP): Performed by: NEUROLOGICAL SURGERY

## 2021-01-01 PROCEDURE — 83880 ASSAY OF NATRIURETIC PEPTIDE: CPT

## 2021-01-01 PROCEDURE — 73560 X-RAY EXAM OF KNEE 1 OR 2: CPT | Mod: LT

## 2021-01-01 PROCEDURE — 700102 HCHG RX REV CODE 250 W/ 637 OVERRIDE(OP): Performed by: STUDENT IN AN ORGANIZED HEALTH CARE EDUCATION/TRAINING PROGRAM

## 2021-01-01 PROCEDURE — 85347 COAGULATION TIME ACTIVATED: CPT

## 2021-01-01 PROCEDURE — 83550 IRON BINDING TEST: CPT

## 2021-01-01 PROCEDURE — A9576 INJ PROHANCE MULTIPACK: HCPCS | Performed by: NURSE PRACTITIONER

## 2021-01-01 PROCEDURE — 770006 HCHG ROOM/CARE - MED/SURG/GYN SEMI*

## 2021-01-01 PROCEDURE — 92523 SPEECH SOUND LANG COMPREHEN: CPT

## 2021-01-01 PROCEDURE — 700117 HCHG RX CONTRAST REV CODE 255: Performed by: NURSE PRACTITIONER

## 2021-01-01 PROCEDURE — 99231 SBSQ HOSP IP/OBS SF/LOW 25: CPT | Performed by: HOSPITALIST

## 2021-01-01 PROCEDURE — 99222 1ST HOSP IP/OBS MODERATE 55: CPT | Performed by: PHYSICAL MEDICINE & REHABILITATION

## 2021-01-01 PROCEDURE — 96375 TX/PRO/DX INJ NEW DRUG ADDON: CPT

## 2021-01-01 PROCEDURE — 83605 ASSAY OF LACTIC ACID: CPT

## 2021-01-01 PROCEDURE — 99233 SBSQ HOSP IP/OBS HIGH 50: CPT | Performed by: HOSPITALIST

## 2021-01-01 PROCEDURE — 70553 MRI BRAIN STEM W/O & W/DYE: CPT

## 2021-01-01 PROCEDURE — 73560 X-RAY EXAM OF KNEE 1 OR 2: CPT | Mod: RT

## 2021-01-01 PROCEDURE — A9270 NON-COVERED ITEM OR SERVICE: HCPCS | Performed by: INTERNAL MEDICINE

## 2021-01-01 PROCEDURE — 700101 HCHG RX REV CODE 250: Performed by: EMERGENCY MEDICINE

## 2021-01-01 PROCEDURE — 700111 HCHG RX REV CODE 636 W/ 250 OVERRIDE (IP): Performed by: SURGERY

## 2021-01-01 PROCEDURE — 70496 CT ANGIOGRAPHY HEAD: CPT | Mod: ME

## 2021-01-01 PROCEDURE — 0241U HCHG SARS-COV-2 COVID-19 NFCT DS RESP RNA 4 TRGT MIC: CPT

## 2021-01-01 PROCEDURE — 84295 ASSAY OF SERUM SODIUM: CPT

## 2021-01-01 PROCEDURE — 700117 HCHG RX CONTRAST REV CODE 255: Performed by: STUDENT IN AN ORGANIZED HEALTH CARE EDUCATION/TRAINING PROGRAM

## 2021-01-01 PROCEDURE — 97535 SELF CARE MNGMENT TRAINING: CPT | Mod: CO

## 2021-01-01 PROCEDURE — 97162 PT EVAL MOD COMPLEX 30 MIN: CPT

## 2021-01-01 PROCEDURE — 99222 1ST HOSP IP/OBS MODERATE 55: CPT | Performed by: INTERNAL MEDICINE

## 2021-01-01 PROCEDURE — 97530 THERAPEUTIC ACTIVITIES: CPT | Mod: CQ

## 2021-01-01 PROCEDURE — 700102 HCHG RX REV CODE 250 W/ 637 OVERRIDE(OP): Performed by: EMERGENCY MEDICINE

## 2021-01-01 PROCEDURE — 83540 ASSAY OF IRON: CPT

## 2021-01-01 PROCEDURE — 96365 THER/PROPH/DIAG IV INF INIT: CPT

## 2021-01-01 PROCEDURE — 85025 COMPLETE CBC W/AUTO DIFF WBC: CPT | Mod: 91

## 2021-01-01 PROCEDURE — 72125 CT NECK SPINE W/O DYE: CPT

## 2021-01-01 PROCEDURE — 51798 US URINE CAPACITY MEASURE: CPT

## 2021-01-01 PROCEDURE — 84300 ASSAY OF URINE SODIUM: CPT

## 2021-01-01 PROCEDURE — 83935 ASSAY OF URINE OSMOLALITY: CPT

## 2021-01-01 PROCEDURE — A9270 NON-COVERED ITEM OR SERVICE: HCPCS | Performed by: EMERGENCY MEDICINE

## 2021-01-01 PROCEDURE — 80048 BASIC METABOLIC PNL TOTAL CA: CPT

## 2021-01-01 PROCEDURE — 99223 1ST HOSP IP/OBS HIGH 75: CPT | Mod: AI | Performed by: STUDENT IN AN ORGANIZED HEALTH CARE EDUCATION/TRAINING PROGRAM

## 2021-01-01 PROCEDURE — 700101 HCHG RX REV CODE 250

## 2021-01-01 PROCEDURE — 99233 SBSQ HOSP IP/OBS HIGH 50: CPT | Performed by: EMERGENCY MEDICINE

## 2021-01-01 PROCEDURE — 84439 ASSAY OF FREE THYROXINE: CPT

## 2021-01-01 PROCEDURE — 99239 HOSP IP/OBS DSCHRG MGMT >30: CPT | Performed by: HOSPITALIST

## 2021-01-01 PROCEDURE — 94760 N-INVAS EAR/PLS OXIMETRY 1: CPT

## 2021-01-01 PROCEDURE — 70450 CT HEAD/BRAIN W/O DYE: CPT

## 2021-01-01 PROCEDURE — 99291 CRITICAL CARE FIRST HOUR: CPT | Performed by: SURGERY

## 2021-01-01 PROCEDURE — 80053 COMPREHEN METABOLIC PANEL: CPT | Mod: 91

## 2021-01-01 PROCEDURE — 97116 GAIT TRAINING THERAPY: CPT | Mod: CQ

## 2021-01-01 PROCEDURE — 93970 EXTREMITY STUDY: CPT | Mod: 26 | Performed by: INTERNAL MEDICINE

## 2021-01-01 PROCEDURE — 94640 AIRWAY INHALATION TREATMENT: CPT

## 2021-01-01 PROCEDURE — 85384 FIBRINOGEN ACTIVITY: CPT

## 2021-01-01 RX ORDER — LEVOTHYROXINE SODIUM 0.1 MG/1
100 TABLET ORAL EVERY MORNING
Status: DISCONTINUED | OUTPATIENT
Start: 2021-01-01 | End: 2021-01-01 | Stop reason: HOSPADM

## 2021-01-01 RX ORDER — ACETAMINOPHEN 325 MG/1
650 TABLET ORAL EVERY 6 HOURS PRN
Status: DISCONTINUED | OUTPATIENT
Start: 2021-01-01 | End: 2021-01-01

## 2021-01-01 RX ORDER — DEXAMETHASONE 2 MG/1
2 TABLET ORAL EVERY 12 HOURS
Refills: 0 | Status: ON HOLD
Start: 2021-01-01 | End: 2021-01-01

## 2021-01-01 RX ORDER — FLUDROCORTISONE ACETATE 0.1 MG/1
0.2 TABLET ORAL EVERY 12 HOURS
Status: ON HOLD
Start: 2021-01-01 | End: 2021-01-01

## 2021-01-01 RX ORDER — AMOXICILLIN 250 MG
1 CAPSULE ORAL
Status: DISCONTINUED | OUTPATIENT
Start: 2021-01-01 | End: 2021-01-01 | Stop reason: HOSPADM

## 2021-01-01 RX ORDER — LORAZEPAM 1 MG/1
0.5 TABLET ORAL EVERY 4 HOURS PRN
Status: DISCONTINUED | OUTPATIENT
Start: 2021-01-01 | End: 2021-01-01

## 2021-01-01 RX ORDER — LABETALOL HYDROCHLORIDE 5 MG/ML
10-20 INJECTION, SOLUTION INTRAVENOUS EVERY 4 HOURS PRN
Status: DISCONTINUED | OUTPATIENT
Start: 2021-01-01 | End: 2021-01-01

## 2021-01-01 RX ORDER — ASCORBIC ACID 500 MG
500 TABLET ORAL DAILY
Status: ON HOLD | COMMUNITY
End: 2021-01-01

## 2021-01-01 RX ORDER — MAGNESIUM SULFATE HEPTAHYDRATE 40 MG/ML
2 INJECTION, SOLUTION INTRAVENOUS ONCE
Status: COMPLETED | OUTPATIENT
Start: 2021-01-01 | End: 2021-01-01

## 2021-01-01 RX ORDER — SCOLOPAMINE TRANSDERMAL SYSTEM 1 MG/1
1 PATCH, EXTENDED RELEASE TRANSDERMAL
Status: DISCONTINUED | OUTPATIENT
Start: 2021-01-01 | End: 2021-01-01 | Stop reason: HOSPADM

## 2021-01-01 RX ORDER — LORAZEPAM 1 MG/1
0.5 TABLET ORAL EVERY 4 HOURS PRN
Status: DISCONTINUED | OUTPATIENT
Start: 2021-01-01 | End: 2021-01-01 | Stop reason: HOSPADM

## 2021-01-01 RX ORDER — FLUDROCORTISONE ACETATE 0.1 MG/1
0.2 TABLET ORAL EVERY 12 HOURS
Status: DISCONTINUED | OUTPATIENT
Start: 2021-01-01 | End: 2021-01-01 | Stop reason: HOSPADM

## 2021-01-01 RX ORDER — LISINOPRIL 5 MG/1
5 TABLET ORAL DAILY
Status: DISCONTINUED | OUTPATIENT
Start: 2021-01-01 | End: 2021-01-01 | Stop reason: HOSPADM

## 2021-01-01 RX ORDER — TRAMADOL HYDROCHLORIDE 50 MG/1
50 TABLET ORAL EVERY 4 HOURS PRN
Status: ON HOLD | COMMUNITY
End: 2021-01-01

## 2021-01-01 RX ORDER — FUROSEMIDE 10 MG/ML
80 INJECTION INTRAMUSCULAR; INTRAVENOUS ONCE
Status: COMPLETED | OUTPATIENT
Start: 2021-01-01 | End: 2021-01-01

## 2021-01-01 RX ORDER — HYDROMORPHONE HYDROCHLORIDE 1 MG/ML
0.5 INJECTION, SOLUTION INTRAMUSCULAR; INTRAVENOUS; SUBCUTANEOUS
Status: DISCONTINUED | OUTPATIENT
Start: 2021-01-01 | End: 2021-01-01

## 2021-01-01 RX ORDER — LEVOTHYROXINE SODIUM 0.07 MG/1
75 TABLET ORAL
Status: DISCONTINUED | OUTPATIENT
Start: 2021-01-01 | End: 2021-01-01

## 2021-01-01 RX ORDER — HYDROMORPHONE HYDROCHLORIDE 1 MG/ML
.5-1 INJECTION, SOLUTION INTRAMUSCULAR; INTRAVENOUS; SUBCUTANEOUS
Status: DISCONTINUED | OUTPATIENT
Start: 2021-01-01 | End: 2021-01-01 | Stop reason: HOSPADM

## 2021-01-01 RX ORDER — LORAZEPAM 2 MG/1
4 TABLET ORAL
Status: DISCONTINUED | OUTPATIENT
Start: 2021-01-01 | End: 2021-01-01

## 2021-01-01 RX ORDER — SODIUM CHLORIDE 1 G/1
2 TABLET ORAL
Status: DISCONTINUED | OUTPATIENT
Start: 2021-01-01 | End: 2021-01-01

## 2021-01-01 RX ORDER — MAGNESIUM SULFATE HEPTAHYDRATE 40 MG/ML
2 INJECTION, SOLUTION INTRAVENOUS ONCE
Status: DISCONTINUED | OUTPATIENT
Start: 2021-01-01 | End: 2021-01-01

## 2021-01-01 RX ORDER — POLYETHYLENE GLYCOL 3350 17 G/17G
1 POWDER, FOR SOLUTION ORAL 2 TIMES DAILY
Status: DISCONTINUED | OUTPATIENT
Start: 2021-01-01 | End: 2021-01-01 | Stop reason: HOSPADM

## 2021-01-01 RX ORDER — GAUZE BANDAGE 2" X 2"
100 BANDAGE TOPICAL DAILY
Status: COMPLETED | OUTPATIENT
Start: 2021-01-01 | End: 2021-01-01

## 2021-01-01 RX ORDER — LORAZEPAM 2 MG/ML
0.5 INJECTION INTRAMUSCULAR EVERY 4 HOURS PRN
Status: DISCONTINUED | OUTPATIENT
Start: 2021-01-01 | End: 2021-01-01

## 2021-01-01 RX ORDER — LANOLIN ALCOHOL/MO/W.PET/CERES
100 CREAM (GRAM) TOPICAL DAILY
Status: ON HOLD
Start: 2021-01-01 | End: 2021-01-01

## 2021-01-01 RX ORDER — HYDROMORPHONE HYDROCHLORIDE 1 MG/ML
1 INJECTION, SOLUTION INTRAMUSCULAR; INTRAVENOUS; SUBCUTANEOUS
Status: DISCONTINUED | OUTPATIENT
Start: 2021-01-01 | End: 2021-01-01

## 2021-01-01 RX ORDER — FAMOTIDINE 20 MG/1
20 TABLET, FILM COATED ORAL 2 TIMES DAILY
Status: DISCONTINUED | OUTPATIENT
Start: 2021-01-01 | End: 2021-01-01

## 2021-01-01 RX ORDER — LORAZEPAM 2 MG/ML
2 INJECTION INTRAMUSCULAR
Status: DISCONTINUED | OUTPATIENT
Start: 2021-01-01 | End: 2021-01-01

## 2021-01-01 RX ORDER — POLYVINYL ALCOHOL 14 MG/ML
2 SOLUTION/ DROPS OPHTHALMIC EVERY 6 HOURS PRN
Status: DISCONTINUED | OUTPATIENT
Start: 2021-01-01 | End: 2021-01-01 | Stop reason: HOSPADM

## 2021-01-01 RX ORDER — LORAZEPAM 2 MG/ML
1 INJECTION INTRAMUSCULAR
Status: DISCONTINUED | OUTPATIENT
Start: 2021-01-01 | End: 2021-01-01

## 2021-01-01 RX ORDER — CHOLECALCIFEROL (VITAMIN D3) 125 MCG
5 CAPSULE ORAL NIGHTLY PRN
Status: ON HOLD | COMMUNITY
Start: 2021-01-01 | End: 2021-01-01

## 2021-01-01 RX ORDER — AMOXICILLIN 250 MG
2 CAPSULE ORAL 2 TIMES DAILY
Status: DISCONTINUED | OUTPATIENT
Start: 2021-01-01 | End: 2021-01-01

## 2021-01-01 RX ORDER — ONDANSETRON 2 MG/ML
4 INJECTION INTRAMUSCULAR; INTRAVENOUS EVERY 4 HOURS PRN
Status: DISCONTINUED | OUTPATIENT
Start: 2021-01-01 | End: 2021-01-01 | Stop reason: HOSPADM

## 2021-01-01 RX ORDER — BISACODYL 10 MG
10 SUPPOSITORY, RECTAL RECTAL
Status: DISCONTINUED | OUTPATIENT
Start: 2021-01-01 | End: 2021-01-01 | Stop reason: HOSPADM

## 2021-01-01 RX ORDER — ROPINIROLE 0.5 MG/1
0.25 TABLET, FILM COATED ORAL
Status: DISCONTINUED | OUTPATIENT
Start: 2021-01-01 | End: 2021-01-01

## 2021-01-01 RX ORDER — POTASSIUM CHLORIDE 20 MEQ/1
20 TABLET, EXTENDED RELEASE ORAL ONCE
Status: COMPLETED | OUTPATIENT
Start: 2021-01-01 | End: 2021-01-01

## 2021-01-01 RX ORDER — TRAMADOL HYDROCHLORIDE 50 MG/1
50 TABLET ORAL EVERY 4 HOURS PRN
Status: DISCONTINUED | OUTPATIENT
Start: 2021-01-01 | End: 2021-01-01 | Stop reason: HOSPADM

## 2021-01-01 RX ORDER — FOLIC ACID 1 MG/1
1 TABLET ORAL DAILY
Status: DISCONTINUED | OUTPATIENT
Start: 2021-01-01 | End: 2021-01-01 | Stop reason: HOSPADM

## 2021-01-01 RX ORDER — OXYCODONE HYDROCHLORIDE 10 MG/1
10 TABLET ORAL
Status: DISCONTINUED | OUTPATIENT
Start: 2021-01-01 | End: 2021-01-01

## 2021-01-01 RX ORDER — TRAMADOL HYDROCHLORIDE 50 MG/1
25 TABLET ORAL EVERY 6 HOURS PRN
Status: DISCONTINUED | OUTPATIENT
Start: 2021-01-01 | End: 2021-01-01

## 2021-01-01 RX ORDER — OXYCODONE HYDROCHLORIDE 5 MG/1
5 TABLET ORAL
Status: DISCONTINUED | OUTPATIENT
Start: 2021-01-01 | End: 2021-01-01

## 2021-01-01 RX ORDER — SODIUM CHLORIDE 9 MG/ML
INJECTION, SOLUTION INTRAVENOUS CONTINUOUS
Status: DISCONTINUED | OUTPATIENT
Start: 2021-01-01 | End: 2021-01-01

## 2021-01-01 RX ORDER — ENEMA 19; 7 G/133ML; G/133ML
1 ENEMA RECTAL
Status: DISCONTINUED | OUTPATIENT
Start: 2021-01-01 | End: 2021-01-01 | Stop reason: HOSPADM

## 2021-01-01 RX ORDER — IPRATROPIUM BROMIDE AND ALBUTEROL SULFATE 2.5; .5 MG/3ML; MG/3ML
3 SOLUTION RESPIRATORY (INHALATION)
Status: COMPLETED | OUTPATIENT
Start: 2021-01-01 | End: 2021-01-01

## 2021-01-01 RX ORDER — POLYETHYLENE GLYCOL 3350 17 G/17G
1 POWDER, FOR SOLUTION ORAL
Status: DISCONTINUED | OUTPATIENT
Start: 2021-01-01 | End: 2021-01-01

## 2021-01-01 RX ORDER — ONDANSETRON 4 MG/1
4 TABLET, ORALLY DISINTEGRATING ORAL EVERY 4 HOURS PRN
Status: DISCONTINUED | OUTPATIENT
Start: 2021-01-01 | End: 2021-01-01 | Stop reason: HOSPADM

## 2021-01-01 RX ORDER — GLYCOPYRROLATE 1 MG/1
1 TABLET ORAL 3 TIMES DAILY PRN
Status: DISCONTINUED | OUTPATIENT
Start: 2021-01-01 | End: 2021-01-01 | Stop reason: HOSPADM

## 2021-01-01 RX ORDER — SODIUM CHLORIDE 1 G/1
1 TABLET ORAL
Status: DISCONTINUED | OUTPATIENT
Start: 2021-01-01 | End: 2021-01-01

## 2021-01-01 RX ORDER — LISINOPRIL 5 MG/1
5 TABLET ORAL DAILY
COMMUNITY
End: 2021-01-01

## 2021-01-01 RX ORDER — HEPARIN SODIUM 5000 [USP'U]/ML
5000 INJECTION, SOLUTION INTRAVENOUS; SUBCUTANEOUS EVERY 8 HOURS
Status: DISCONTINUED | OUTPATIENT
Start: 2021-01-01 | End: 2021-01-01

## 2021-01-01 RX ORDER — LORAZEPAM 2 MG/ML
1 INJECTION INTRAMUSCULAR
Status: DISCONTINUED | OUTPATIENT
Start: 2021-01-01 | End: 2021-01-01 | Stop reason: HOSPADM

## 2021-01-01 RX ORDER — ATROPINE SULFATE 10 MG/ML
2 SOLUTION/ DROPS OPHTHALMIC EVERY 4 HOURS PRN
Status: DISCONTINUED | OUTPATIENT
Start: 2021-01-01 | End: 2021-01-01 | Stop reason: HOSPADM

## 2021-01-01 RX ORDER — DEXAMETHASONE 1 MG
2 TABLET ORAL EVERY 12 HOURS
Status: DISCONTINUED | OUTPATIENT
Start: 2021-01-01 | End: 2021-01-01 | Stop reason: HOSPADM

## 2021-01-01 RX ORDER — LORAZEPAM 2 MG/ML
1 CONCENTRATE ORAL
Status: DISCONTINUED | OUTPATIENT
Start: 2021-01-01 | End: 2021-01-01 | Stop reason: HOSPADM

## 2021-01-01 RX ORDER — MIDODRINE HYDROCHLORIDE 10 MG/1
10 TABLET ORAL 3 TIMES DAILY
Status: ON HOLD | COMMUNITY
End: 2021-01-01

## 2021-01-01 RX ORDER — LORAZEPAM 2 MG/ML
1 INJECTION INTRAMUSCULAR ONCE
Status: COMPLETED | OUTPATIENT
Start: 2021-01-01 | End: 2021-01-01

## 2021-01-01 RX ORDER — AMOXICILLIN 250 MG
1 CAPSULE ORAL NIGHTLY
Status: DISCONTINUED | OUTPATIENT
Start: 2021-01-01 | End: 2021-01-01 | Stop reason: HOSPADM

## 2021-01-01 RX ORDER — LABETALOL 200 MG/1
200 TABLET, FILM COATED ORAL EVERY 6 HOURS PRN
Status: DISCONTINUED | OUTPATIENT
Start: 2021-01-01 | End: 2021-01-01

## 2021-01-01 RX ORDER — DOCUSATE SODIUM 100 MG/1
100 CAPSULE, LIQUID FILLED ORAL 2 TIMES DAILY
Status: DISCONTINUED | OUTPATIENT
Start: 2021-01-01 | End: 2021-01-01 | Stop reason: HOSPADM

## 2021-01-01 RX ORDER — LEVOTHYROXINE SODIUM 0.1 MG/1
100 TABLET ORAL EVERY MORNING
Status: ON HOLD | COMMUNITY
End: 2021-01-01

## 2021-01-01 RX ORDER — FOLIC ACID 1 MG/1
1 TABLET ORAL DAILY
Status: DISCONTINUED | OUTPATIENT
Start: 2021-01-01 | End: 2021-01-01

## 2021-01-01 RX ORDER — ROPINIROLE 0.25 MG/1
0.25 TABLET, FILM COATED ORAL
Status: ON HOLD | COMMUNITY
End: 2021-01-01

## 2021-01-01 RX ORDER — LABETALOL HYDROCHLORIDE 5 MG/ML
10 INJECTION, SOLUTION INTRAVENOUS
Status: DISCONTINUED | OUTPATIENT
Start: 2021-01-01 | End: 2021-01-01

## 2021-01-01 RX ORDER — LIDOCAINE 50 MG/G
2 PATCH TOPICAL EVERY 24 HOURS
Status: DISCONTINUED | OUTPATIENT
Start: 2021-01-01 | End: 2021-01-01 | Stop reason: HOSPADM

## 2021-01-01 RX ORDER — ENALAPRILAT 1.25 MG/ML
1.25 INJECTION INTRAVENOUS EVERY 6 HOURS PRN
Status: DISCONTINUED | OUTPATIENT
Start: 2021-01-01 | End: 2021-01-01

## 2021-01-01 RX ORDER — ACETAMINOPHEN 325 MG/1
650 TABLET ORAL EVERY 6 HOURS
Status: DISPENSED | OUTPATIENT
Start: 2021-01-01 | End: 2021-01-01

## 2021-01-01 RX ORDER — CHOLECALCIFEROL (VITAMIN D3) 125 MCG
5 CAPSULE ORAL NIGHTLY PRN
Status: DISCONTINUED | OUTPATIENT
Start: 2021-01-01 | End: 2021-01-01

## 2021-01-01 RX ORDER — LEVETIRACETAM 500 MG/1
500 TABLET ORAL 2 TIMES DAILY
Status: DISCONTINUED | OUTPATIENT
Start: 2021-01-01 | End: 2021-01-01 | Stop reason: HOSPADM

## 2021-01-01 RX ORDER — LORAZEPAM 2 MG/ML
0.5 INJECTION INTRAMUSCULAR
Status: DISCONTINUED | OUTPATIENT
Start: 2021-01-01 | End: 2021-01-01

## 2021-01-01 RX ORDER — LORAZEPAM 2 MG/ML
3 INJECTION INTRAMUSCULAR
Status: DISCONTINUED | OUTPATIENT
Start: 2021-01-01 | End: 2021-01-01

## 2021-01-01 RX ORDER — LORAZEPAM 2 MG/ML
1.5 INJECTION INTRAMUSCULAR
Status: DISCONTINUED | OUTPATIENT
Start: 2021-01-01 | End: 2021-01-01

## 2021-01-01 RX ORDER — BISACODYL 10 MG
10 SUPPOSITORY, RECTAL RECTAL
Status: DISCONTINUED | OUTPATIENT
Start: 2021-01-01 | End: 2021-01-01

## 2021-01-01 RX ORDER — POTASSIUM CHLORIDE 20 MEQ/1
20 TABLET, EXTENDED RELEASE ORAL 2 TIMES DAILY
Status: DISCONTINUED | OUTPATIENT
Start: 2021-01-01 | End: 2021-01-01

## 2021-01-01 RX ORDER — FOLIC ACID 1 MG/1
1 TABLET ORAL DAILY
Status: ON HOLD
Start: 2021-01-01 | End: 2021-01-01

## 2021-01-01 RX ORDER — LORAZEPAM 2 MG/ML
4 INJECTION INTRAMUSCULAR
Status: DISCONTINUED | OUTPATIENT
Start: 2021-01-01 | End: 2021-01-01

## 2021-01-01 RX ORDER — GAUZE BANDAGE 2" X 2"
100 BANDAGE TOPICAL DAILY
Status: DISCONTINUED | OUTPATIENT
Start: 2021-01-01 | End: 2021-01-01 | Stop reason: HOSPADM

## 2021-01-01 RX ORDER — LIDOCAINE 50 MG/G
2 PATCH TOPICAL EVERY 24 HOURS
Status: ON HOLD
Start: 2021-01-01 | End: 2021-01-01

## 2021-01-01 RX ORDER — FLUDROCORTISONE ACETATE 0.1 MG/1
0.1 TABLET ORAL EVERY MORNING
Status: DISCONTINUED | OUTPATIENT
Start: 2021-01-01 | End: 2021-01-01

## 2021-01-01 RX ORDER — IPRATROPIUM BROMIDE AND ALBUTEROL SULFATE 2.5; .5 MG/3ML; MG/3ML
SOLUTION RESPIRATORY (INHALATION)
Status: COMPLETED
Start: 2021-01-01 | End: 2021-01-01

## 2021-01-01 RX ORDER — LORAZEPAM 2 MG/1
2 TABLET ORAL
Status: DISCONTINUED | OUTPATIENT
Start: 2021-01-01 | End: 2021-01-01

## 2021-01-01 RX ORDER — LORAZEPAM 1 MG/1
1 TABLET ORAL EVERY 4 HOURS PRN
Status: DISCONTINUED | OUTPATIENT
Start: 2021-01-01 | End: 2021-01-01

## 2021-01-01 RX ORDER — SODIUM CHLORIDE 1 G/1
2 TABLET ORAL
Status: DISCONTINUED | OUTPATIENT
Start: 2021-01-01 | End: 2021-01-01 | Stop reason: HOSPADM

## 2021-01-01 RX ORDER — GLYCOPYRROLATE 0.2 MG/ML
0.2 INJECTION INTRAMUSCULAR; INTRAVENOUS 3 TIMES DAILY PRN
Status: DISCONTINUED | OUTPATIENT
Start: 2021-01-01 | End: 2021-01-01 | Stop reason: HOSPADM

## 2021-01-01 RX ORDER — FOLIC ACID 1 MG/1
1 TABLET ORAL DAILY
Status: COMPLETED | OUTPATIENT
Start: 2021-01-01 | End: 2021-01-01

## 2021-01-01 RX ORDER — ACETAMINOPHEN 325 MG/1
650 TABLET ORAL EVERY 6 HOURS PRN
Status: DISCONTINUED | OUTPATIENT
Start: 2021-01-01 | End: 2021-01-01 | Stop reason: HOSPADM

## 2021-01-01 RX ORDER — MIDODRINE HYDROCHLORIDE 5 MG/1
10 TABLET ORAL 3 TIMES DAILY
Status: DISCONTINUED | OUTPATIENT
Start: 2021-01-01 | End: 2021-01-01

## 2021-01-01 RX ADMIN — ACETAMINOPHEN 650 MG: 325 TABLET ORAL at 00:14

## 2021-01-01 RX ADMIN — LORAZEPAM 1 MG: 2 INJECTION INTRAMUSCULAR; INTRAVENOUS at 23:05

## 2021-01-01 RX ADMIN — LEVETIRACETAM 500 MG: 500 TABLET ORAL at 05:31

## 2021-01-01 RX ADMIN — ROPINIROLE HYDROCHLORIDE 0.25 MG: 0.5 TABLET, FILM COATED ORAL at 20:26

## 2021-01-01 RX ADMIN — DEXAMETHASONE 2 MG: 1 TABLET ORAL at 17:36

## 2021-01-01 RX ADMIN — OXYCODONE 5 MG: 5 TABLET ORAL at 22:08

## 2021-01-01 RX ADMIN — LORAZEPAM 1 MG: 2 INJECTION INTRAMUSCULAR; INTRAVENOUS at 11:28

## 2021-01-01 RX ADMIN — LEVOTHYROXINE SODIUM 100 MCG: 0.1 TABLET ORAL at 05:52

## 2021-01-01 RX ADMIN — HYDROMORPHONE HYDROCHLORIDE 1 MG: 1 INJECTION, SOLUTION INTRAMUSCULAR; INTRAVENOUS; SUBCUTANEOUS at 18:29

## 2021-01-01 RX ADMIN — GLYCOPYRROLATE 0.2 MG: 0.2 INJECTION INTRAMUSCULAR; INTRAVENOUS at 22:24

## 2021-01-01 RX ADMIN — ACETAMINOPHEN 650 MG: 325 TABLET, FILM COATED ORAL at 21:37

## 2021-01-01 RX ADMIN — FAMOTIDINE 20 MG: 20 TABLET ORAL at 20:30

## 2021-01-01 RX ADMIN — HYDROMORPHONE HYDROCHLORIDE 0.5 MG: 1 INJECTION, SOLUTION INTRAMUSCULAR; INTRAVENOUS; SUBCUTANEOUS at 09:01

## 2021-01-01 RX ADMIN — LORAZEPAM 1 MG: 2 INJECTION INTRAMUSCULAR; INTRAVENOUS at 15:24

## 2021-01-01 RX ADMIN — ACETAMINOPHEN 650 MG: 325 TABLET ORAL at 00:26

## 2021-01-01 RX ADMIN — ACETAMINOPHEN 650 MG: 325 TABLET ORAL at 11:36

## 2021-01-01 RX ADMIN — LORAZEPAM 4 MG: 2 INJECTION INTRAMUSCULAR; INTRAVENOUS at 03:27

## 2021-01-01 RX ADMIN — LEVETIRACETAM 500 MG: 500 TABLET ORAL at 05:47

## 2021-01-01 RX ADMIN — HYDROMORPHONE HYDROCHLORIDE 0.5 MG: 1 INJECTION, SOLUTION INTRAMUSCULAR; INTRAVENOUS; SUBCUTANEOUS at 06:01

## 2021-01-01 RX ADMIN — HYDROMORPHONE HYDROCHLORIDE 1 MG: 1 INJECTION, SOLUTION INTRAMUSCULAR; INTRAVENOUS; SUBCUTANEOUS at 16:12

## 2021-01-01 RX ADMIN — HYDROMORPHONE HYDROCHLORIDE 1 MG: 1 INJECTION, SOLUTION INTRAMUSCULAR; INTRAVENOUS; SUBCUTANEOUS at 07:52

## 2021-01-01 RX ADMIN — POTASSIUM PHOSPHATE, MONOBASIC AND POTASSIUM PHOSPHATE, DIBASIC 30 MMOL: 224; 236 INJECTION, SOLUTION, CONCENTRATE INTRAVENOUS at 10:25

## 2021-01-01 RX ADMIN — Medication 100 MG: at 04:43

## 2021-01-01 RX ADMIN — HYDROMORPHONE HYDROCHLORIDE 0.5 MG: 1 INJECTION, SOLUTION INTRAMUSCULAR; INTRAVENOUS; SUBCUTANEOUS at 00:04

## 2021-01-01 RX ADMIN — ACETAMINOPHEN 650 MG: 325 TABLET ORAL at 05:17

## 2021-01-01 RX ADMIN — LORAZEPAM 1 MG: 1 TABLET ORAL at 15:56

## 2021-01-01 RX ADMIN — POTASSIUM CHLORIDE 20 MEQ: 1500 TABLET, EXTENDED RELEASE ORAL at 17:35

## 2021-01-01 RX ADMIN — HYDROMORPHONE HYDROCHLORIDE 1 MG: 1 INJECTION, SOLUTION INTRAMUSCULAR; INTRAVENOUS; SUBCUTANEOUS at 20:02

## 2021-01-01 RX ADMIN — LORAZEPAM 1 MG: 2 INJECTION INTRAMUSCULAR; INTRAVENOUS at 12:43

## 2021-01-01 RX ADMIN — LORAZEPAM 1 MG: 2 INJECTION INTRAMUSCULAR; INTRAVENOUS at 12:29

## 2021-01-01 RX ADMIN — IPRATROPIUM BROMIDE AND ALBUTEROL SULFATE 3 ML: 2.5; .5 SOLUTION RESPIRATORY (INHALATION) at 12:02

## 2021-01-01 RX ADMIN — DOCUSATE SODIUM 50 MG AND SENNOSIDES 8.6 MG 2 TABLET: 8.6; 5 TABLET, FILM COATED ORAL at 05:39

## 2021-01-01 RX ADMIN — SODIUM CHLORIDE: 9 INJECTION, SOLUTION INTRAVENOUS at 20:32

## 2021-01-01 RX ADMIN — LORAZEPAM 3 MG: 2 INJECTION INTRAMUSCULAR; INTRAVENOUS at 00:54

## 2021-01-01 RX ADMIN — LEVETIRACETAM 500 MG: 500 TABLET ORAL at 17:31

## 2021-01-01 RX ADMIN — Medication 2 G: at 09:03

## 2021-01-01 RX ADMIN — ATROPINE SULFATE 2 DROP: 10 SOLUTION OPHTHALMIC at 04:59

## 2021-01-01 RX ADMIN — THERA TABS 1 TABLET: TAB at 04:43

## 2021-01-01 RX ADMIN — LIDOCAINE 2 PATCH: 50 PATCH TOPICAL at 23:45

## 2021-01-01 RX ADMIN — FLUDROCORTISONE ACETATE 0.2 MG: 0.1 TABLET ORAL at 17:07

## 2021-01-01 RX ADMIN — LISINOPRIL 5 MG: 5 TABLET ORAL at 05:47

## 2021-01-01 RX ADMIN — ONDANSETRON 4 MG: 2 INJECTION INTRAMUSCULAR; INTRAVENOUS at 15:41

## 2021-01-01 RX ADMIN — DEXAMETHASONE 2 MG: 1 TABLET ORAL at 22:03

## 2021-01-01 RX ADMIN — FOLIC ACID 1 MG: 1 TABLET ORAL at 05:31

## 2021-01-01 RX ADMIN — HYDROMORPHONE HYDROCHLORIDE 1 MG: 1 INJECTION, SOLUTION INTRAMUSCULAR; INTRAVENOUS; SUBCUTANEOUS at 09:36

## 2021-01-01 RX ADMIN — MIDODRINE HYDROCHLORIDE 10 MG: 5 TABLET ORAL at 01:17

## 2021-01-01 RX ADMIN — LORAZEPAM 1 MG: 2 INJECTION INTRAMUSCULAR; INTRAVENOUS at 06:17

## 2021-01-01 RX ADMIN — SCOPALAMINE 1 PATCH: 1 PATCH, EXTENDED RELEASE TRANSDERMAL at 11:48

## 2021-01-01 RX ADMIN — HYDROMORPHONE HYDROCHLORIDE 0.5 MG: 1 INJECTION, SOLUTION INTRAMUSCULAR; INTRAVENOUS; SUBCUTANEOUS at 20:02

## 2021-01-01 RX ADMIN — POTASSIUM CHLORIDE 20 MEQ: 1500 TABLET, EXTENDED RELEASE ORAL at 10:38

## 2021-01-01 RX ADMIN — DEXAMETHASONE 2 MG: 1 TABLET ORAL at 05:31

## 2021-01-01 RX ADMIN — HYDROMORPHONE HYDROCHLORIDE 0.5 MG: 1 INJECTION, SOLUTION INTRAMUSCULAR; INTRAVENOUS; SUBCUTANEOUS at 20:28

## 2021-01-01 RX ADMIN — LEVETIRACETAM 500 MG: 500 TABLET ORAL at 04:44

## 2021-01-01 RX ADMIN — LIDOCAINE 2 PATCH: 50 PATCH TOPICAL at 23:26

## 2021-01-01 RX ADMIN — HYDROMORPHONE HYDROCHLORIDE 1 MG: 1 INJECTION, SOLUTION INTRAMUSCULAR; INTRAVENOUS; SUBCUTANEOUS at 13:28

## 2021-01-01 RX ADMIN — LORAZEPAM 1 MG: 2 INJECTION INTRAMUSCULAR; INTRAVENOUS at 09:28

## 2021-01-01 RX ADMIN — DEXAMETHASONE 2 MG: 1 TABLET ORAL at 05:18

## 2021-01-01 RX ADMIN — LORAZEPAM 1 MG: 2 INJECTION INTRAMUSCULAR; INTRAVENOUS at 07:34

## 2021-01-01 RX ADMIN — FOLIC ACID 1 MG: 1 TABLET ORAL at 05:47

## 2021-01-01 RX ADMIN — DEXAMETHASONE 2 MG: 1 TABLET ORAL at 05:28

## 2021-01-01 RX ADMIN — LORAZEPAM 1 MG: 2 INJECTION INTRAMUSCULAR; INTRAVENOUS at 20:03

## 2021-01-01 RX ADMIN — LORAZEPAM 1 MG: 2 INJECTION INTRAMUSCULAR; INTRAVENOUS at 05:14

## 2021-01-01 RX ADMIN — HYDROMORPHONE HYDROCHLORIDE 1 MG: 1 INJECTION, SOLUTION INTRAMUSCULAR; INTRAVENOUS; SUBCUTANEOUS at 05:01

## 2021-01-01 RX ADMIN — HYDROMORPHONE HYDROCHLORIDE 1 MG: 1 INJECTION, SOLUTION INTRAMUSCULAR; INTRAVENOUS; SUBCUTANEOUS at 01:40

## 2021-01-01 RX ADMIN — HYDROMORPHONE HYDROCHLORIDE 1 MG: 1 INJECTION, SOLUTION INTRAMUSCULAR; INTRAVENOUS; SUBCUTANEOUS at 00:23

## 2021-01-01 RX ADMIN — IPRATROPIUM BROMIDE AND ALBUTEROL SULFATE 3 ML: .5; 3 SOLUTION RESPIRATORY (INHALATION) at 12:02

## 2021-01-01 RX ADMIN — ACETAMINOPHEN 650 MG: 325 TABLET ORAL at 11:50

## 2021-01-01 RX ADMIN — FUROSEMIDE 80 MG: 10 INJECTION, SOLUTION INTRAMUSCULAR; INTRAVENOUS at 01:20

## 2021-01-01 RX ADMIN — ATROPINE SULFATE 2 DROP: 10 SOLUTION OPHTHALMIC at 08:36

## 2021-01-01 RX ADMIN — LORAZEPAM 1 MG: 2 INJECTION INTRAMUSCULAR; INTRAVENOUS at 11:35

## 2021-01-01 RX ADMIN — LORAZEPAM 1 MG: 2 INJECTION INTRAMUSCULAR; INTRAVENOUS at 00:31

## 2021-01-01 RX ADMIN — DEXAMETHASONE 2 MG: 1 TABLET ORAL at 05:53

## 2021-01-01 RX ADMIN — LEVETIRACETAM 500 MG: 500 TABLET ORAL at 05:17

## 2021-01-01 RX ADMIN — FAMOTIDINE 20 MG: 20 TABLET ORAL at 17:39

## 2021-01-01 RX ADMIN — FLUDROCORTISONE ACETATE 0.2 MG: 0.1 TABLET ORAL at 04:44

## 2021-01-01 RX ADMIN — LORAZEPAM 1 MG: 2 INJECTION INTRAMUSCULAR; INTRAVENOUS at 03:02

## 2021-01-01 RX ADMIN — FAMOTIDINE 20 MG: 10 INJECTION INTRAVENOUS at 05:17

## 2021-01-01 RX ADMIN — LORAZEPAM 1 MG: 2 INJECTION INTRAMUSCULAR; INTRAVENOUS at 06:01

## 2021-01-01 RX ADMIN — LORAZEPAM 1 MG: 2 INJECTION INTRAMUSCULAR; INTRAVENOUS at 03:49

## 2021-01-01 RX ADMIN — LORAZEPAM 1 MG: 2 INJECTION INTRAMUSCULAR; INTRAVENOUS at 17:16

## 2021-01-01 RX ADMIN — Medication 100 MG: at 05:26

## 2021-01-01 RX ADMIN — HYDROMORPHONE HYDROCHLORIDE 1 MG: 1 INJECTION, SOLUTION INTRAMUSCULAR; INTRAVENOUS; SUBCUTANEOUS at 11:24

## 2021-01-01 RX ADMIN — THERA TABS 1 TABLET: TAB at 05:17

## 2021-01-01 RX ADMIN — MAGNESIUM SULFATE 2 G: 2 INJECTION INTRAVENOUS at 07:43

## 2021-01-01 RX ADMIN — THIAMINE HYDROCHLORIDE: 100 INJECTION, SOLUTION INTRAMUSCULAR; INTRAVENOUS at 15:30

## 2021-01-01 RX ADMIN — HYDROMORPHONE HYDROCHLORIDE 1 MG: 1 INJECTION, SOLUTION INTRAMUSCULAR; INTRAVENOUS; SUBCUTANEOUS at 21:56

## 2021-01-01 RX ADMIN — ATROPINE SULFATE 2 DROP: 10 SOLUTION OPHTHALMIC at 18:36

## 2021-01-01 RX ADMIN — ACETAMINOPHEN 650 MG: 325 TABLET ORAL at 18:08

## 2021-01-01 RX ADMIN — DOCUSATE SODIUM 50 MG AND SENNOSIDES 8.6 MG 1 TABLET: 8.6; 5 TABLET, FILM COATED ORAL at 20:13

## 2021-01-01 RX ADMIN — HYDROMORPHONE HYDROCHLORIDE 1 MG: 1 INJECTION, SOLUTION INTRAMUSCULAR; INTRAVENOUS; SUBCUTANEOUS at 06:20

## 2021-01-01 RX ADMIN — DEXAMETHASONE 2 MG: 1 TABLET ORAL at 17:32

## 2021-01-01 RX ADMIN — TRAMADOL HYDROCHLORIDE 25 MG: 50 TABLET ORAL at 19:42

## 2021-01-01 RX ADMIN — HYDROMORPHONE HYDROCHLORIDE 1 MG: 1 INJECTION, SOLUTION INTRAMUSCULAR; INTRAVENOUS; SUBCUTANEOUS at 13:59

## 2021-01-01 RX ADMIN — LISINOPRIL 5 MG: 5 TABLET ORAL at 04:44

## 2021-01-01 RX ADMIN — THERA TABS 1 TABLET: TAB at 06:16

## 2021-01-01 RX ADMIN — GADOTERIDOL 20 ML: 279.3 INJECTION, SOLUTION INTRAVENOUS at 13:34

## 2021-01-01 RX ADMIN — HYDROMORPHONE HYDROCHLORIDE 1 MG: 1 INJECTION, SOLUTION INTRAMUSCULAR; INTRAVENOUS; SUBCUTANEOUS at 10:26

## 2021-01-01 RX ADMIN — TRAMADOL HYDROCHLORIDE 25 MG: 50 TABLET ORAL at 03:27

## 2021-01-01 RX ADMIN — LEVETIRACETAM 500 MG: 500 TABLET ORAL at 17:39

## 2021-01-01 RX ADMIN — ACETAMINOPHEN 650 MG: 325 TABLET ORAL at 05:52

## 2021-01-01 RX ADMIN — LEVOTHYROXINE SODIUM 100 MCG: 0.1 TABLET ORAL at 05:26

## 2021-01-01 RX ADMIN — POLYETHYLENE GLYCOL 3350 1 PACKET: 17 POWDER, FOR SOLUTION ORAL at 17:39

## 2021-01-01 RX ADMIN — POTASSIUM PHOSPHATE, MONOBASIC AND POTASSIUM PHOSPHATE, DIBASIC 30 MMOL: 224; 236 INJECTION, SOLUTION, CONCENTRATE INTRAVENOUS at 16:15

## 2021-01-01 RX ADMIN — LORAZEPAM 2 MG: 2 INJECTION INTRAMUSCULAR; INTRAVENOUS at 23:46

## 2021-01-01 RX ADMIN — HYDROMORPHONE HYDROCHLORIDE 1 MG: 1 INJECTION, SOLUTION INTRAMUSCULAR; INTRAVENOUS; SUBCUTANEOUS at 23:27

## 2021-01-01 RX ADMIN — LORAZEPAM 1 MG: 2 INJECTION INTRAMUSCULAR; INTRAVENOUS at 11:16

## 2021-01-01 RX ADMIN — LORAZEPAM 1 MG: 2 INJECTION INTRAMUSCULAR; INTRAVENOUS at 12:45

## 2021-01-01 RX ADMIN — ATROPINE SULFATE 2 DROP: 10 SOLUTION OPHTHALMIC at 10:17

## 2021-01-01 RX ADMIN — GLYCOPYRROLATE 0.2 MG: 0.2 INJECTION INTRAMUSCULAR; INTRAVENOUS at 21:02

## 2021-01-01 RX ADMIN — ATROPINE SULFATE 2 DROP: 10 SOLUTION OPHTHALMIC at 21:37

## 2021-01-01 RX ADMIN — POTASSIUM CHLORIDE 20 MEQ: 1500 TABLET, EXTENDED RELEASE ORAL at 17:39

## 2021-01-01 RX ADMIN — IOHEXOL 90 ML: 350 INJECTION, SOLUTION INTRAVENOUS at 16:42

## 2021-01-01 RX ADMIN — LORAZEPAM 3 MG: 2 INJECTION INTRAMUSCULAR; INTRAVENOUS at 20:13

## 2021-01-01 RX ADMIN — LEVETIRACETAM 500 MG: 500 TABLET ORAL at 18:09

## 2021-01-01 RX ADMIN — THIAMINE HYDROCHLORIDE: 100 INJECTION, SOLUTION INTRAMUSCULAR; INTRAVENOUS at 16:56

## 2021-01-01 RX ADMIN — HYDROMORPHONE HYDROCHLORIDE 1 MG: 1 INJECTION, SOLUTION INTRAMUSCULAR; INTRAVENOUS; SUBCUTANEOUS at 09:37

## 2021-01-01 RX ADMIN — HYDROMORPHONE HYDROCHLORIDE 1 MG: 1 INJECTION, SOLUTION INTRAMUSCULAR; INTRAVENOUS; SUBCUTANEOUS at 12:45

## 2021-01-01 RX ADMIN — LORAZEPAM 1 MG: 2 INJECTION INTRAMUSCULAR; INTRAVENOUS at 20:58

## 2021-01-01 RX ADMIN — HYDROMORPHONE HYDROCHLORIDE 0.5 MG: 1 INJECTION, SOLUTION INTRAMUSCULAR; INTRAVENOUS; SUBCUTANEOUS at 05:12

## 2021-01-01 RX ADMIN — POTASSIUM CHLORIDE 20 MEQ: 1500 TABLET, EXTENDED RELEASE ORAL at 17:31

## 2021-01-01 RX ADMIN — DOCUSATE SODIUM 100 MG: 100 CAPSULE, LIQUID FILLED ORAL at 05:17

## 2021-01-01 RX ADMIN — HYDROMORPHONE HYDROCHLORIDE 1 MG: 1 INJECTION, SOLUTION INTRAMUSCULAR; INTRAVENOUS; SUBCUTANEOUS at 12:36

## 2021-01-01 RX ADMIN — LEVETIRACETAM 500 MG: 500 TABLET ORAL at 17:36

## 2021-01-01 RX ADMIN — THIAMINE HYDROCHLORIDE 100 MG: 100 INJECTION, SOLUTION INTRAMUSCULAR; INTRAVENOUS at 05:53

## 2021-01-01 RX ADMIN — Medication 1 G: at 15:57

## 2021-01-01 RX ADMIN — FOLIC ACID 1 MG: 1 TABLET ORAL at 05:17

## 2021-01-01 RX ADMIN — LEVOTHYROXINE SODIUM 75 MCG: 0.07 TABLET ORAL at 06:16

## 2021-01-01 RX ADMIN — HYDROMORPHONE HYDROCHLORIDE 1 MG: 1 INJECTION, SOLUTION INTRAMUSCULAR; INTRAVENOUS; SUBCUTANEOUS at 23:13

## 2021-01-01 RX ADMIN — ATROPINE SULFATE 2 DROP: 10 SOLUTION OPHTHALMIC at 13:37

## 2021-01-01 RX ADMIN — HYDROMORPHONE HYDROCHLORIDE 0.5 MG: 1 INJECTION, SOLUTION INTRAMUSCULAR; INTRAVENOUS; SUBCUTANEOUS at 06:07

## 2021-01-01 RX ADMIN — HYDROMORPHONE HYDROCHLORIDE 0.5 MG: 1 INJECTION, SOLUTION INTRAMUSCULAR; INTRAVENOUS; SUBCUTANEOUS at 09:50

## 2021-01-01 RX ADMIN — FOLIC ACID 1 MG: 1 TABLET ORAL at 04:43

## 2021-01-01 RX ADMIN — LORAZEPAM 3 MG: 2 INJECTION INTRAMUSCULAR; INTRAVENOUS at 01:13

## 2021-01-01 RX ADMIN — LORAZEPAM 2 MG: 2 INJECTION INTRAMUSCULAR; INTRAVENOUS at 08:53

## 2021-01-01 RX ADMIN — TRAMADOL HYDROCHLORIDE 25 MG: 50 TABLET ORAL at 09:33

## 2021-01-01 RX ADMIN — HYDROMORPHONE HYDROCHLORIDE 0.5 MG: 1 INJECTION, SOLUTION INTRAMUSCULAR; INTRAVENOUS; SUBCUTANEOUS at 21:35

## 2021-01-01 RX ADMIN — HYDROMORPHONE HYDROCHLORIDE 1 MG: 1 INJECTION, SOLUTION INTRAMUSCULAR; INTRAVENOUS; SUBCUTANEOUS at 14:38

## 2021-01-01 RX ADMIN — LISINOPRIL 5 MG: 5 TABLET ORAL at 05:52

## 2021-01-01 RX ADMIN — LORAZEPAM 1 MG: 2 INJECTION INTRAMUSCULAR; INTRAVENOUS at 09:55

## 2021-01-01 RX ADMIN — LISINOPRIL 5 MG: 5 TABLET ORAL at 05:32

## 2021-01-01 RX ADMIN — Medication 1 G: at 17:36

## 2021-01-01 RX ADMIN — LORAZEPAM 1 MG: 2 INJECTION INTRAMUSCULAR; INTRAVENOUS at 00:14

## 2021-01-01 RX ADMIN — ACETAMINOPHEN 650 MG: 325 TABLET ORAL at 17:31

## 2021-01-01 RX ADMIN — LIDOCAINE 2 PATCH: 50 PATCH TOPICAL at 22:50

## 2021-01-01 RX ADMIN — HYDROMORPHONE HYDROCHLORIDE 0.5 MG: 1 INJECTION, SOLUTION INTRAMUSCULAR; INTRAVENOUS; SUBCUTANEOUS at 18:05

## 2021-01-01 RX ADMIN — Medication 100 MG: at 05:31

## 2021-01-01 RX ADMIN — ACETAMINOPHEN 650 MG: 325 TABLET ORAL at 23:04

## 2021-01-01 RX ADMIN — LORAZEPAM 1 MG: 2 INJECTION INTRAMUSCULAR; INTRAVENOUS at 22:24

## 2021-01-01 RX ADMIN — HYDROMORPHONE HYDROCHLORIDE 1 MG: 1 INJECTION, SOLUTION INTRAMUSCULAR; INTRAVENOUS; SUBCUTANEOUS at 19:55

## 2021-01-01 RX ADMIN — HYDROMORPHONE HYDROCHLORIDE 1 MG: 1 INJECTION, SOLUTION INTRAMUSCULAR; INTRAVENOUS; SUBCUTANEOUS at 21:37

## 2021-01-01 RX ADMIN — FLUDROCORTISONE ACETATE 0.1 MG: 0.1 TABLET ORAL at 09:20

## 2021-01-01 RX ADMIN — HYDROMORPHONE HYDROCHLORIDE 0.5 MG: 1 INJECTION, SOLUTION INTRAMUSCULAR; INTRAVENOUS; SUBCUTANEOUS at 03:17

## 2021-01-01 RX ADMIN — HYDROMORPHONE HYDROCHLORIDE 1 MG: 1 INJECTION, SOLUTION INTRAMUSCULAR; INTRAVENOUS; SUBCUTANEOUS at 16:24

## 2021-01-01 RX ADMIN — LORAZEPAM 1 MG: 2 INJECTION INTRAMUSCULAR; INTRAVENOUS at 17:55

## 2021-01-01 RX ADMIN — LEVETIRACETAM 500 MG: 500 TABLET ORAL at 05:52

## 2021-01-01 RX ADMIN — FAMOTIDINE 20 MG: 20 TABLET ORAL at 05:26

## 2021-01-01 RX ADMIN — SODIUM PHOSPHATE, MONOBASIC, MONOHYDRATE AND SODIUM PHOSPHATE, DIBASIC, ANHYDROUS 30 MMOL: 276; 142 INJECTION, SOLUTION INTRAVENOUS at 11:30

## 2021-01-01 RX ADMIN — MAGNESIUM SULFATE IN WATER 2 G: 40 INJECTION, SOLUTION INTRAVENOUS at 10:38

## 2021-01-01 RX ADMIN — Medication 100 MG: at 09:24

## 2021-01-01 RX ADMIN — IOHEXOL 55 ML: 350 INJECTION, SOLUTION INTRAVENOUS at 00:37

## 2021-01-01 RX ADMIN — DEXAMETHASONE 2 MG: 1 TABLET ORAL at 04:44

## 2021-01-01 RX ADMIN — LORAZEPAM 1 MG: 2 INJECTION INTRAMUSCULAR; INTRAVENOUS at 13:29

## 2021-01-01 RX ADMIN — LORAZEPAM 4 MG: 2 INJECTION INTRAMUSCULAR; INTRAVENOUS at 10:16

## 2021-01-01 RX ADMIN — HYDROMORPHONE HYDROCHLORIDE 1 MG: 1 INJECTION, SOLUTION INTRAMUSCULAR; INTRAVENOUS; SUBCUTANEOUS at 12:07

## 2021-01-01 RX ADMIN — LORAZEPAM 1 MG: 2 INJECTION INTRAMUSCULAR; INTRAVENOUS at 02:28

## 2021-01-01 RX ADMIN — LORAZEPAM 1 MG: 2 INJECTION INTRAMUSCULAR; INTRAVENOUS at 05:18

## 2021-01-01 RX ADMIN — HYDROMORPHONE HYDROCHLORIDE 0.5 MG: 1 INJECTION, SOLUTION INTRAMUSCULAR; INTRAVENOUS; SUBCUTANEOUS at 17:54

## 2021-01-01 RX ADMIN — LORAZEPAM 1 MG: 2 INJECTION INTRAMUSCULAR; INTRAVENOUS at 22:33

## 2021-01-01 RX ADMIN — FOLIC ACID 1 MG: 1 TABLET ORAL at 05:26

## 2021-01-01 RX ADMIN — LORAZEPAM 1 MG: 2 INJECTION INTRAMUSCULAR; INTRAVENOUS at 18:05

## 2021-01-01 RX ADMIN — DEXAMETHASONE 2 MG: 1 TABLET ORAL at 17:07

## 2021-01-01 RX ADMIN — HYDROMORPHONE HYDROCHLORIDE 1 MG: 1 INJECTION, SOLUTION INTRAMUSCULAR; INTRAVENOUS; SUBCUTANEOUS at 04:10

## 2021-01-01 RX ADMIN — LORAZEPAM 1 MG: 2 INJECTION INTRAMUSCULAR; INTRAVENOUS at 08:35

## 2021-01-01 RX ADMIN — LORAZEPAM 1 MG: 2 INJECTION INTRAMUSCULAR; INTRAVENOUS at 13:36

## 2021-01-01 RX ADMIN — HYDROMORPHONE HYDROCHLORIDE 1 MG: 1 INJECTION, SOLUTION INTRAMUSCULAR; INTRAVENOUS; SUBCUTANEOUS at 16:30

## 2021-01-01 RX ADMIN — LIDOCAINE 2 PATCH: 50 PATCH TOPICAL at 23:04

## 2021-01-01 RX ADMIN — Medication 100 MG: at 05:17

## 2021-01-01 RX ADMIN — THERA TABS 1 TABLET: TAB at 09:24

## 2021-01-01 RX ADMIN — DEXAMETHASONE 2 MG: 1 TABLET ORAL at 05:29

## 2021-01-01 RX ADMIN — DOCUSATE SODIUM 100 MG: 100 CAPSULE, LIQUID FILLED ORAL at 05:29

## 2021-01-01 RX ADMIN — FOLIC ACID 1 MG: 1 TABLET ORAL at 05:39

## 2021-01-01 RX ADMIN — LORAZEPAM 2 MG: 2 INJECTION INTRAMUSCULAR; INTRAVENOUS at 20:33

## 2021-01-01 RX ADMIN — HYDROMORPHONE HYDROCHLORIDE 1 MG: 1 INJECTION, SOLUTION INTRAMUSCULAR; INTRAVENOUS; SUBCUTANEOUS at 11:23

## 2021-01-01 RX ADMIN — ACETAMINOPHEN 650 MG: 325 TABLET ORAL at 23:45

## 2021-01-01 RX ADMIN — LEVOTHYROXINE SODIUM 75 MCG: 0.07 TABLET ORAL at 05:39

## 2021-01-01 RX ADMIN — LEVETIRACETAM 500 MG: 500 TABLET ORAL at 05:26

## 2021-01-01 RX ADMIN — THERA TABS 1 TABLET: TAB at 05:39

## 2021-01-01 RX ADMIN — LEVETIRACETAM 500 MG: 500 TABLET ORAL at 17:08

## 2021-01-01 RX ADMIN — LEVOTHYROXINE SODIUM 100 MCG: 0.1 TABLET ORAL at 05:17

## 2021-01-01 RX ADMIN — LISINOPRIL 5 MG: 5 TABLET ORAL at 06:23

## 2021-01-01 RX ADMIN — LEVOTHYROXINE SODIUM 100 MCG: 0.1 TABLET ORAL at 05:41

## 2021-01-01 RX ADMIN — THERA TABS 1 TABLET: TAB at 05:31

## 2021-01-01 RX ADMIN — DEXAMETHASONE 2 MG: 1 TABLET ORAL at 05:49

## 2021-01-01 RX ADMIN — ACETAMINOPHEN 650 MG: 325 TABLET ORAL at 05:29

## 2021-01-01 RX ADMIN — HYDROMORPHONE HYDROCHLORIDE 1 MG: 1 INJECTION, SOLUTION INTRAMUSCULAR; INTRAVENOUS; SUBCUTANEOUS at 19:39

## 2021-01-01 RX ADMIN — HYDROMORPHONE HYDROCHLORIDE 1 MG: 1 INJECTION, SOLUTION INTRAMUSCULAR; INTRAVENOUS; SUBCUTANEOUS at 18:13

## 2021-01-01 RX ADMIN — HYDROMORPHONE HYDROCHLORIDE 1 MG: 1 INJECTION, SOLUTION INTRAMUSCULAR; INTRAVENOUS; SUBCUTANEOUS at 23:12

## 2021-01-01 RX ADMIN — POTASSIUM CHLORIDE 20 MEQ: 1500 TABLET, EXTENDED RELEASE ORAL at 05:48

## 2021-01-01 RX ADMIN — TRAMADOL HYDROCHLORIDE 25 MG: 50 TABLET ORAL at 13:10

## 2021-01-01 RX ADMIN — LIDOCAINE 2 PATCH: 50 PATCH TOPICAL at 05:30

## 2021-01-01 RX ADMIN — HYDROMORPHONE HYDROCHLORIDE 0.5 MG: 1 INJECTION, SOLUTION INTRAMUSCULAR; INTRAVENOUS; SUBCUTANEOUS at 01:53

## 2021-01-01 RX ADMIN — ATROPINE SULFATE 2 DROP: 10 SOLUTION OPHTHALMIC at 19:47

## 2021-01-01 RX ADMIN — LORAZEPAM 1 MG: 2 INJECTION INTRAMUSCULAR; INTRAVENOUS at 17:35

## 2021-01-01 RX ADMIN — ATROPINE SULFATE 2 DROP: 10 SOLUTION OPHTHALMIC at 12:56

## 2021-01-01 RX ADMIN — HYDROMORPHONE HYDROCHLORIDE 1 MG: 1 INJECTION, SOLUTION INTRAMUSCULAR; INTRAVENOUS; SUBCUTANEOUS at 01:46

## 2021-01-01 RX ADMIN — LORAZEPAM 1 MG: 2 INJECTION INTRAMUSCULAR; INTRAVENOUS at 07:07

## 2021-01-01 RX ADMIN — ACETAMINOPHEN 650 MG: 325 TABLET ORAL at 17:39

## 2021-01-01 RX ADMIN — HYDROMORPHONE HYDROCHLORIDE 1 MG: 1 INJECTION, SOLUTION INTRAMUSCULAR; INTRAVENOUS; SUBCUTANEOUS at 20:43

## 2021-01-01 RX ADMIN — LORAZEPAM 1 MG: 2 INJECTION INTRAMUSCULAR; INTRAVENOUS at 19:42

## 2021-01-01 RX ADMIN — HYDROMORPHONE HYDROCHLORIDE 0.5 MG: 1 INJECTION, SOLUTION INTRAMUSCULAR; INTRAVENOUS; SUBCUTANEOUS at 14:31

## 2021-01-01 RX ADMIN — ACETAMINOPHEN 650 MG: 325 TABLET ORAL at 11:30

## 2021-01-01 RX ADMIN — LEVOTHYROXINE SODIUM 100 MCG: 0.1 TABLET ORAL at 04:44

## 2021-01-01 RX ADMIN — ATROPINE SULFATE 2 DROP: 10 SOLUTION OPHTHALMIC at 06:21

## 2021-01-01 RX ADMIN — GLYCOPYRROLATE 0.2 MG: 0.2 INJECTION INTRAMUSCULAR; INTRAVENOUS at 13:36

## 2021-01-01 RX ADMIN — HYDROMORPHONE HYDROCHLORIDE 1 MG: 1 INJECTION, SOLUTION INTRAMUSCULAR; INTRAVENOUS; SUBCUTANEOUS at 10:16

## 2021-01-01 RX ADMIN — LEVOTHYROXINE SODIUM 100 MCG: 0.1 TABLET ORAL at 05:48

## 2021-01-01 RX ADMIN — LISINOPRIL 5 MG: 5 TABLET ORAL at 05:29

## 2021-01-01 RX ADMIN — LORAZEPAM 1 MG: 2 INJECTION INTRAMUSCULAR; INTRAVENOUS at 10:43

## 2021-01-01 RX ADMIN — LORAZEPAM 1 MG: 2 INJECTION INTRAMUSCULAR; INTRAVENOUS at 22:01

## 2021-01-01 RX ADMIN — POTASSIUM CHLORIDE 20 MEQ: 1500 TABLET, EXTENDED RELEASE ORAL at 07:51

## 2021-01-01 RX ADMIN — FOLIC ACID 1 MG: 1 TABLET ORAL at 06:16

## 2021-01-01 RX ADMIN — HYDROMORPHONE HYDROCHLORIDE 1 MG: 1 INJECTION, SOLUTION INTRAMUSCULAR; INTRAVENOUS; SUBCUTANEOUS at 21:02

## 2021-01-01 RX ADMIN — DOCUSATE SODIUM 100 MG: 100 CAPSULE, LIQUID FILLED ORAL at 17:39

## 2021-01-01 RX ADMIN — POTASSIUM CHLORIDE 20 MEQ: 1500 TABLET, EXTENDED RELEASE ORAL at 07:43

## 2021-01-01 RX ADMIN — HYDROMORPHONE HYDROCHLORIDE 1 MG: 1 INJECTION, SOLUTION INTRAMUSCULAR; INTRAVENOUS; SUBCUTANEOUS at 04:57

## 2021-01-01 RX ADMIN — ONDANSETRON 4 MG: 2 INJECTION INTRAMUSCULAR; INTRAVENOUS at 19:55

## 2021-01-01 RX ADMIN — THERA TABS 1 TABLET: TAB at 05:52

## 2021-01-01 RX ADMIN — ATROPINE SULFATE 2 DROP: 10 SOLUTION OPHTHALMIC at 02:00

## 2021-01-01 RX ADMIN — LEVOTHYROXINE SODIUM 100 MCG: 0.1 TABLET ORAL at 05:29

## 2021-01-01 RX ADMIN — LORAZEPAM 1 MG: 2 INJECTION INTRAMUSCULAR; INTRAVENOUS at 14:41

## 2021-01-01 RX ADMIN — ACETAMINOPHEN 650 MG: 325 TABLET, FILM COATED ORAL at 04:43

## 2021-01-01 RX ADMIN — Medication 1 G: at 09:20

## 2021-01-01 RX ADMIN — HYDROMORPHONE HYDROCHLORIDE 0.5 MG: 1 INJECTION, SOLUTION INTRAMUSCULAR; INTRAVENOUS; SUBCUTANEOUS at 09:55

## 2021-01-01 RX ADMIN — HYDROMORPHONE HYDROCHLORIDE 1 MG: 1 INJECTION, SOLUTION INTRAMUSCULAR; INTRAVENOUS; SUBCUTANEOUS at 08:35

## 2021-01-01 RX ADMIN — FOLIC ACID 1 MG: 1 TABLET ORAL at 05:52

## 2021-01-01 RX ADMIN — LORAZEPAM 1 MG: 2 INJECTION INTRAMUSCULAR; INTRAVENOUS at 09:36

## 2021-01-01 RX ADMIN — HYDROMORPHONE HYDROCHLORIDE 1 MG: 1 INJECTION, SOLUTION INTRAMUSCULAR; INTRAVENOUS; SUBCUTANEOUS at 14:46

## 2021-01-01 RX ADMIN — THERA TABS 1 TABLET: TAB at 05:26

## 2021-01-01 RX ADMIN — LORAZEPAM 1 MG: 2 INJECTION INTRAMUSCULAR; INTRAVENOUS at 10:15

## 2021-01-01 RX ADMIN — ACETAMINOPHEN 650 MG: 325 TABLET ORAL at 05:47

## 2021-01-01 RX ADMIN — ACETAMINOPHEN 650 MG: 325 TABLET ORAL at 17:35

## 2021-01-01 RX ADMIN — ACETAMINOPHEN 650 MG: 325 TABLET ORAL at 23:26

## 2021-01-01 RX ADMIN — ACETAMINOPHEN 650 MG: 325 TABLET ORAL at 11:27

## 2021-01-01 RX ADMIN — THERA TABS 1 TABLET: TAB at 05:47

## 2021-01-01 RX ADMIN — ACETAMINOPHEN 650 MG: 325 TABLET ORAL at 05:26

## 2021-01-01 RX ADMIN — LIDOCAINE 2 PATCH: 50 PATCH TOPICAL at 00:25

## 2021-01-01 RX ADMIN — HYDROMORPHONE HYDROCHLORIDE 0.5 MG: 1 INJECTION, SOLUTION INTRAMUSCULAR; INTRAVENOUS; SUBCUTANEOUS at 01:42

## 2021-01-01 RX ADMIN — HYDROMORPHONE HYDROCHLORIDE 1 MG: 1 INJECTION, SOLUTION INTRAMUSCULAR; INTRAVENOUS; SUBCUTANEOUS at 01:57

## 2021-01-01 RX ADMIN — Medication 100 MG: at 05:52

## 2021-01-01 RX ADMIN — FOLIC ACID 1 MG: 1 TABLET ORAL at 09:20

## 2021-01-01 RX ADMIN — LORAZEPAM 1 MG: 2 INJECTION INTRAMUSCULAR; INTRAVENOUS at 09:01

## 2021-01-01 RX ADMIN — HYDROMORPHONE HYDROCHLORIDE 1 MG: 1 INJECTION, SOLUTION INTRAMUSCULAR; INTRAVENOUS; SUBCUTANEOUS at 11:26

## 2021-01-01 RX ADMIN — MAGNESIUM SULFATE 2 G: 2 INJECTION INTRAVENOUS at 11:30

## 2021-01-01 RX ADMIN — DOCUSATE SODIUM 50 MG AND SENNOSIDES 8.6 MG 2 TABLET: 8.6; 5 TABLET, FILM COATED ORAL at 06:16

## 2021-01-01 RX ADMIN — LEVETIRACETAM 500 MG: 500 TABLET ORAL at 05:29

## 2021-01-01 RX ADMIN — SCOPALAMINE 1 PATCH: 1 PATCH, EXTENDED RELEASE TRANSDERMAL at 11:56

## 2021-01-01 RX ADMIN — GLYCOPYRROLATE 0.2 MG: 0.2 INJECTION INTRAMUSCULAR; INTRAVENOUS at 09:36

## 2021-01-01 RX ADMIN — MAGNESIUM SULFATE 2 G: 2 INJECTION INTRAVENOUS at 15:47

## 2021-01-01 RX ADMIN — FLUDROCORTISONE ACETATE 0.1 MG: 0.1 TABLET ORAL at 05:31

## 2021-01-01 RX ADMIN — ATROPINE SULFATE 2 DROP: 10 SOLUTION OPHTHALMIC at 00:29

## 2021-01-01 RX ADMIN — HYDROMORPHONE HYDROCHLORIDE 0.5 MG: 1 INJECTION, SOLUTION INTRAMUSCULAR; INTRAVENOUS; SUBCUTANEOUS at 07:07

## 2021-01-01 RX ADMIN — LEVETIRACETAM 500 MG: 500 TABLET ORAL at 17:35

## 2021-01-01 RX ADMIN — THIAMINE HYDROCHLORIDE 100 MG: 100 INJECTION, SOLUTION INTRAMUSCULAR; INTRAVENOUS at 06:16

## 2021-01-01 RX ADMIN — HYDROMORPHONE HYDROCHLORIDE 1 MG: 1 INJECTION, SOLUTION INTRAMUSCULAR; INTRAVENOUS; SUBCUTANEOUS at 17:29

## 2021-01-01 RX ADMIN — ONDANSETRON 4 MG: 2 INJECTION INTRAMUSCULAR; INTRAVENOUS at 08:33

## 2021-01-01 RX ADMIN — DEXAMETHASONE 2 MG: 1 TABLET ORAL at 17:41

## 2021-01-01 RX ADMIN — LORAZEPAM 3 MG: 2 INJECTION INTRAMUSCULAR; INTRAVENOUS at 04:44

## 2021-01-01 RX ADMIN — HYDROMORPHONE HYDROCHLORIDE 0.5 MG: 1 INJECTION, SOLUTION INTRAMUSCULAR; INTRAVENOUS; SUBCUTANEOUS at 14:45

## 2021-01-01 RX ADMIN — Medication 100 MG: at 05:48

## 2021-01-01 RX ADMIN — DOCUSATE SODIUM 50 MG AND SENNOSIDES 8.6 MG 2 TABLET: 8.6; 5 TABLET, FILM COATED ORAL at 17:30

## 2021-01-01 ASSESSMENT — COGNITIVE AND FUNCTIONAL STATUS - GENERAL
HELP NEEDED FOR BATHING: A LOT
DRESSING REGULAR UPPER BODY CLOTHING: A LITTLE
PERSONAL GROOMING: TOTAL
SUGGESTED CMS G CODE MODIFIER DAILY ACTIVITY: CK
PERSONAL GROOMING: A LITTLE
DRESSING REGULAR UPPER BODY CLOTHING: TOTAL
DAILY ACTIVITIY SCORE: 8
STANDING UP FROM CHAIR USING ARMS: TOTAL
TOILETING: TOTAL
SUGGESTED CMS G CODE MODIFIER DAILY ACTIVITY: CK
DRESSING REGULAR UPPER BODY CLOTHING: A LITTLE
HELP NEEDED FOR BATHING: A LOT
SUGGESTED CMS G CODE MODIFIER DAILY ACTIVITY: CL
DRESSING REGULAR LOWER BODY CLOTHING: TOTAL
PERSONAL GROOMING: TOTAL
DRESSING REGULAR UPPER BODY CLOTHING: A LOT
EATING MEALS: A LOT
EATING MEALS: A LITTLE
STANDING UP FROM CHAIR USING ARMS: A LITTLE
STANDING UP FROM CHAIR USING ARMS: A LOT
MOVING TO AND FROM BED TO CHAIR: UNABLE
SUGGESTED CMS G CODE MODIFIER MOBILITY: CM
DRESSING REGULAR LOWER BODY CLOTHING: TOTAL
DRESSING REGULAR LOWER BODY CLOTHING: TOTAL
TOILETING: A LITTLE
WALKING IN HOSPITAL ROOM: TOTAL
SUGGESTED CMS G CODE MODIFIER MOBILITY: CK
EATING MEALS: A LOT
TURNING FROM BACK TO SIDE WHILE IN FLAT BAD: A LOT
HELP NEEDED FOR BATHING: TOTAL
TOILETING: TOTAL
TURNING FROM BACK TO SIDE WHILE IN FLAT BAD: UNABLE
DAILY ACTIVITIY SCORE: 16
MOVING TO AND FROM BED TO CHAIR: A LITTLE
CLIMB 3 TO 5 STEPS WITH RAILING: TOTAL
MOBILITY SCORE: 8
TURNING FROM BACK TO SIDE WHILE IN FLAT BAD: A LOT
WALKING IN HOSPITAL ROOM: TOTAL
PERSONAL GROOMING: A LITTLE
TOILETING: TOTAL
DRESSING REGULAR LOWER BODY CLOTHING: A LOT
CLIMB 3 TO 5 STEPS WITH RAILING: TOTAL
SUGGESTED CMS G CODE MODIFIER MOBILITY: CN
CLIMB 3 TO 5 STEPS WITH RAILING: A LITTLE
EATING MEALS: TOTAL
CLIMB 3 TO 5 STEPS WITH RAILING: TOTAL
MOVING FROM LYING ON BACK TO SITTING ON SIDE OF FLAT BED: UNABLE
MOBILITY SCORE: 19
MOVING TO AND FROM BED TO CHAIR: UNABLE
MOBILITY SCORE: 7
DAILY ACTIVITIY SCORE: 9
DAILY ACTIVITIY SCORE: 6
EATING MEALS: A LITTLE
WALKING IN HOSPITAL ROOM: A LITTLE
STANDING UP FROM CHAIR USING ARMS: TOTAL
DRESSING REGULAR UPPER BODY CLOTHING: TOTAL
HELP NEEDED FOR BATHING: A LOT
SUGGESTED CMS G CODE MODIFIER DAILY ACTIVITY: CM
SUGGESTED CMS G CODE MODIFIER DAILY ACTIVITY: CN
TOILETING: A LITTLE
PERSONAL GROOMING: A LOT
DRESSING REGULAR LOWER BODY CLOTHING: A LOT
MOVING TO AND FROM BED TO CHAIR: UNABLE
MOVING FROM LYING ON BACK TO SITTING ON SIDE OF FLAT BED: A LITTLE
MOVING FROM LYING ON BACK TO SITTING ON SIDE OF FLAT BED: UNABLE
WALKING IN HOSPITAL ROOM: TOTAL
SUGGESTED CMS G CODE MODIFIER MOBILITY: CM
DAILY ACTIVITIY SCORE: 16
HELP NEEDED FOR BATHING: TOTAL
MOBILITY SCORE: 6
MOVING FROM LYING ON BACK TO SITTING ON SIDE OF FLAT BED: UNABLE

## 2021-01-01 ASSESSMENT — LIFESTYLE VARIABLES
AGITATION: SOMEWHAT MORE THAN NORMAL ACTIVITY
ORIENTATION AND CLOUDING OF SENSORIUM: CANNOT DO SERIAL ADDITIONS OR IS UNCERTAIN ABOUT DATE
NAUSEA AND VOMITING: NO NAUSEA AND NO VOMITING
ANXIETY: MILDLY ANXIOUS
HAVE YOU EVER FELT YOU SHOULD CUT DOWN ON YOUR DRINKING: NO
VISUAL DISTURBANCES: MODERATE SENSITIVITY
HEADACHE, FULLNESS IN HEAD: MODERATE
AUDITORY DISTURBANCES: MILD HARSHNESS OR ABILITY TO FRIGHTEN
TREMOR: *
HEADACHE, FULLNESS IN HEAD: MILD
ORIENTATION AND CLOUDING OF SENSORIUM: DATE DISORIENTATION BY NO MORE THAN TWO CALENDAR DAYS
ORIENTATION AND CLOUDING OF SENSORIUM: CANNOT DO SERIAL ADDITIONS OR IS UNCERTAIN ABOUT DATE
AGITATION: NORMAL ACTIVITY
ANXIETY: MILDLY ANXIOUS
TOTAL SCORE: 8
ON A TYPICAL DAY WHEN YOU DRINK ALCOHOL HOW MANY DRINKS DO YOU HAVE: 0
VISUAL DISTURBANCES: NOT PRESENT
ANXIETY: *
TOTAL SCORE: 14
NAUSEA AND VOMITING: MILD NAUSEA WITH NO VOMITING
AUDITORY DISTURBANCES: NOT PRESENT
AGITATION: SOMEWHAT MORE THAN NORMAL ACTIVITY
TOTAL SCORE: 0
TREMOR: *
NAUSEA AND VOMITING: MILD NAUSEA WITH NO VOMITING
EVER FELT BAD OR GUILTY ABOUT YOUR DRINKING: NO
PAROXYSMAL SWEATS: BARELY PERCEPTIBLE SWEATING. PALMS MOIST
VISUAL DISTURBANCES: NOT PRESENT
VISUAL DISTURBANCES: NOT PRESENT
ANXIETY: *
TREMOR: TREMOR NOT VISIBLE BUT CAN BE FELT, FINGERTIP TO FINGERTIP
ORIENTATION AND CLOUDING OF SENSORIUM: ORIENTED AND CAN DO SERIAL ADDITIONS
AGITATION: NORMAL ACTIVITY
TREMOR: TREMOR NOT VISIBLE BUT CAN BE FELT, FINGERTIP TO FINGERTIP
ANXIETY: MILDLY ANXIOUS
ORIENTATION AND CLOUDING OF SENSORIUM: ORIENTED AND CAN DO SERIAL ADDITIONS
AGITATION: NORMAL ACTIVITY
PAROXYSMAL SWEATS: NO SWEAT VISIBLE
AGITATION: *
AUDITORY DISTURBANCES: NOT PRESENT
ORIENTATION AND CLOUDING OF SENSORIUM: CANNOT DO SERIAL ADDITIONS OR IS UNCERTAIN ABOUT DATE
HEADACHE, FULLNESS IN HEAD: MILD
AUDITORY DISTURBANCES: NOT PRESENT
VISUAL DISTURBANCES: NOT PRESENT
AGITATION: MODERATELY FIDGETY AND RESTLESS
TREMOR: *
ANXIETY: MILDLY ANXIOUS
AUDITORY DISTURBANCES: NOT PRESENT
TREMOR: TREMOR NOT VISIBLE BUT CAN BE FELT, FINGERTIP TO FINGERTIP
ORIENTATION AND CLOUDING OF SENSORIUM: CANNOT DO SERIAL ADDITIONS OR IS UNCERTAIN ABOUT DATE
ANXIETY: MILDLY ANXIOUS
NAUSEA AND VOMITING: NO NAUSEA AND NO VOMITING
VISUAL DISTURBANCES: NOT PRESENT
NAUSEA AND VOMITING: NO NAUSEA AND NO VOMITING
NAUSEA AND VOMITING: NO NAUSEA AND NO VOMITING
TREMOR: TREMOR NOT VISIBLE BUT CAN BE FELT, FINGERTIP TO FINGERTIP
TOTAL SCORE: 0
ORIENTATION AND CLOUDING OF SENSORIUM: CANNOT DO SERIAL ADDITIONS OR IS UNCERTAIN ABOUT DATE
AUDITORY DISTURBANCES: NOT PRESENT
TREMOR: NO TREMOR
AUDITORY DISTURBANCES: NOT PRESENT
AUDITORY DISTURBANCES: NOT PRESENT
VISUAL DISTURBANCES: NOT PRESENT
NAUSEA AND VOMITING: NO NAUSEA AND NO VOMITING
CONSUMPTION TOTAL: NEGATIVE
TOTAL SCORE: 7
PAROXYSMAL SWEATS: NO SWEAT VISIBLE
AUDITORY DISTURBANCES: NOT PRESENT
ALCOHOL_USE: NO
AGITATION: SOMEWHAT MORE THAN NORMAL ACTIVITY
TOTAL SCORE: 7
AUDITORY DISTURBANCES: NOT PRESENT
NAUSEA AND VOMITING: MILD NAUSEA WITH NO VOMITING
EVER HAD A DRINK FIRST THING IN THE MORNING TO STEADY YOUR NERVES TO GET RID OF A HANGOVER: NO
HEADACHE, FULLNESS IN HEAD: MILD
VISUAL DISTURBANCES: NOT PRESENT
ANXIETY: MILDLY ANXIOUS
HEADACHE, FULLNESS IN HEAD: NOT PRESENT
HEADACHE, FULLNESS IN HEAD: NOT PRESENT
ORIENTATION AND CLOUDING OF SENSORIUM: CANNOT DO SERIAL ADDITIONS OR IS UNCERTAIN ABOUT DATE
TREMOR: MODERATE TREMOR WITH ARMS EXTENDED
AGITATION: NORMAL ACTIVITY
ANXIETY: NO ANXIETY (AT EASE)
HEADACHE, FULLNESS IN HEAD: MILD
ORIENTATION AND CLOUDING OF SENSORIUM: CANNOT DO SERIAL ADDITIONS OR IS UNCERTAIN ABOUT DATE
VISUAL DISTURBANCES: NOT PRESENT
NAUSEA AND VOMITING: NO NAUSEA AND NO VOMITING
TOTAL SCORE: 4
HEADACHE, FULLNESS IN HEAD: MILD
NAUSEA AND VOMITING: NO NAUSEA AND NO VOMITING
AGITATION: NORMAL ACTIVITY
HAVE PEOPLE ANNOYED YOU BY CRITICIZING YOUR DRINKING: NO
VISUAL DISTURBANCES: NOT PRESENT
AGITATION: MODERATELY FIDGETY AND RESTLESS
ANXIETY: NO ANXIETY (AT EASE)
VISUAL DISTURBANCES: NOT PRESENT
ORIENTATION AND CLOUDING OF SENSORIUM: CANNOT DO SERIAL ADDITIONS OR IS UNCERTAIN ABOUT DATE
TOTAL SCORE: 0
PAROXYSMAL SWEATS: BARELY PERCEPTIBLE SWEATING. PALMS MOIST
TREMOR: TREMOR NOT VISIBLE BUT CAN BE FELT, FINGERTIP TO FINGERTIP
HEADACHE, FULLNESS IN HEAD: MILD
HEADACHE, FULLNESS IN HEAD: VERY MILD
PAROXYSMAL SWEATS: BARELY PERCEPTIBLE SWEATING. PALMS MOIST
TOTAL SCORE: 6
PAROXYSMAL SWEATS: BARELY PERCEPTIBLE SWEATING. PALMS MOIST
SUBSTANCE_ABUSE: 1
ANXIETY: MILDLY ANXIOUS
NAUSEA AND VOMITING: MILD NAUSEA WITH NO VOMITING
HEADACHE, FULLNESS IN HEAD: VERY MILD
ANXIETY: MILDLY ANXIOUS
HEADACHE, FULLNESS IN HEAD: NOT PRESENT
TOTAL SCORE: 4
HOW MANY TIMES IN THE PAST YEAR HAVE YOU HAD 5 OR MORE DRINKS IN A DAY: 0
TOTAL SCORE: 10
VISUAL DISTURBANCES: NOT PRESENT
TOTAL SCORE: 18
TREMOR: TREMOR NOT VISIBLE BUT CAN BE FELT, FINGERTIP TO FINGERTIP
TREMOR: TREMOR NOT VISIBLE BUT CAN BE FELT, FINGERTIP TO FINGERTIP
PAROXYSMAL SWEATS: NO SWEAT VISIBLE
NAUSEA AND VOMITING: NO NAUSEA AND NO VOMITING
AGITATION: SOMEWHAT MORE THAN NORMAL ACTIVITY
TOTAL SCORE: 4
TOTAL SCORE: 8
PAROXYSMAL SWEATS: NO SWEAT VISIBLE
AUDITORY DISTURBANCES: NOT PRESENT
PAROXYSMAL SWEATS: NO SWEAT VISIBLE
TOTAL SCORE: 4
TOTAL SCORE: 5
ANXIETY: *
PAROXYSMAL SWEATS: NO SWEAT VISIBLE
AUDITORY DISTURBANCES: NOT PRESENT
PAROXYSMAL SWEATS: BARELY PERCEPTIBLE SWEATING. PALMS MOIST
HEADACHE, FULLNESS IN HEAD: MODERATE
PAROXYSMAL SWEATS: NO SWEAT VISIBLE
ORIENTATION AND CLOUDING OF SENSORIUM: CANNOT DO SERIAL ADDITIONS OR IS UNCERTAIN ABOUT DATE
NAUSEA AND VOMITING: MILD NAUSEA WITH NO VOMITING
ORIENTATION AND CLOUDING OF SENSORIUM: ORIENTED AND CAN DO SERIAL ADDITIONS
AVERAGE NUMBER OF DAYS PER WEEK YOU HAVE A DRINK CONTAINING ALCOHOL: 0
AUDITORY DISTURBANCES: NOT PRESENT
TREMOR: TREMOR NOT VISIBLE BUT CAN BE FELT, FINGERTIP TO FINGERTIP
PAROXYSMAL SWEATS: NO SWEAT VISIBLE
VISUAL DISTURBANCES: NOT PRESENT
AGITATION: SOMEWHAT MORE THAN NORMAL ACTIVITY

## 2021-01-01 ASSESSMENT — ENCOUNTER SYMPTOMS
HEADACHES: 1
ABDOMINAL PAIN: 0
TINGLING: 0
TINGLING: 0
VOMITING: 0
BLURRED VISION: 0
BACK PAIN: 0
SENSORY CHANGE: 0
BACK PAIN: 0
HEADACHES: 1
SORE THROAT: 0
FEVER: 0
BACK PAIN: 0
NECK PAIN: 0
SPEECH CHANGE: 0
BLURRED VISION: 0
FEVER: 0
TREMORS: 1
TREMORS: 0
NAUSEA: 0
CONSTIPATION: 0
MYALGIAS: 1
DOUBLE VISION: 0
SPEECH CHANGE: 0
HALLUCINATIONS: 1
NAUSEA: 0
MYALGIAS: 1
HEADACHES: 1
CHILLS: 0
HEADACHES: 0
HEADACHES: 0
SENSORY CHANGE: 0
TREMORS: 0
NECK PAIN: 0
DOUBLE VISION: 0
NECK PAIN: 0
VOMITING: 0
TREMORS: 1
CONSTIPATION: 0
ABDOMINAL PAIN: 0
NECK PAIN: 0
SHORTNESS OF BREATH: 0
AGITATION: 1
NAUSEA: 0
BLURRED VISION: 0
MYALGIAS: 1
MYALGIAS: 1
HEADACHES: 1
NAUSEA: 0
VOMITING: 0
CHILLS: 0
CONSTIPATION: 0
ABDOMINAL PAIN: 0
CONSTIPATION: 0
AGITATION: 1
VOMITING: 0
FOCAL WEAKNESS: 0
SPEECH CHANGE: 0
VOMITING: 0
FEVER: 0
DOUBLE VISION: 0
HEADACHES: 1
CONSTIPATION: 0
HALLUCINATIONS: 1
SENSORY CHANGE: 0
SPEECH CHANGE: 0
FEVER: 0
DIZZINESS: 0
ABDOMINAL PAIN: 0
CHILLS: 0
SENSORY CHANGE: 0
AGITATION: 1
DOUBLE VISION: 0
DIZZINESS: 0
TINGLING: 0
ABDOMINAL PAIN: 0
FOCAL WEAKNESS: 0
SHORTNESS OF BREATH: 0
TINGLING: 0
HEADACHES: 1
CHILLS: 0
SHORTNESS OF BREATH: 0
DOUBLE VISION: 0
SHORTNESS OF BREATH: 0
HEADACHES: 0
TINGLING: 0
MYALGIAS: 1
TREMORS: 1
ABDOMINAL PAIN: 0
DIZZINESS: 0
BACK PAIN: 0
CHILLS: 0
HALLUCINATIONS: 1
BLURRED VISION: 0
FEVER: 0
NAUSEA: 0
BACK PAIN: 0
FOCAL WEAKNESS: 0
FOCAL WEAKNESS: 0
VOMITING: 0
NAUSEA: 0
SENSORY CHANGE: 0
BLURRED VISION: 0
NECK PAIN: 0
DIZZINESS: 0
SHORTNESS OF BREATH: 0
SHORTNESS OF BREATH: 0
DIZZINESS: 0
CHILLS: 0
FOCAL WEAKNESS: 0
SPEECH CHANGE: 0

## 2021-01-01 ASSESSMENT — GAIT ASSESSMENTS
GAIT LEVEL OF ASSIST: MINIMAL ASSIST
GAIT LEVEL OF ASSIST: UNABLE TO PARTICIPATE
DISTANCE (FEET): 12
DEVIATION: BRADYKINETIC
ASSISTIVE DEVICE: FRONT WHEEL WALKER

## 2021-01-01 ASSESSMENT — PAIN DESCRIPTION - PAIN TYPE
TYPE: ACUTE PAIN
TYPE: CHRONIC PAIN
TYPE: ACUTE PAIN
TYPE: OTHER (COMMENT)
TYPE: CHRONIC PAIN
TYPE: ACUTE PAIN
TYPE: ACUTE PAIN
TYPE: CHRONIC PAIN
TYPE: ACUTE PAIN
TYPE: ACUTE PAIN
TYPE: CHRONIC PAIN
TYPE: CHRONIC PAIN
TYPE: ACUTE PAIN;CHRONIC PAIN
TYPE: CHRONIC PAIN
TYPE: CHRONIC PAIN
TYPE: ACUTE PAIN
TYPE: CHRONIC PAIN
TYPE: ACUTE PAIN
TYPE: CHRONIC PAIN
TYPE: OTHER (COMMENT)
TYPE: ACUTE PAIN
TYPE: CHRONIC PAIN
TYPE: ACUTE PAIN
TYPE: OTHER (COMMENT)
TYPE: CHRONIC PAIN
TYPE: ACUTE PAIN
TYPE: CHRONIC PAIN
TYPE: ACUTE PAIN
TYPE: ACUTE PAIN
TYPE: CHRONIC PAIN
TYPE: ACUTE PAIN
TYPE: ACUTE PAIN
TYPE: CHRONIC PAIN
TYPE: ACUTE PAIN
TYPE: CHRONIC PAIN
TYPE: ACUTE PAIN
TYPE: CHRONIC PAIN
TYPE: ACUTE PAIN
TYPE: CHRONIC PAIN
TYPE: ACUTE PAIN
TYPE: CHRONIC PAIN
TYPE: ACUTE PAIN
TYPE: CHRONIC PAIN
TYPE: ACUTE PAIN
TYPE: CHRONIC PAIN
TYPE: ACUTE PAIN
TYPE: CHRONIC PAIN
TYPE: ACUTE PAIN
TYPE: CHRONIC PAIN
TYPE: ACUTE PAIN
TYPE: CHRONIC PAIN
TYPE: ACUTE PAIN
TYPE: CHRONIC PAIN
TYPE: ACUTE PAIN
TYPE: CHRONIC PAIN
TYPE: CHRONIC PAIN
TYPE: ACUTE PAIN
TYPE: CHRONIC PAIN

## 2021-01-01 ASSESSMENT — COPD QUESTIONNAIRES
DURING THE PAST 4 WEEKS HOW MUCH DID YOU FEEL SHORT OF BREATH: NONE/LITTLE OF THE TIME
COPD SCREENING SCORE: 2
DO YOU EVER COUGH UP ANY MUCUS OR PHLEGM?: NO/ONLY WITH OCCASIONAL COLDS OR INFECTIONS
HAVE YOU SMOKED AT LEAST 100 CIGARETTES IN YOUR ENTIRE LIFE: NO/DON'T KNOW
COPD SCREENING SCORE: 2
HAVE YOU SMOKED AT LEAST 100 CIGARETTES IN YOUR ENTIRE LIFE: NO/DON'T KNOW
DO YOU EVER COUGH UP ANY MUCUS OR PHLEGM?: NO/ONLY WITH OCCASIONAL COLDS OR INFECTIONS
DURING THE PAST 4 WEEKS HOW MUCH DID YOU FEEL SHORT OF BREATH: NONE/LITTLE OF THE TIME

## 2021-01-01 ASSESSMENT — FIBROSIS 4 INDEX
FIB4 SCORE: 0.84
FIB4 SCORE: 9.39
FIB4 SCORE: 5.79
FIB4 SCORE: 6.84
FIB4 SCORE: 5.32
FIB4 SCORE: 11.54
FIB4 SCORE: 5.88
FIB4 SCORE: 5.79

## 2021-01-01 ASSESSMENT — PATIENT HEALTH QUESTIONNAIRE - PHQ9
2. FEELING DOWN, DEPRESSED, IRRITABLE, OR HOPELESS: NOT AT ALL
SUM OF ALL RESPONSES TO PHQ9 QUESTIONS 1 AND 2: 0
1. LITTLE INTEREST OR PLEASURE IN DOING THINGS: NOT AT ALL
2. FEELING DOWN, DEPRESSED, IRRITABLE, OR HOPELESS: NOT AT ALL
SUM OF ALL RESPONSES TO PHQ9 QUESTIONS 1 AND 2: 0
2. FEELING DOWN, DEPRESSED, IRRITABLE, OR HOPELESS: NOT AT ALL
1. LITTLE INTEREST OR PLEASURE IN DOING THINGS: NOT AT ALL
SUM OF ALL RESPONSES TO PHQ9 QUESTIONS 1 AND 2: 0
1. LITTLE INTEREST OR PLEASURE IN DOING THINGS: NOT AT ALL

## 2021-01-01 ASSESSMENT — PAIN SCALES - WONG BAKER
WONGBAKER_NUMERICALRESPONSE: DOESN'T HURT AT ALL

## 2021-01-01 ASSESSMENT — ACTIVITIES OF DAILY LIVING (ADL): TOILETING: INDEPENDENT

## 2021-01-21 PROBLEM — E87.1 HYPONATREMIA: Status: ACTIVE | Noted: 2021-01-01

## 2021-01-21 PROBLEM — Z11.9 SCREENING EXAMINATION FOR INFECTIOUS DISEASE: Status: ACTIVE | Noted: 2021-01-01

## 2021-01-21 PROBLEM — F10.939 ALCOHOL WITHDRAWAL SYNDROME WITH COMPLICATION (HCC): Status: ACTIVE | Noted: 2021-01-01

## 2021-01-21 PROBLEM — E87.1 HYPONATREMIA: Status: ACTIVE | Noted: 2020-01-01

## 2021-01-21 PROBLEM — F10.11 HISTORY OF ALCOHOL ABUSE: Status: ACTIVE | Noted: 2021-01-01

## 2021-01-21 PROBLEM — Z53.09 CONTRAINDICATION TO DEEP VEIN THROMBOSIS (DVT) PROPHYLAXIS: Status: ACTIVE | Noted: 2021-01-01

## 2021-01-21 PROBLEM — S06.5XAA TRAUMATIC SUBDURAL HEMATOMA (HCC): Status: ACTIVE | Noted: 2021-01-01

## 2021-01-21 NOTE — ED TRIAGE NOTES
Chief Complaint   Patient presents with   • Sent by MD     4mm Right lateral SDH. Pt denies falls in the past week. Some old brusing noted to chin and chest.    • ETOH Withdrawal     last drink 3 days ago. Given ativan, NS 1000mL and magnesium 4g prior to arrival     Transfer from [facility] by [facility] for above. No blood thinners. Chart up for ERP.    Pt will perform bed mobility independently without use of bed rail within 3 days.

## 2021-01-21 NOTE — ASSESSMENT & PLAN NOTE
Systemic anticoagulation contraindicated secondary to elevated bleeding risk.  RAP score 6.  1/25 Trauma screening bilateral lower extremity venous duplex negative for above knee DVT.   Ambulate TID.

## 2021-01-21 NOTE — ASSESSMENT & PLAN NOTE
Admission SARS-CoV-2 testing negative. Repeat SARS-CoV-2 testing not indicated. Isolation precautions de-escalated.

## 2021-01-21 NOTE — H&P
"Trauma Surgery History and Physical    Chief Complaint: Acute SDH.     History of Present Illness: The patient is a 70 year-old White elderly woman who was injured in a fall. The patient suffered a mechanical ground-level fall. The patient had a probable brief loss of consciousness. The patient denies any chronic anticoagulation or antiplatelet medications. The patient is unable to articulate any subjective complaints.    Triage Category: The patient was triaged as a Non-Trauma activation transfer from [facility] after CT imaging demonstrated an acute intracranial hemorrhage.  Preliminary work-up was performed by the emergency department physician.     Past Medical History: Alcoholism, hypothyroidism, and hypertension.    Past Surgical History:  has a past surgical history that includes hysterectomy laparoscopy and elsie by laparoscopy.    Allergies:   Allergies   Allergen Reactions   • Codeine Unspecified     \"like a heartattack\"   • Morphine Unspecified     \"like a heartattack\"       Current Medications:    Home Medications     Reviewed by Mynor Nathan, PhT (Pharmacy Tech) on 01/21/21 at 1533  Med List Status: Complete   Medication Last Dose Status   acetaminophen (TYLENOL) 325 MG Tab Not Taking Active   B Complex Vitamins (VITAMIN B COMPLEX PO) 1/20/2021 Active   bisacodyl (DULCOLAX) 10 MG Suppos Not Taking Active   docusate sodium 100 MG Cap Not Taking Active   enoxaparin (LOVENOX) 30 MG/0.3ML Solution inj Not Taking Active   fludrocortisone (FLORINEF) 0.1 MG Tab Not Taking Active   haloperidol lactate (HALDOL) 5 MG/ML Solution Not Taking Active   labetalol (NORMODYNE,TRANDATE) 5 MG/ML Solution Not Taking Active   levothyroxine (SYNTHROID) 100 MCG Tab 1/20/2021 Active   levothyroxine (SYNTHROID) 75 MCG Tab Not Taking Active   lisinopril (PRINIVIL) 5 MG Tab 1/20/2021 Active   lisinopril (PRINIVIL, ZESTRIL) 40 MG tablet Not Taking Active   magnesium hydroxide (MILK OF MAGNESIA) 400 MG/5ML " "Suspension couple days ago Active   ondansetron (ZOFRAN) 4 MG/2ML Solution injection Not Taking Active   Pharmacy Consult Request Not Taking Active   polyethylene glycol/lytes (MIRALAX) Pack Not Taking Active   senna-docusate (PERICOLACE OR SENOKOT S) 8.6-50 MG Tab Not Taking Active   senna-docusate (PERICOLACE OR SENOKOT S) 8.6-50 MG Tab Not Taking Active   sodium chloride (SALT) 1 GM Tab Not Taking Active   Sodium Phosphates (FLEET) 7-19 GM/118ML Enema Not Taking Active                Family History: family history is not on file.    Social History:  reports that she has never smoked. She has never used smokeless tobacco. She reports current alcohol use. She reports that she does not use drugs.    Review of Systems: Comprehensive review of systems is not able to be elicited from the patient secondary to the acuity of the clinical situation.    Physical Examination:     Constitutional:     Vital Signs: /87   Pulse (!) 112   Temp 37 °C (98.6 °F) (Oral)   Resp 18   Ht 1.676 m (5' 6\")   Wt 95.3 kg (210 lb)   SpO2 98%    General Appearance: The patient is a disheveled elderly woman in mild distress.  HEENT:    No significant external craniofacial trauma. The pupils are equal, round, and reactive to light bilaterally. The extraocular muscles are intact bilaterally. The ear canals and tympanic membranes are clear bilaterally. The nares and oropharynx are clear. The midface and jaw are stable.  No malocclusion is evident.  Neck:    The cervical spine is supple and non tender.  Respiratory:   Inspection: Unlabored respirations, no intercostal retractions, paradoxical motion, or accessory muscle use.  Bruising over the manubrium.   Palpation:  The chest is nontender. The clavicles are non deformed bilaterally.   Auscultation: clear to auscultation.  Cardiovascular:   Inspection: The skin is warm and dry.  Auscultation: Sinus tachycardia.   Peripheral Pulses: Normal.   Abdomen:   Inspection: Abdominal inspection " reveals no abrasions, contusions, lacerations or penetrating wounds.   Palpation: Palpation is remarkable for no significant tenderness, guarding, or peritoneal findings.  Musculoskeletal:   The pelvis is stable. No significant angulation, deformity, or soft tissue injury involving the upper and lower extremities. A host of subacute bruises and contusions of the upper and lower extremities.  Back:   The thoracolumbar spine was examined utilizing spinal motion restriction. Examination is remarkable for no significant tenderness, swelling, or deformity in the thoracolumbar region.  Skin:    Examination of the skin reveals bruising and abrasions of varying ages consistent with frequent mechanical ground-level falls.  Neurologic:    Almas Coma Scale (GCS) Eye Opening (spontaneous 4), Verbal Response (oriented 5), Best Motor Response (obeys commands 6). GCS 15.    Neurologic examination reveals no focal deficits noted, mental status intact, cranial nerves II through XII intact, muscle tone normal, muscle strength normal, sensation is grossly intact, tremulous.  Psychiatric:   The patient oriented to time, place, person.    Laboratory Values:   Recent Labs     01/21/21  1510   WBC 9.2   RBC 2.91*   HEMOGLOBIN 10.1*   HEMATOCRIT 29.0*   MCV 99.7*   MCH 34.7*   MCHC 34.8   RDW 53.6*   PLATELETCT 165   MPV 10.4         Recent Labs     01/21/21  1510   INR 1.10     Recent Labs     01/21/21  1510   APTT 30.3   INR 1.10        Imaging:   Radiographic read of the patient's transfer CT imaging is consistent with a 4 mm thick right lateral convexity acute subdural hematoma without shift or mass-effect.  There is a stable 1.7 x 1.3 cm right occipital calcified lesion consistent with cavernoma.    Assessment and Plan:     Traumatic subdural hematoma (HCC)  4mm right lateral subdural hematoma  Interval repeat head CT pending  Non-operative management.  Post traumatic pharmacologic seizure prophylaxis for 1 week.  Speech Language  Pathology cognitive evaluation.  Maikol Trimble MD. Neurosurgeon. [facility].     Hyponatremia  Sodium from referring facility 125  Trend    Hypothyroid  Chronic condition treated with levothyroxine  Definitive medication reconciliation pending.       Essential hypertension  Chronic condition treated with lisinopril.  Definitive medication reconciliation pending.      Contraindication to deep vein thrombosis (DVT) prophylaxis  Systemic anticoagulation contraindicated secondary to elevated bleeding risk.  1/23 Trauma screening duplex ordered    Screening examination for infectious disease  1/21 COVID-19 specimen sent. AIRBORNE & CONTACT/EYE ISOLATION implemented pending final SARS-CoV-2 testing.      History of alcohol abuse  History of alcohol abuse with withdrawal symptoms noted in Emergency Department  Alcohol withdrawal protocol initiated on admission      Disposition: Trauma ICU.  Trauma tertiary survey.    Critical Care Time: 36 minutes excluding procedures.       ____________________________________     Grayson López M.D.    DD: 1/21/2021  3:27 PM

## 2021-01-21 NOTE — ASSESSMENT & PLAN NOTE
Asymptomatic, likely longstanding hyponatremia.  1/25 1500ml fluid restriction initiated. NaPhos replacement.  1/26 Salt tabs and florinef initiated given head bleed.  1/27 Increase oral sodium supplementation and florinef.   1/28 Serum sodium trend up.  Goal > 130 per neurosurgery.  Trend.

## 2021-01-21 NOTE — ASSESSMENT & PLAN NOTE
History of alcohol abuse with withdrawal symptoms noted in Emergency Department.  Alcohol withdrawal protocol initiated on admission.  1/26 TJ protocol on marques.  1/27 KS TJ.

## 2021-01-21 NOTE — ED NOTES
Med Rec completed per patient and patients home pharmacy ([facility])  Allergies reviewed  No ORAL antibiotics in last 14 days    Patient reports that she is taking four medications levothyroxine, lisinopril, vit b complex and last one she does not recall or know what is for. Called pharmacy pharmacy verified last fill and strength for levothyroxine, lisinopril. She last filled in Oct 2020 for 90 days. Patient did fill pantoprazole and folic acid in Nov 2020 for 30 days but patient reports she does not take those. Those are the only recent medications patient has filled at home pharmacy. Patient reports no other pharmacy's that she fills at.

## 2021-01-21 NOTE — ASSESSMENT & PLAN NOTE
4mm right lateral subdural hematoma without mass-effect.  Repeat interval CT imaging of the brain stable.  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  Post traumatic pharmacologic seizure prophylaxis for 1 week.  Speech Language Pathology cognitive evaluation.    1/28 Neurosurgery signed off.  Follow up in 2 weeks time with a repeat non-contrast CT of the head.  Maikol Trimble MD. Neurosurgeon. [facility].

## 2021-01-22 PROBLEM — E87.6 HYPOKALEMIA: Status: ACTIVE | Noted: 2021-01-01

## 2021-01-22 PROBLEM — E83.39 HYPOPHOSPHATEMIA: Status: ACTIVE | Noted: 2021-01-01

## 2021-01-22 NOTE — PROGRESS NOTES
Trauma / Surgical Daily Progress Note    Date of Service  1/22/2021    Chief Complaint  70 y.o. female admitted 1/21/2021 with Subdural hematoma    Interval Events  Neurologically stable  Head CT stable  MRI brain pending  Alcohol withdrawal manifested, needs constant nursing observation  Major electrolyte imbalance addressed  Tertiary survey completed    The patient remains critically injured with severe closed head injury and Acute alcohol withdrawal syndrome, with underlying comorbidities.  The patient was seen and examined on rounds and discussed with the multidisciplinary critical care team and consulting physicians. Critically evaluated laboratory tests, culture data, medications, imaging, and other diagnostic tests.    The patient has impairment of one or more vital organ systems and a high probability of imminent or life-threatening deterioration in condition. Provided high complexity decision making to assess, manipulate, and support vital system functions to treat vital organ system failure and/or to prevent further life-threatening deterioration of the patient's condition. Requires continued ICU and hospital admission.    Critical care interventions include: integration of multiple data points and associated complex medical decision making, management of intracranial hypertension and management of critical electrolyte abnormalities.      Review of Systems  Review of Systems   HENT: Positive for hearing loss.    Neurological: Positive for headaches.   Psychiatric/Behavioral: Positive for agitation and hallucinations.   All other systems reviewed and are negative.       Vital Signs for last 24 hours  Temp:  [36.7 °C (98 °F)-37.4 °C (99.3 °F)] 36.7 °C (98 °F)  Pulse:  [] 81  Resp:  [16-56] 26  BP: ()/(50-91) 126/64  SpO2:  [89 %-100 %] 100 %    Hemodynamic parameters for last 24 hours       Respiratory Data     Respiration: (!) 26, Pulse Oximetry: 100 %        RUL Breath Sounds: Clear, RML Breath  "Sounds: Diminished, RLL Breath Sounds: Diminished, CAIN Breath Sounds: Clear, LLL Breath Sounds: Diminished    Physical Exam  Physical Exam  Vitals signs and nursing note reviewed. Exam conducted with a chaperone present.     Vitals: BP (!) 98/57   Pulse 94   Temp 36.7 °C (98 °F) (Temporal)   Resp 16   Ht 1.676 m (5' 6\")   Wt 84 kg (185 lb 3 oz)   SpO2 99%   BMI 29.89 kg/m²   Constitutional:                General Appearance: Mild detox symptomes.  HEENT:               Bruising to head, cheek . The pupils are equal, round, and reactive to light bilaterally. The extraocular muscles are intact bilaterally.. The nares and oropharynx are clear. The midface and jaw are stable. No malocclusion is evident.  Neck:               No posterior midline cervical-spine tenderness, no evidence of intoxication, normal level of alertness ([place] Coma Scale 15), no focal neurologic deficit, and no painful distracting injuries.  Respiratory:              Inspection: Unlabored respirations, no intercostal retractions, paradoxical motion, or accessory muscle use.              Palpation:  The chest is nontender. The clavicles are non deformed bilaterally..              Auscultation: normal, clear to auscultation.  Cardiovascular:              Auscultation: normal and regular rate and rhythm.              Peripheral Pulses: Normal.   Abdomen:              Abdomen is soft, nontender, without organomegaly or masses.  Genitourinary:              (FEMALE): normal female external genitalia without lesions.  Musculoskeletal:              The pelvis is stable. Numerous areas of various stages of bruising noted on extremeties   Back:              The thoracolumbar spine was examined. Examination is remarkable for no significant tenderness, swelling, or deformity in the thoracolumbar region.  Skin:              The skin is warm and dry.  Neurologic:               [place] Coma Scale (GCS) 15 [id number]. Neurologic examination revealed oriented " for age x3. tremulous  Psychiatric:              The patient is mildly anxious  .    Laboratory  Recent Results (from the past 24 hour(s))   CBC WITH DIFFERENTIAL    Collection Time: 01/21/21  3:10 PM   Result Value Ref Range    WBC 9.2 4.8 - 10.8 K/uL    RBC 2.91 (L) 4.20 - 5.40 M/uL    Hemoglobin 10.1 (L) 12.0 - 16.0 g/dL    Hematocrit 29.0 (L) 37.0 - 47.0 %    MCV 99.7 (H) 81.4 - 97.8 fL    MCH 34.7 (H) 27.0 - 33.0 pg    MCHC 34.8 33.6 - 35.0 g/dL    RDW 53.6 (H) 35.9 - 50.0 fL    Platelet Count 165 164 - 446 K/uL    MPV 10.4 9.0 - 12.9 fL    Neutrophils-Polys 84.90 (H) 44.00 - 72.00 %    Lymphocytes 9.40 (L) 22.00 - 41.00 %    Monocytes 5.10 0.00 - 13.40 %    Eosinophils 0.00 0.00 - 6.90 %    Basophils 0.10 0.00 - 1.80 %    Immature Granulocytes 0.50 0.00 - 0.90 %    Nucleated RBC 0.00 /100 WBC    Neutrophils (Absolute) 7.77 (H) 2.00 - 7.15 K/uL    Lymphs (Absolute) 0.86 (L) 1.00 - 4.80 K/uL    Monos (Absolute) 0.47 0.00 - 0.85 K/uL    Eos (Absolute) 0.00 0.00 - 0.51 K/uL    Baso (Absolute) 0.01 0.00 - 0.12 K/uL    Immature Granulocytes (abs) 0.05 0.00 - 0.11 K/uL    NRBC (Absolute) 0.00 K/uL   COMP METABOLIC PANEL    Collection Time: 01/21/21  3:10 PM   Result Value Ref Range    Sodium 125 (L) 135 - 145 mmol/L    Potassium 3.4 (L) 3.6 - 5.5 mmol/L    Chloride 91 (L) 96 - 112 mmol/L    Co2 21 20 - 33 mmol/L    Anion Gap 13.0 7.0 - 16.0    Glucose 138 (H) 65 - 99 mg/dL    Bun 6 (L) 8 - 22 mg/dL    Creatinine 0.73 0.50 - 1.40 mg/dL    Calcium 8.5 8.5 - 10.5 mg/dL    AST(SGOT) 253 (H) 12 - 45 U/L    ALT(SGPT) 127 (H) 2 - 50 U/L    Alkaline Phosphatase 246 (H) 30 - 99 U/L    Total Bilirubin 4.0 (H) 0.1 - 1.5 mg/dL    Albumin 3.3 3.2 - 4.9 g/dL    Total Protein 6.3 6.0 - 8.2 g/dL    Globulin 3.0 1.9 - 3.5 g/dL    A-G Ratio 1.1 g/dL   LIPASE    Collection Time: 01/21/21  3:10 PM   Result Value Ref Range    Lipase 18 11 - 82 U/L   APTT    Collection Time: 01/21/21  3:10 PM   Result Value Ref Range    APTT 30.3 24.7 -  36.0 sec   PROTHROMBIN TIME (INR)    Collection Time: 01/21/21  3:10 PM   Result Value Ref Range    PT 14.5 12.0 - 14.6 sec    INR 1.10 0.87 - 1.13   PLATELET MAPPING WITH BASIC TEG    Collection Time: 01/21/21  3:10 PM   Result Value Ref Range    Reaction Time Initial-R 5.4 5.0 - 10.0 min    Clot Kinetics-K 1.4 1.0 - 3.0 min    Clot Angle-Angle 70.0 53.0 - 72.0 degrees    Maximum Clot Strength-MA 61.1 50.0 - 70.0 mm    Lysis 30 minutes-LY30 0.4 0.0 - 8.0 %    % Inhibition ADP 56.1 %    % Inhibition AA 28.7 %    TEG Algorithm Link Algorithm    COV-2, FLU A/B, AND RSV BY PCR (2-4 HOURS CEPHEID): Collect NP swab in [facility]    Collection Time: 01/21/21  3:10 PM    Specimen: Nasopharyngeal; Respirate   Result Value Ref Range    Influenza virus A RNA Negative Negative    Influenza virus B, PCR Negative Negative    RSV, PCR Negative Negative    SARS-CoV-2 by PCR NotDetected     SARS-CoV-2 Source NP Swab    ESTIMATED GFR    Collection Time: 01/21/21  3:10 PM   Result Value Ref Range    GFR If African American >60 >60 mL/min/1.73 m 2    GFR If Non African American >60 >60 mL/min/1.73 m 2   CBC with Differential: Tomorrow AM    Collection Time: 01/22/21  5:30 AM   Result Value Ref Range    WBC 5.0 4.8 - 10.8 K/uL    RBC 2.35 (L) 4.20 - 5.40 M/uL    Hemoglobin 8.2 (L) 12.0 - 16.0 g/dL    Hematocrit 23.8 (L) 37.0 - 47.0 %    .3 (H) 81.4 - 97.8 fL    MCH 34.9 (H) 27.0 - 33.0 pg    MCHC 34.5 33.6 - 35.0 g/dL    RDW 53.8 (H) 35.9 - 50.0 fL    Platelet Count 109 (L) 164 - 446 K/uL    MPV 10.3 9.0 - 12.9 fL    Neutrophils-Polys 78.20 (H) 44.00 - 72.00 %    Lymphocytes 13.00 (L) 22.00 - 41.00 %    Monocytes 7.00 0.00 - 13.40 %    Eosinophils 1.00 0.00 - 6.90 %    Basophils 0.20 0.00 - 1.80 %    Immature Granulocytes 0.60 0.00 - 0.90 %    Nucleated RBC 0.00 /100 WBC    Neutrophils (Absolute) 3.92 2.00 - 7.15 K/uL    Lymphs (Absolute) 0.65 (L) 1.00 - 4.80 K/uL    Monos (Absolute) 0.35 0.00 - 0.85 K/uL    Eos (Absolute) 0.05 0.00 -  0.51 K/uL    Baso (Absolute) 0.01 0.00 - 0.12 K/uL    Immature Granulocytes (abs) 0.03 0.00 - 0.11 K/uL    NRBC (Absolute) 0.00 K/uL   Comp Metabolic Panel (CMP): Tomorrow AM    Collection Time: 01/22/21  5:30 AM   Result Value Ref Range    Sodium 126 (L) 135 - 145 mmol/L    Potassium 2.7 (LL) 3.6 - 5.5 mmol/L    Chloride 91 (L) 96 - 112 mmol/L    Co2 23 20 - 33 mmol/L    Anion Gap 12.0 7.0 - 16.0    Glucose 114 (H) 65 - 99 mg/dL    Bun 5 (L) 8 - 22 mg/dL    Creatinine 0.69 0.50 - 1.40 mg/dL    Calcium 7.5 (L) 8.5 - 10.5 mg/dL    AST(SGOT) 168 (H) 12 - 45 U/L    ALT(SGPT) 85 (H) 2 - 50 U/L    Alkaline Phosphatase 188 (H) 30 - 99 U/L    Total Bilirubin 3.0 (H) 0.1 - 1.5 mg/dL    Albumin 2.5 (L) 3.2 - 4.9 g/dL    Total Protein 4.8 (L) 6.0 - 8.2 g/dL    Globulin 2.3 1.9 - 3.5 g/dL    A-G Ratio 1.1 g/dL   MAGNESIUM    Collection Time: 01/22/21  5:30 AM   Result Value Ref Range    Magnesium 1.6 1.5 - 2.5 mg/dL   PHOSPHORUS    Collection Time: 01/22/21  5:30 AM   Result Value Ref Range    Phosphorus 1.4 (L) 2.5 - 4.5 mg/dL   ESTIMATED GFR    Collection Time: 01/22/21  5:30 AM   Result Value Ref Range    GFR If African American >60 >60 mL/min/1.73 m 2    GFR If Non African American >60 >60 mL/min/1.73 m 2       Fluids    Intake/Output Summary (Last 24 hours) at 1/22/2021 1304  Last data filed at 1/22/2021 0700  Gross per 24 hour   Intake 1296.67 ml   Output --   Net 1296.67 ml       Core Measures & Quality Metrics  Core Measures & Quality Metrics  ADDY Score  ETOH Screening    Assessment/Plan  Traumatic subdural hematoma (HCC)- (present on admission)  Assessment & Plan  4mm right lateral subdural hematoma without mass-effect.  Repeat interval CT imaging of the brain stable.  MR imaging of the brain to better characterize when withdrawal symptoms permit.  Non-operative management.  Post traumatic pharmacologic seizure prophylaxis for 1 week.  Speech Language Pathology cognitive evaluation.  Maikol Trimble MD.  Neurosurgeon. [facility].     Hypophosphatemia- (present on admission)  Assessment & Plan  Replace with 30 mmol KPhos and trend.    Hypokalemia- (present on admission)  Assessment & Plan  Profound hypokalemia.    Replete with oral supplementation and trend.  Empiric magnesium replacement.    Screening examination for infectious disease- (present on admission)  Assessment & Plan  Admission SARS-CoV-2 testing negative. Repeat SARS-CoV-2 testing not indicated. Isolation precautions de-escalated.    Contraindication to deep vein thrombosis (DVT) prophylaxis- (present on admission)  Assessment & Plan  Systemic anticoagulation contraindicated secondary to elevated bleeding risk.  1/23 Trauma screening duplex ordered.    History of alcohol abuse- (present on admission)  Assessment & Plan  History of alcohol abuse with withdrawal symptoms noted in Emergency Department  Alcohol withdrawal protocol initiated on admission    Hyponatremia- (present on admission)  Assessment & Plan  Asymptomatic, likely longstanding hyponatremia.  Resuscitate with normal saline and trend.    Essential hypertension- (present on admission)  Assessment & Plan  Chronic condition treated with lisinopril.  Resumed maintenance medication on admission.    Hypothyroid- (present on admission)  Assessment & Plan  Chronic condition treated with levothyroxine  Resumed maintenance medication on admission.         Discussed patient condition with RN, RT,  and Patient.  CRITICAL CARE TIME EXCLUDING PROCEDURES: 41    minutes

## 2021-01-22 NOTE — THERAPY
Occupational Therapy   Initial Evaluation     Patient Name: Jennifer Koroma  Age:  70 y.o., Sex:  female  Medical Record #: [id number]  Today's Date: 1/22/2021          Assessment  Patient is 70 y.o. female with a diagnosis of SDH.   Pt currently limited by decreased functional mobility, activity tolerance, cognition, strength, AROM, balance, and pain which are affecting pt's ability to complete ADLs/IADLs at baseline. Pt would benefit from OT services in the acute care setting to maximize functional recovery.      Plan    Recommend Occupational Therapy 3 times per week until therapy goals are met for the following treatments:  Self Care/Activities of Daily Living and Therapeutic Activities.       Discharge Recommendations: (P) Recommend post-acute placement for additional occupational therapy services prior to discharge home        01/22/21 1056   Prior Living Situation   Prior Services Skilled Home Health Services   Housing / Facility 1 Story House   Steps Into Home 4   Equipment Owned None   Lives with - Patient's Self Care Capacity Alone and Able to Care For Self   Prior Level of ADL Function   Self Feeding Independent   Grooming / Hygiene Independent   Bathing Independent   Dressing Independent   Toileting Independent   ADL Assessment   Grooming Moderate Assist   Upper Body Dressing Maximal Assist   Lower Body Dressing Total Assist   Toileting Total Assist   Functional Mobility   Sit to Stand Maximal Assist  (x2)   Bed, Chair, Wheelchair Transfer Unable to Participate   Short Term Goals   Short Term Goal # 1 min A with UB dressing   Short Term Goal # 2 Mod A with LB dressing   Short Term Goal # 3 Mod A with ADL txfs

## 2021-01-22 NOTE — ASSESSMENT & PLAN NOTE
Profound hypokalemia.  Empiric magnesium replacement.  Potassium replaced.  1/23 Normalized.  1/25 Supplementation discontinued.  Trend.

## 2021-01-22 NOTE — PROGRESS NOTES
"Trauma Progress Note     Briefly, this is a 70 y.o. female with a ICH following GLF, ETOH.    /59   Pulse (!) 54   Temp 37.2 °C (98.9 °F) (Temporal)   Resp (!) 131   Ht 1.676 m (5' 6\")   Wt 95.3 kg (210 lb)   SpO2 99%   BMI 33.89 kg/m²       Intake/Output Summary (Last 24 hours) at 1/21/2021 2016  Last data filed at 1/21/2021 1800  Gross per 24 hour   Intake 250 ml   Output --   Net 250 ml       Lab Results   Component Value Date/Time    WBC 9.2 01/21/2021 03:10 PM    RBC 2.91 (L) 01/21/2021 03:10 PM    HEMOGLOBIN 10.1 (L) 01/21/2021 03:10 PM    HEMATOCRIT 29.0 (L) 01/21/2021 03:10 PM    MCV 99.7 (H) 01/21/2021 03:10 PM    MCH 34.7 (H) 01/21/2021 03:10 PM    MCHC 34.8 01/21/2021 03:10 PM    MPV 10.4 01/21/2021 03:10 PM    NEUTSPOLYS 84.90 (H) 01/21/2021 03:10 PM    LYMPHOCYTES 9.40 (L) 01/21/2021 03:10 PM    MONOCYTES 5.10 01/21/2021 03:10 PM    EOSINOPHILS 0.00 01/21/2021 03:10 PM    BASOPHILS 0.10 01/21/2021 03:10 PM        Lab Results   Component Value Date/Time    SODIUM 125 (L) 01/21/2021 03:10 PM    POTASSIUM 3.4 (L) 01/21/2021 03:10 PM    CHLORIDE 91 (L) 01/21/2021 03:10 PM    CO2 21 01/21/2021 03:10 PM    GLUCOSE 138 (H) 01/21/2021 03:10 PM    BUN 6 (L) 01/21/2021 03:10 PM    CREATININE 0.73 01/21/2021 03:10 PM    CREATININE 0.7 12/19/2007 12:35 PM        Lab Results   Component Value Date/Time    PROTHROMBTM 14.5 01/21/2021 03:10 PM    INR 1.10 01/21/2021 03:10 PM        Recent Labs     01/21/21  1510   REACTMIN 5.4   CLOTKINET 1.4   CLOTANGL 70.0   MAXCLOTS 61.1   [id number] 0.4   PRCINADP 56.1   PRCINAA 28.7       Assessment:  AO, follows commands  Some signs of alcohol withdrawal, tremulous  Complaints of chronic shoulder and back pain - Lidocaine patches added  Neuro recommendations reviewed, may need MRI per neurosurgery, not yet ordered    Additional Plans:  Home medications reconciled  Continue current plan of care      "

## 2021-01-22 NOTE — CARE PLAN
Problem: Communication  Goal: The ability to communicate needs accurately and effectively will improve  Outcome: PROGRESSING AS EXPECTED     Problem: Safety  Goal: Will remain free from injury  Outcome: PROGRESSING AS EXPECTED  Goal: Will remain free from falls  Outcome: PROGRESSING AS EXPECTED     Problem: Infection  Goal: Will remain free from infection  Outcome: PROGRESSING AS EXPECTED     Problem: Venous Thromboembolism (VTW)/Deep Vein Thrombosis (DVT) Prevention:  Goal: Patient will participate in Venous Thrombosis (VTE)/Deep Vein Thrombosis (DVT)Prevention Measures  Outcome: PROGRESSING AS EXPECTED     Problem: Bowel/Gastric:  Goal: Normal bowel function is maintained or improved  Outcome: PROGRESSING AS EXPECTED  Goal: Will not experience complications related to bowel motility  Outcome: PROGRESSING AS EXPECTED     Problem: Knowledge Deficit  Goal: Knowledge of disease process/condition, treatment plan, diagnostic tests, and medications will improve  Outcome: PROGRESSING AS EXPECTED  Goal: Knowledge of the prescribed therapeutic regimen will improve  Outcome: PROGRESSING AS EXPECTED     Problem: Discharge Barriers/Planning  Goal: Patient's continuum of care needs will be met  Outcome: PROGRESSING AS EXPECTED     Problem: Pain Management  Goal: Pain level will decrease to patient's comfort goal  Outcome: PROGRESSING AS EXPECTED     Problem: Respiratory:  Goal: Respiratory status will improve  Outcome: PROGRESSING AS EXPECTED     Problem: Psychosocial Needs:  Goal: Level of anxiety will decrease  Outcome: PROGRESSING AS EXPECTED     Problem: Skin Integrity  Goal: Risk for impaired skin integrity will decrease  Outcome: PROGRESSING AS EXPECTED

## 2021-01-22 NOTE — DISCHARGE PLANNING
Care Transition Team Assessment  Patient transferred here from [facility]. Initial assessment performed at [facility] indicates patient does not have anyone who can transport her home upon discharge. Per face sheet patient lives in [place]. Emergency Contact is daughter Brigida Johnson phone [phone].     She lives alone and per notes is Independent.  Her support system is children and granddaughter who lives in [place].  She has Medicare (primary) and [employer] (secondary)    Information Source  Orientation : Disoriented to Time, Disoriented to Place  Who is responsible for making decisions for patient? : Patient    Readmission Evaluation  Is this a readmission?: No    Elopement Risk  Legal Hold: No    Interdisciplinary Discharge Planning  Does Admitting Nurse Feel This Could be a Complex Discharge?: No  Primary Care Physician: Rob  Lives with - Patient's Self Care Capacity: Alone and Able to Care For Self  Support Systems: Children(children, granddaughter lives in [place])  Able to Return to Previous ADL's: Future Time w/Therapy  Mobility Issues: Yes(recent falls)  Prior Services: Skilled Home Health Services(PT)  Assistance Needed: Unknown at this Time  Durable Medical Equipment: Not Applicable    Discharge Preparedness  What are your discharge supports?: Child  Prior Functional Level: Independent with Activities of Daily Living  Difficulity with ADLs: None  Difficulity with IADLs: None    Functional Assesment  Prior Functional Level: Independent with Activities of Daily Living    Finances  Prescription Coverage: (unable to determine from chart at this time)    Vision / Hearing Impairment  Right Eye Vision: Impaired  Left Eye Vision: Impaired         Advance Directive  Advance Directive?: None    Domestic Abuse  Have you ever been the victim of abuse or violence?: No    Psychological Assessment  History of Substance Abuse: Alcohol    Discharge Risks or Barriers  Discharge risks or barriers?: Substance  abuse  Patient risk factors: Substance abuse    Anticipated Discharge Information  Discharge Disposition: Still a Patient (30)

## 2021-01-22 NOTE — PROGRESS NOTES
Neurosurgery Progress Note    Subjective:  Pt awake, c/o mild ha      Exam:    A&O x3, GCS 15  PERRL 4mm, EOMI  Face symm, tongue midline  RUVALCABA with FS    BP  Min: 82/50  Max: 148/88  Pulse  Av  Min: 77  Max: 218  Resp  Av.9  Min: 16  Max: 56  Temp  Av.2 °C (99 °F)  Min: 37 °C (98.6 °F)  Max: 37.4 °C (99.3 °F)  SpO2  Av.5 %  Min: 89 %  Max: 100 %    No data recorded    Recent Labs     21  1510 21  0530   WBC 9.2 5.0   RBC 2.91* 2.35*   HEMOGLOBIN 10.1* 8.2*   HEMATOCRIT 29.0* 23.8*   MCV 99.7* 101.3*   MCH 34.7* 34.9*   MCHC 34.8 34.5   RDW 53.6* 53.8*   PLATELETCT 165 109*   MPV 10.4 10.3     Recent Labs     21  1510 21  0530   SODIUM 125* 126*   POTASSIUM 3.4* 2.7*   CHLORIDE 91* 91*   CO2 21 23   GLUCOSE 138* 114*   BUN 6* 5*   CREATININE 0.73 0.69   CALCIUM 8.5 7.5*     Recent Labs     21  1510   APTT 30.3   INR 1.10     Recent Labs     21  1510   REACTMIN 5.4   CLOTKINET 1.4   CLOTANGL 70.0   MAXCLOTS 61.1   [id number] 0.4   PRCINADP 56.1   PRCINAA 28.7       Intake/Output       21 0700 - 21 0659 21 07 - 21 0659       Total  Total       Intake    IV Piggyback  250  -- 250  --  -- --    Volume (mL) (detox IV 1000 mL (D5LR + magnesium 1 g + thiamine 100 mg + folic acid 1 mg) infusion) 250 -- 250 -- -- --    Total Intake 250 -- 250 -- -- --       Output    Urine  --  -- --  --  -- --    Number of Times Voided -- 1 x 1 x -- -- --    Total Output -- -- -- -- -- --       Net I/O     250 -- 250 -- -- --            Intake/Output Summary (Last 24 hours) at 2021 0851  Last data filed at 2021 1800  Gross per 24 hour   Intake 250 ml   Output --   Net 250 ml       $ Bladder Scan Results (mL): 180    • potassium chloride SA  20 mEq BID   • potassium phosphate  30 mmol Once   • Respiratory Therapy Consult   Continuous RT   • Pharmacy Consult Request  1 Each PHARMACY TO DOSE   • docusate sodium  100  mg BID   • senna-docusate  1 Tab Nightly   • senna-docusate  1 Tab Q24HRS PRN   • polyethylene glycol/lytes  1 Packet BID   • magnesium hydroxide  30 mL DAILY   • bisacodyl  10 mg Q24HRS PRN   • fleet  1 Each Once PRN   • NS   Continuous   • acetaminophen  650 mg Q6HRS   • oxyCODONE immediate-release  5 mg Q3HRS PRN   • oxyCODONE immediate release  10 mg Q3HRS PRN   • fentaNYL  50 mcg Q3HRS PRN   • levETIRAcetam  500 mg BID   • ondansetron  4 mg Q4HRS PRN   • famotidine  20 mg BID    Or   • famotidine  20 mg BID   • LORazepam  4 mg Q15 MIN PRN    Or   • LORazepam  3 mg Q15 MIN PRN    Or   • LORazepam  2 mg Q15 MIN PRN    Or   • LORazepam  1 mg Q15 MIN PRN   • thiamine  100 mg DAILY    And   • multivitamin  1 Tab DAILY    And   • folic acid  1 mg DAILY   • labetalol  10 mg Q HOUR PRN    Or   • labetalol  200 mg Q6HRS PRN   • dexamethasone  2 mg Q12HRS   • lisinopril  5 mg DAILY   • levothyroxine  100 mcg QAM   • lidocaine  2 Patch Q24HR       Assessment and Plan:  Hospital day # 2 rt sdh, rt occ bleed vs mass   POD #na  Prophylactic anticoagulation: no         Start date/time: tbd  Brain mri w/ and w/o pending  keppra 500 bid x7 d  Decadron 2 mg Q12, repeat ct for sdh in 2 weeks   Hyponatremia - gently correct  Okay from [facility] to trans out of unit when cleared by trauma.

## 2021-01-22 NOTE — PROGRESS NOTES
"TRAUMA TERTIARY SURVEY     Mental status adequate for full examination?: Awake but falling asleep, current ETOH withdrawl    Spine cleared (radiologically and/or clinically): Yes    PHYSICAL EXAMINATION:  Vitals: BP (!) 98/57   Pulse 94   Temp 36.7 °C (98 °F) (Temporal)   Resp 16   Ht 1.676 m (5' 6\")   Wt 84 kg (185 lb 3 oz)   SpO2 99%   BMI 29.89 kg/m²   Constitutional:     General Appearance: Mild detox symptomes.  HEENT:    Bruising to head, cheek . The pupils are equal, round, and reactive to light bilaterally. The extraocular muscles are intact bilaterally.. The nares and oropharynx are clear. The midface and jaw are stable. No malocclusion is evident.  Neck:    No posterior midline cervical-spine tenderness, no evidence of intoxication, normal level of alertness ([place] Coma Scale 15), no focal neurologic deficit, and no painful distracting injuries.  Respiratory:   Inspection: Unlabored respirations, no intercostal retractions, paradoxical motion, or accessory muscle use.   Palpation:  The chest is nontender. The clavicles are non deformed bilaterally..   Auscultation: normal, clear to auscultation.  Cardiovascular:   Auscultation: normal and regular rate and rhythm.   Peripheral Pulses: Normal.   Abdomen:   Abdomen is soft, nontender, without organomegaly or masses.  Genitourinary:   (FEMALE): normal female external genitalia without lesions.  Musculoskeletal:   The pelvis is stable. Numerous areas of various stages of bruising noted on extremeties   Back:   The thoracolumbar spine was examined. Examination is remarkable for no significant tenderness, swelling, or deformity in the thoracolumbar region.  Skin:   The skin is warm and dry.  Neurologic:    [place] Coma Scale (GCS) 15 E4V5M6. Neurologic examination revealed oriented for age x3. tremulous  Psychiatric:   The patient is mildly anxious  .    IMAGING:  IR-US GUIDED PIV   Final Result    Ultrasound-guided PERIPHERAL IV INSERTION performed by   " "  qualified nursing staff as above.      CT-CHEST,ABDOMEN,PELVIS WITH   Final Result      1.  Multiple RIGHT lateral rib fractures.   2.  Small RIGHT pleural fluid collection with associated atelectasis.   3.  No pneumothorax.   4.  Solid organs of the abdomen are intact.   5.  Fatty infiltration of liver with possible cyst in the medial segment LEFT lobe.   6.  Prior gastric bypass.   7.  No pelvic fracture.   8.  Probable multilevel chronic lower thoracic spine compression deformities.      CT-CSPINE WITHOUT PLUS RECONS   Final Result      1.  No evidence of cervical spine fracture.      2.  5 mm anterior subluxation at C3-4. The      3.  Severe multilevel degenerative disc disease and facet degeneration.      4.  Right-sided pleural effusion.      CT-HEAD W/O   Final Result      1.  Stable RIGHT hemispheric subdural hematoma.   2.  Hyperdense RIGHT occipital mass again seen.   3.  Diffuse atrophy.   4.  No significant change from prior exam.      OUTSIDE IMAGES-CT HEAD   Final Result      MR-BRAIN-WITH & W/O    (Results Pending)     All current laboratory studies/radiology exams reviewed: Yes    Completed Consultations:  neurosurgical     Pending Consultations:  none    Newly Identified Diagnoses and Injuries:  ETOH withdrawal     TOTAL RAP SCORE:  RAP Score Total: 6      Intervention: yes. Patient response to intervention: \"I didnt drink today\".   Patient does not demonstrate understanding of intervention. Patient does not agree to follow-up.   has been contacted.             "

## 2021-01-22 NOTE — THERAPY
Physical Therapy   Initial Evaluation     Patient Name: Jennifer Koroma  Age:  70 y.o., Sex:  female  Medical Record #: [id number]  Today's Date: 1/22/2021     Precautions: (P) Fall Risk    Assessment  Patient is 70 y.o. female with a diagnosis of a subdural hematoma s/p GLF d/t alcohol consumption.  She has a hx of multiple falls in the previous 2 years, and lives alone with no outside support.  The patient presents with deficits in DF and knee extension B, possibly due to inattentiveness.  Her bed mobility is limited and requires maxA to sit EOB and STS.  The patient is unable to stand unsupported, and demonstrates significant posterior lean d/t fear of falling.  She would benefit from skilled therapy services to address bed mobility and transfer deficits, along with assessing gait in future visits.  Recommend placement.  Will follow.    Plan    Recommend Physical Therapy 3 times per week until therapy goals are met for the following treatments:  Bed Mobility, Community Re-integration, Equipment, Gait Training, Manual Therapy, Neuro Re-Education / Balance, Orthotics Training, Self Care/Home Evaluation, Stair Training, Therapeutic Activities and Therapeutic Exercises    DC Equipment Recommendations: (P) Unable to determine at this time  Discharge Recommendations: (P) Recommend post-acute placement for additional physical therapy services prior to discharge home       Subjective/Objective       01/22/21 1055   Prior Living Situation   Prior Services Home-Independent   Housing / Facility 1 Story House   Steps Into Home 4   Equipment Owned Front-Wheel Walker   Lives with - Patient's Self Care Capacity Alone and Able to Care For Self   Prior Level of Functional Mobility   Bed Mobility Independent   Transfer Status Independent   Ambulation Independent   Distance Ambulation (Feet) 250   Assistive Devices Used None   History of Falls   History of Falls Yes   Date of Last Fall   (Reason for admission)   Cognition   "  Cognition / Consciousness X   Level of Consciousness Confused   Comments Poor historian   Strength Lower Body   Lower Body Strength  X   Comments Unable to accurately test DF and knee extension strength d/t poor attention   Sensation Lower Body   Lower Extremity Sensation   WDL   Balance Assessment   Sitting Balance (Static) Fair -   Sitting Balance (Dynamic) Fair -   Standing Balance (Static) Trace   Standing Balance (Dynamic) Trace   Weight Shift Sitting Poor   Weight Shift Standing Poor   Comments Posterior lean during attempt to stand d/t fear of falling   Gait Analysis   Gait Level Of Assist Unable to Participate   Assistive Device None   Comments Limited by posterior lean and pt fear of falling   Bed Mobility    Supine to Sit Maximal Assist   Sit to Supine Maximal Assist   Scooting Maximal Assist   Rolling Maximal Assist to Rt.;Maximum Assist to Lt.   Functional Mobility   Sit to Stand Total Assist   How much difficulty does the patient currently have...   Turning over in bed (including adjusting bedclothes, sheets and blankets)? 1   Sitting down on and standing up from a chair with arms (e.g., wheelchair, bedside commode, etc.) 1   Moving from lying on back to sitting on the side of the bed? 1   How much help from another person does the patient currently need...   Moving to and from a bed to a chair (including a wheelchair)? 1   Need to walk in a hospital room? 1   Climbing 3-5 steps with a railing? 1   6 clicks Mobility Score 6   Activity Tolerance   Sitting Edge of Bed 10min   Standing 2min   Edema / Skin Assessment   Edema / Skin  WDL   Short Term Goals    Short Term Goal # 1 Pt will demonstrate bed mobility using railings and Michele for LE's in 6 visits to prepare for transfer tasks.   Short Term Goal # 2 Pt will demonstrate ability to STS EOB spv w/ FWW for safety in 6 visits to prepare for ambulation.   Short Term Goal # 3 Pt will demonstrate ability to ascend/descend 4 stairs (4\") using UE support on " lila camarena in 6 visits to be able to enter/exit home.   Education Group   Education Provided Role of Physical Therapist   Role of Physical Therapist Patient Response Patient;Acceptance;Explanation;Demonstration;Verbal Demonstration;Action Demonstration   Additional Comments Pt receptive and willing to participate, but limited by fear of falling   Problem List    Problems Impaired Bed Mobility;Impaired Transfers;Impaired Ambulation;Impaired Balance;Safety Awareness Deficits / Cognition;Motor Planning / Sequencing   Anticipated Discharge Equipment and Recommendations   DC Equipment Recommendations Unable to determine at this time   Discharge Recommendations Recommend post-acute placement for additional physical therapy services prior to discharge home

## 2021-01-22 NOTE — PROGRESS NOTES
Patient arrived to [facility].     Partial bed bath completed, vital signs stable. Patient tremulous and confused at times. Incontinent of urine, attempted to place purewick, patient refusing.    Skin issues:  Scattering bruising to knees, bilateral flanks, groins, chin, legs, redness to dina area and sacrum, cream applied. Will ask night shift to take photos. Eyes juandice. Dry callous scabs to fingers and toes

## 2021-01-22 NOTE — ED PROVIDER NOTES
ED Provider Note    CHIEF COMPLAINT  Chief Complaint   Patient presents with   • Sent by MD     4mm Right lateral SDH. Pt denies falls in the past week. Some old brusing noted to chin and chest.    • ETOH Withdrawal     last drink 3 days ago. Given ativan, NS 1000mL and magnesium 4g prior to arrival       HPI  Jennifer Koroma is a 70 y.o. female who presents to the emergency department complaining of subdural hematoma.  The patient has a history of alcohol abuse and she has had frequent falls over the last several days.  She has had multiple injuries and bruising.  She at some point she hit her head.  She went to the hospital at [facility] and was found to have a subdural hematoma.  Labs showed multiple abnormalities which is sent here for further evaluation.  She is known to be in alcohol withdrawal have multiple electrolyte laboratory abnormalities.  She complains of pain in her back, denies any neck pain.  Denies any numbness or weakness in arms or legs.  She does states she has a tremor in her legs and she feels shaky.  He has received Ativan for alcohol withdrawal symptoms.    No blood thinner use.    REVIEW OF SYSTEMS  See HPI for further details. All other systems are negative.    PAST MEDICAL HISTORY  Past Medical History:   Diagnosis Date   • Thyroid disease        FAMILY HISTORY  No family history on file.    SOCIAL HISTORY  Social History     Socioeconomic History   • Marital status:      Spouse name: Not on file   • Number of children: Not on file   • Years of education: Not on file   • Highest education level: Not on file   Occupational History   • Not on file   Social Needs   • Financial resource strain: Not on file   • Food insecurity     Worry: Not on file     Inability: Not on file   • Transportation needs     Medical: Not on file     Non-medical: Not on file   Tobacco Use   • Smoking status: Never Smoker   • Smokeless tobacco: Never Used   Substance and Sexual Activity   •  Alcohol use: Yes   • Drug use: No   • Sexual activity: Not on file   Lifestyle   • Physical activity     Days per week: Not on file     Minutes per session: Not on file   • Stress: Not on file   Relationships   • Social connections     Talks on phone: Not on file     Gets together: Not on file     Attends [employer] service: Not on file     Active member of club or organization: Not on file     Attends meetings of clubs or organizations: Not on file     Relationship status: Not on file   • Intimate partner violence     Fear of current or ex partner: Not on file     Emotionally abused: Not on file     Physically abused: Not on file     Forced sexual activity: Not on file   Other Topics Concern   • Not on file   Social History Narrative   • Not on file       SURGICAL HISTORY  Past Surgical History:   Procedure Laterality Date   • HUSSAIN BY LAPAROSCOPY     • HYSTERECTOMY LAPAROSCOPY         CURRENT MEDICATIONS  Home Medications     Reviewed by Mynor Salcido Out, PhT (Pharmacy Tech) on 01/21/21 at 1533  Med List Status: Complete   Medication Last Dose Status   acetaminophen (TYLENOL) 325 MG Tab Not Taking Active   B Complex Vitamins (VITAMIN B COMPLEX PO) 1/20/2021 Active   bisacodyl (DULCOLAX) 10 MG Suppos Not Taking Active   docusate sodium 100 MG Cap Not Taking Active   enoxaparin (LOVENOX) 30 MG/0.3ML Solution inj Not Taking Active   fludrocortisone (FLORINEF) 0.1 MG Tab Not Taking Active   haloperidol lactate (HALDOL) 5 MG/ML Solution Not Taking Active   labetalol (NORMODYNE,TRANDATE) 5 MG/ML Solution Not Taking Active   levothyroxine (SYNTHROID) 100 MCG Tab 1/20/2021 Active   levothyroxine (SYNTHROID) 75 MCG Tab Not Taking Active   lisinopril (PRINIVIL) 5 MG Tab 1/20/2021 Active   lisinopril (PRINIVIL, ZESTRIL) 40 MG tablet Not Taking Active   magnesium hydroxide (MILK OF MAGNESIA) 400 MG/5ML Suspension couple days ago Active   ondansetron (ZOFRAN) 4 MG/2ML Solution injection Not Taking Active   Pharmacy  "Consult Request Not Taking Active   polyethylene glycol/lytes (MIRALAX) Pack Not Taking Active   senna-docusate (PERICOLACE OR SENOKOT S) 8.6-50 MG Tab Not Taking Active   senna-docusate (PERICOLACE OR SENOKOT S) 8.6-50 MG Tab Not Taking Active   sodium chloride (SALT) 1 GM Tab Not Taking Active   Sodium Phosphates (FLEET) 7-19 GM/118ML Enema Not Taking Active                ALLERGIES  Allergies   Allergen Reactions   • Codeine Unspecified     \"like a heartattack\"   • Morphine Unspecified     \"like a heartattack\"       PHYSICAL EXAM  VITAL SIGNS: /87   Pulse (!) 112   Temp 37 °C (98.6 °F) (Oral)   Resp 18   Ht 1.676 m (5' 6\")   Wt 95.3 kg (210 lb)   SpO2 98%   BMI 33.89 kg/m²    Constitutional: Awake, ill-appearing, mild distress.  HENT: Normocephalic, Atraumatic, Bilateral external ears normal,   Eyes: PERRL, EOMI, Conjunctiva normal, No discharge.   Neck: Normal range of motion, no midline tenderness.  Cardiovascular: Normal heart rate, Normal rhythm, No murmurs, No rubs, No gallops.   Thorax & Lungs: Normal breath sounds, No respiratory distress,  Abdomen: Bowel sounds normal, Soft, No tenderness, audible bruises on her back and torso.  Skin: Warm, Dry, No erythema, No rash.  Areas present multiple ages on her chest and abdomen and back  Back: No tenderness, No CVA tenderness.   Musculoskeletal: Good range of motion in all major joints.  Mild edema, various bruises  Neurologic: Alert, tremulous no focal deficits noted.   Psychiatric: Affect anxious    Results for orders placed or performed during the hospital encounter of 01/21/21   CBC WITH DIFFERENTIAL   Result Value Ref Range    WBC 9.2 4.8 - 10.8 K/uL    RBC 2.91 (L) 4.20 - 5.40 M/uL    Hemoglobin 10.1 (L) 12.0 - 16.0 g/dL    Hematocrit 29.0 (L) 37.0 - 47.0 %    MCV 99.7 (H) 81.4 - 97.8 fL    MCH 34.7 (H) 27.0 - 33.0 pg    MCHC 34.8 33.6 - 35.0 g/dL    RDW 53.6 (H) 35.9 - 50.0 fL    Platelet Count 165 164 - 446 K/uL    MPV 10.4 9.0 - 12.9 fL    " Neutrophils-Polys 84.90 (H) 44.00 - 72.00 %    Lymphocytes 9.40 (L) 22.00 - 41.00 %    Monocytes 5.10 0.00 - 13.40 %    Eosinophils 0.00 0.00 - 6.90 %    Basophils 0.10 0.00 - 1.80 %    Immature Granulocytes 0.50 0.00 - 0.90 %    Nucleated RBC 0.00 /100 WBC    Neutrophils (Absolute) 7.77 (H) 2.00 - 7.15 K/uL    Lymphs (Absolute) 0.86 (L) 1.00 - 4.80 K/uL    Monos (Absolute) 0.47 0.00 - 0.85 K/uL    Eos (Absolute) 0.00 0.00 - 0.51 K/uL    Baso (Absolute) 0.01 0.00 - 0.12 K/uL    Immature Granulocytes (abs) 0.05 0.00 - 0.11 K/uL    NRBC (Absolute) 0.00 K/uL   COMP METABOLIC PANEL   Result Value Ref Range    Sodium 125 (L) 135 - 145 mmol/L    Potassium 3.4 (L) 3.6 - 5.5 mmol/L    Chloride 91 (L) 96 - 112 mmol/L    Co2 21 20 - 33 mmol/L    Anion Gap 13.0 7.0 - 16.0    Glucose 138 (H) 65 - 99 mg/dL    Bun 6 (L) 8 - 22 mg/dL    Creatinine 0.73 0.50 - 1.40 mg/dL    Calcium 8.5 8.5 - 10.5 mg/dL    AST(SGOT) 253 (H) 12 - 45 U/L    ALT(SGPT) 127 (H) 2 - 50 U/L    Alkaline Phosphatase 246 (H) 30 - 99 U/L    Total Bilirubin 4.0 (H) 0.1 - 1.5 mg/dL    Albumin 3.3 3.2 - 4.9 g/dL    Total Protein 6.3 6.0 - 8.2 g/dL    Globulin 3.0 1.9 - 3.5 g/dL    A-G Ratio 1.1 g/dL   LIPASE   Result Value Ref Range    Lipase 18 11 - 82 U/L   APTT   Result Value Ref Range    APTT 30.3 24.7 - 36.0 sec   PROTHROMBIN TIME (INR)   Result Value Ref Range    PT 14.5 12.0 - 14.6 sec    INR 1.10 0.87 - 1.13   COV-2, FLU A/B, AND RSV BY PCR (2-4 HOURS CEPHEID): Collect NP swab in VTM    Specimen: Nasopharyngeal; Respirate   Result Value Ref Range    SARS-CoV-2 Source NP Swab    ESTIMATED GFR   Result Value Ref Range    GFR If African American >60 >60 mL/min/1.73 m 2    GFR If Non African American >60 >60 mL/min/1.73 m 2        EKG: EKG obtained today at 1453    Twelve-lead EKG obtained today shows sinus tachycardia rate of 117.  KY QRS and QT intervals are normal axis normal R progression is normal.  No pathologic pain ST elevation or  "depression.    Impression: Sinus tachycardia otherwise unremarkable EKG.    RADIOLOGY/PROCEDURES  OUTSIDE IMAGES-CT HEAD   Final Result      CT-CHEST,ABDOMEN,PELVIS WITH    (Results Pending)   CT-CSPINE WITHOUT PLUS RECONS    (Results Pending)   CT-HEAD W/O    (Results Pending)         COURSE & MEDICAL DECISION MAKING  Pertinent Labs & Imaging studies reviewed. (See chart for details)    The patient presents here with a subdural hematoma and incomplete radiographic work-up for trauma.  She has multiple bruises and injuries throughout her body and her neck is not clinically clearable because of her mental status.  Therefore better repeat labs and additional imaging.    The patient was worked up at the transferring hospital labs reviewed.  Labs show low hemoglobin.  Platelet count is normal.  Electrolytes show sodium 125, potassium 3.3, chloride of 9, CO2 of 19, gap of 16.  Glucose is slightly elevated.  She has markedly elevated LFTs including elevated bilirubin.  Her INR there was 1.1.    I have ordered repeat labs and a tagged.  Her magnesium was low to be fairly low.      I have ordered a detox bag because of the patient's tachycardia, the fact that she must remain n.p.o.\" her electrolytes and magnesium.  She will be reassessed after this.  She is reassessed partway through and is proving.      The patient will have repeat labs.  I have added a chest and pelvis CT and neck CT in the trauma surgeon has ordered a repeat head CT.  The patient is awake and alert has overt signs of alcohol withdrawal which is treated with IV Ativan.    I have discussed the case with Dr. Trimble on-call for neurosurgery, he will see the patient.  Recommends ICU hospitalization to the trauma surgery service.    I discussed the case with the trauma surgeon and he will see the patient.  Care is transferred that time.  CTs of the chest and pelvis repeat head CT neck CT are pending.      FINAL IMPRESSION  1. Subdural hematoma (HCC)     2. " Alcohol withdrawal syndrome, with delirium (HCC)     3. Hypomagnesemia     4. Hyponatremia     5. LFT elevation     6 critical care time 40 minutes no procedures             Electronically signed by: Jw Medina M.D., 1/21/2021 4:25 PM

## 2021-01-22 NOTE — CONSULTS
DATE OF SERVICE:  01/21/2021     NEUROSURGICAL CONSULTATION     HISTORY OF PRESENT ILLNESS:  The patient is a pleasant 70-year-old female with   a history of alcohol abuse, who was transferred from an outside hospital.    She has had multiple falls over the past several days, multiple injuries and   bruising.  She did hit her head at some point, but was not able to remember   when.  She went to [facility] and was found to have a subdural then   she was transferred to [facility] for definitive care.  She is also known to   currently be in alcohol withdrawal.  She is not complaining of any weakness,   numbness, or tingling.  She does have back pain.     PAST MEDICAL HISTORY:  Thyroid disease and ETOH abuse.     FAMILY HISTORY:  Noncontributory.     SOCIAL HISTORY:  As above.     PAST SURGICAL HISTORY:  Lap elsie, lap hysterectomy, and gastric bypass.     MEDICATIONS:  At home, Tylenol, B complex, Dulcolax, Lovenox 30 b.i.d., which   she is not taking, levothyroxine, Prinivil.     ALLERGIES:  CODEINE AND MORPHINE.     PHYSICAL EXAMINATION:  GENERAL:  A and O x2-3.  HEENT:  Pupils equal, round, reactive to light.  Extraocular muscles intact.    Tongue midline.  Face symmetric.  NEUROLOGIC:  Motor is 5/5 strength in all muscle groups in the upper and lower   extremities.  Sensory grossly intact to light touch.     LABORATORY VALUES:  CBC significant for white count of 9.2, hemoglobin 10.1,   platelets 165.  Basic metabolic panel:  Sodium 125, K 3.4, chloride 91,   glucose 138, creatinine 0.73.  INR 1.1, PTT 30.3.     IMAGING:  CT scan of the head, noncontrast, shows a right-sided 4 mm fairly   holohemispheric subdural hematoma.  She has a hyperdense right-sided occipital   mass, which is stable to previous scan and could be consistent with an   intracerebral hemorrhage, but it does appear to be somewhat calcified.  This   scan is unchanged compared to initial scan at [facility] 4-1/2   hours ago.  CT  scan of the chest, abdomen, and pelvis shows no solid organ   injury, no pneumothorax.  She does have some right pleural fluid collection   and atelectasis and she has multiple right-sided lateral rib fractures.  CT   scan of the cervical spine shows a 5 mm anterolisthesis of cervical 3 and 4,   which appears to be chronic as she is fused at bilateral facet joints at   cervical 3-4.     PLAN:  With respect to the patient's head injury she does not need any   additional CAT scan given the stability of her initial CAT scan and her good   exam.  We will do every 2-hour neuro check tonight and then if she looked   stable in the morning, she can go to the floor.  With respect to her   right-sided occipital bleed versus mass we should get an MRI of the brain with   and without contrast to further characterize this.  We will also keep her on   Decadron 2 mg twice a day for the subdural and repeat the CAT scan in about   two weeks as long as she remains clinically stable.  With respect to her   hyponatremia, this is likely chronic and secondary to medications and ETOH   abuse.  She does not need to be on 3% protocol, but would try to very gently   correct this.  We will start with fluids and then she may need salt tabs   and/or Florinef at this point, but we will go very slowly.  We will discuss   with trauma.  With respect to the subdural, we will keep her systolic less   than 160.  Keppra 500 b.i.d. x7 days.  Avoid NSAIDs or aspirin.           ______________________________  MD EMRE MACDONALD/SHANTHI/MIGUEL ÁNGEL    DD:  01/21/2021 17:11  DT:  01/21/2021 17:51    Job#:  [id number]

## 2021-01-23 NOTE — CARE PLAN
Problem: Safety  Goal: Will remain free from injury  Outcome: PROGRESSING AS EXPECTED  Goal: Will remain free from falls  Outcome: PROGRESSING AS EXPECTED     Problem: Mobility  Goal: Risk for activity intolerance will decrease  Outcome: PROGRESSING AS EXPECTED  Note: Patient sat edge of bed for 15 minutes     Problem: Respiratory:  Goal: Respiratory status will improve  Outcome: PROGRESSING SLOWER THAN EXPECTED  Note: Patient began having expiratory wheezes, RT notified, Duoneb given.

## 2021-01-23 NOTE — THERAPY
01/23/21 1145   Interdisciplinary Plan of Care Collaboration   Collaboration Comments Cognitive orders received and acknowledged. Per RN, pt is withdrawing from alcohol. Will hold and re-attempt as pt is appropriate.

## 2021-01-23 NOTE — PROGRESS NOTES
Noted that patient had some expiratory wheezes bilaterally during 1200 assessment.  In addition, she was tachypneic at 30 breaths a minute and using accessory muscles.  Notified RT who came and assessed patient and administered a duoneb.  Patient denies any history of smoking or asthma.

## 2021-01-23 NOTE — PROGRESS NOTES
"Located both Daughter (Brigida) and Son (Gary) phone numbers in the chart and placed phone calls to both.  Neither were aware that patient had been hospitalized. After talking with both of them it appears that they have both become estranged due to the patient's alcoholism.  In addition, both expressed how this has been a reoccurring issue for the patient.  They stated that she had been admitted to [facility] for fall in summer of 2019 and her stay was complicated not only with withdrawal from alcohol but also from unprescribed prescription medications.  Brigida stated that the patient at that time was ordering unprescribed pain and anxiety medications through the internet from somewhere in [facility] and she feels that they were laced with Fentanyl.  Brigida stated that she was not sure if the patient was still ordering and taking these medications but wanted to make sure we were aware.  Brigida also stated that the patient has been to rehabilitation facilities and will become sober but shortly after discharge from these facilities she will begin drinking alcohol again.  As she drinks, she continues to fall at home, and has been known to call the [place] Police as much as two times a day to assist her in getting up off the floor.  Brigida, who is the [facility] Power of , expressed her frustration with the vicious [place] that continues to occur.  She stated \"I love my mom and I wish nothing more for her to be happy and healthy.  I am here to assist you in any way to take care of her.  But, her alcoholism has consumed my life too much and we have not talked since probably October of last year due to it\".  Brigida states she would like to deem the patient unable to care for herself and place her in a facility that can help her, but once the patient moy up and is oriented Brigida is unable to make decisions for her and the patient just continues to drink.  I notified the  of this patient's situation " and she stated she would follow up.

## 2021-01-23 NOTE — PROGRESS NOTES
Trauma / Surgical Daily Progress Note    Date of Service  1/23/2021    Chief Complaint  70 y.o. female admitted 1/21/2021 with Subdural hematoma    Interval Events  Neurologically stable, sedated for alcohol withdrawal  MRI brain pending  Alcohol withdrawal manifested, needs constant nursing observation  Major electrolyte imbalance addressed  Fluid restriction for hyponatremia   Repletion Phos  Tertiary survey completed, negative   Suspected cirrhosis with high Meld score     The patient remains critically injured with severe closed head injury and Acute alcohol withdrawal syndrome, with underlying comorbidities.  The patient was seen and examined on rounds and discussed with the multidisciplinary critical care team and consulting physicians. Critically evaluated laboratory tests, culture data, medications, imaging, and other diagnostic tests.    The patient has impairment of one or more vital organ systems and a high probability of imminent or life-threatening deterioration in condition. Provided high complexity decision making to assess, manipulate, and support vital system functions to treat vital organ system failure and/or to prevent further life-threatening deterioration of the patient's condition. Requires continued ICU and hospital admission.    Critical care interventions include: integration of multiple data points and associated complex medical decision making, management of intracranial hypertension and management of critical electrolyte abnormalities.      Review of Systems  Review of Systems   HENT: Positive for hearing loss.    Neurological: Positive for headaches.   Psychiatric/Behavioral: Positive for agitation and hallucinations.   All other systems reviewed and are negative.       Vital Signs for last 24 hours  Temp:  [36.2 °C (97.1 °F)-37.1 °C (98.7 °F)] 36.8 °C (98.2 °F)  Pulse:  [46-97] 94  Resp:  [16-60] 30  BP: ()/(54-88) 107/64  SpO2:  [83 %-100 %] 99 %    Hemodynamic parameters for  "last 24 hours       Respiratory Data     Respiration: (!) 30, Pulse Oximetry: 99 %     Given By:: Mouthpiece, Work Of Breathing / Effort: Accessory Muscle Use;Increased Work of Breathing  RUL Breath Sounds: Diminished;Expiratory Wheezes, RML Breath Sounds: Diminished;Expiratory Wheezes, RLL Breath Sounds: Diminished, CAIN Breath Sounds: Diminished;Expiratory Wheezes, LLL Breath Sounds: Diminished    Physical Exam  Physical Exam  Vitals signs and nursing note reviewed. Exam conducted with a chaperone present.     Vitals: BP (!) 98/57   Pulse 94   Temp 36.7 °C (98 °F) (Temporal)   Resp 16   Ht 1.676 m (5' 6\")   Wt 84 kg (185 lb 3 oz)   SpO2 99%   BMI 29.89 kg/m²   Constitutional:                General Appearance: Mild detox symptomes.  HEENT:               Bruising to head, cheek . The pupils are equal, round, and reactive to light bilaterally. The extraocular muscles are intact bilaterally.. The nares and oropharynx are clear. The midface and jaw are stable. No malocclusion is evident.  Neck:               No posterior midline cervical-spine tenderness, no evidence of intoxication, normal level of alertness ([place] Coma Scale 15), no focal neurologic deficit, and no painful distracting injuries.  Respiratory:              Inspection: Unlabored respirations, no intercostal retractions, paradoxical motion, or accessory muscle use.              Palpation:  The chest is nontender. The clavicles are non deformed bilaterally..              Auscultation: normal, clear to auscultation.  Cardiovascular:              Auscultation: normal and regular rate and rhythm.              Peripheral Pulses: Normal.   Abdomen:              Abdomen is soft, nontender, without organomegaly or masses.  Genitourinary:              (FEMALE): normal female external genitalia without lesions.  Musculoskeletal:              The pelvis is stable. Numerous areas of various stages of bruising noted on extremeties   Back:              The " thoracolumbar spine was examined. Examination is remarkable for no significant tenderness, swelling, or deformity in the thoracolumbar region.  Skin:              The skin is warm and dry.  Neurologic:               [place] Coma Scale (GCS) 15 [id number]. Neurologic examination revealed oriented for age x3. tremulous  Psychiatric:              The patient is mildly anxious  .    Laboratory  Recent Results (from the past 24 hour(s))   CBC with Differential: Tomorrow AM    Collection Time: 01/23/21  3:35 AM   Result Value Ref Range    WBC 4.6 (L) 4.8 - 10.8 K/uL    RBC 2.39 (L) 4.20 - 5.40 M/uL    Hemoglobin 8.7 (L) 12.0 - 16.0 g/dL    Hematocrit 24.6 (L) 37.0 - 47.0 %    .9 (H) 81.4 - 97.8 fL    MCH 36.4 (H) 27.0 - 33.0 pg    MCHC 35.4 (H) 33.6 - 35.0 g/dL    RDW 56.8 (H) 35.9 - 50.0 fL    Platelet Count 123 (L) 164 - 446 K/uL    MPV 10.7 9.0 - 12.9 fL    Neutrophils-Polys 75.20 (H) 44.00 - 72.00 %    Lymphocytes 15.40 (L) 22.00 - 41.00 %    Monocytes 7.40 0.00 - 13.40 %    Eosinophils 1.10 0.00 - 6.90 %    Basophils 0.70 0.00 - 1.80 %    Immature Granulocytes 0.20 0.00 - 0.90 %    Nucleated RBC 0.00 /100 WBC    Neutrophils (Absolute) 3.47 2.00 - 7.15 K/uL    Lymphs (Absolute) 0.71 (L) 1.00 - 4.80 K/uL    Monos (Absolute) 0.34 0.00 - 0.85 K/uL    Eos (Absolute) 0.05 0.00 - 0.51 K/uL    Baso (Absolute) 0.03 0.00 - 0.12 K/uL    Immature Granulocytes (abs) 0.01 0.00 - 0.11 K/uL    NRBC (Absolute) 0.00 K/uL   Comp Metabolic Panel (CMP): Tomorrow AM    Collection Time: 01/23/21  3:35 AM   Result Value Ref Range    Sodium 128 (L) 135 - 145 mmol/L    Potassium 4.2 3.6 - 5.5 mmol/L    Chloride 98 96 - 112 mmol/L    Co2 21 20 - 33 mmol/L    Anion Gap 9.0 7.0 - 16.0    Glucose 117 (H) 65 - 99 mg/dL    Bun 4 (L) 8 - 22 mg/dL    Creatinine 0.53 0.50 - 1.40 mg/dL    Calcium 7.6 (L) 8.5 - 10.5 mg/dL    AST(SGOT) 193 (H) 12 - 45 U/L    ALT(SGPT) 88 (H) 2 - 50 U/L    Alkaline Phosphatase 202 (H) 30 - 99 U/L    Total Bilirubin 2.6  (H) 0.1 - 1.5 mg/dL    Albumin 2.7 (L) 3.2 - 4.9 g/dL    Total Protein 5.4 (L) 6.0 - 8.2 g/dL    Globulin 2.7 1.9 - 3.5 g/dL    A-G Ratio 1.0 g/dL   ESTIMATED GFR    Collection Time: 01/23/21  3:35 AM   Result Value Ref Range    GFR If African American >60 >60 mL/min/1.73 m 2    GFR If Non African American >60 >60 mL/min/1.73 m 2   PHOSPHORUS    Collection Time: 01/23/21  3:35 AM   Result Value Ref Range    Phosphorus 2.3 (L) 2.5 - 4.5 mg/dL   MAGNESIUM    Collection Time: 01/23/21  3:35 AM   Result Value Ref Range    Magnesium 1.8 1.5 - 2.5 mg/dL       Fluids    Intake/Output Summary (Last 24 hours) at 1/23/2021 1548  Last data filed at 1/23/2021 1230  Gross per 24 hour   Intake 3499.8 ml   Output 685 ml   Net 2814.8 ml       Core Measures & Quality Metrics  Core Measures & Quality Metrics    ADDY Score    ETOH Screening      Assessment/Plan  Traumatic subdural hematoma (HCC)- (present on admission)  Assessment & Plan  4mm right lateral subdural hematoma without mass-effect.  Repeat interval CT imaging of the brain stable.  MR imaging of the brain to better characterize when withdrawal symptoms permit.  Non-operative management.  Post traumatic pharmacologic seizure prophylaxis for 1 week.  Speech Language Pathology cognitive evaluation.  Maikol Trimble MD. Neurosurgeon. [facility].     Hypophosphatemia- (present on admission)  Assessment & Plan  Replace with 30 mmol KPhos and trend.    Hypokalemia- (present on admission)  Assessment & Plan  Profound hypokalemia.    Replete with oral supplementation and trend.  Empiric magnesium replacement.    Screening examination for infectious disease- (present on admission)  Assessment & Plan  Admission SARS-CoV-2 testing negative. Repeat SARS-CoV-2 testing not indicated. Isolation precautions de-escalated.    Contraindication to deep vein thrombosis (DVT) prophylaxis- (present on admission)  Assessment & Plan  Systemic anticoagulation contraindicated  secondary to elevated bleeding risk.  1/23 Trauma screening duplex ordered.    History of alcohol abuse- (present on admission)  Assessment & Plan  History of alcohol abuse with withdrawal symptoms noted in Emergency Department  Alcohol withdrawal protocol initiated on admission    Hyponatremia- (present on admission)  Assessment & Plan  Asymptomatic, likely longstanding hyponatremia.  Resuscitate with normal saline and trend.    Essential hypertension- (present on admission)  Assessment & Plan  Chronic condition treated with lisinopril.  Resumed maintenance medication on admission.    Hypothyroid- (present on admission)  Assessment & Plan  Chronic condition treated with levothyroxine  Resumed maintenance medication on admission.       Discussed patient condition with RN, RT,  and Patient.  CRITICAL CARE TIME EXCLUDING PROCEDURES: 41    minutes

## 2021-01-23 NOTE — PROGRESS NOTES
Neurosurgery Progress Note    Subjective:  Received Ativan recently for alcohol withdraw    Exam:  Pupils 3 mm midline reactive  Reaches up bilaterally purposefully  Moves all extremities purposefully    BP  Min: 86/54  Max: 145/69  Pulse  Av.2  Min: 46  Max: 97  Resp  Av.8  Min: 16  Max: 60  Temp  Av.8 °C (98.2 °F)  Min: 36.2 °C (97.1 °F)  Max: 37.1 °C (98.7 °F)  SpO2  Av.8 %  Min: 83 %  Max: 100 %    No data recorded    Recent Labs     21  1510 21  0530 21  0335   WBC 9.2 5.0 4.6*   RBC 2.91* 2.35* 2.39*   HEMOGLOBIN 10.1* 8.2* 8.7*   HEMATOCRIT 29.0* 23.8* 24.6*   MCV 99.7* 101.3* 102.9*   MCH 34.7* 34.9* 36.4*   MCHC 34.8 34.5 35.4*   RDW 53.6* 53.8* 56.8*   PLATELETCT 165 109* 123*   MPV 10.4 10.3 10.7     Recent Labs     21  1510 21  0530 21  0335   SODIUM 125* 126* 128*   POTASSIUM 3.4* 2.7* 4.2   CHLORIDE 91* 91* 98   CO2    GLUCOSE 138* 114* 117*   BUN 6* 5* 4*   CREATININE 0.73 0.69 0.53   CALCIUM 8.5 7.5* 7.6*     Recent Labs     21  1510   APTT 30.3   INR 1.10     Recent Labs     21  1510   REACTMIN 5.4   CLOTKINET 1.4   CLOTANGL 70.0   MAXCLOTS 61.1   [id number] 0.4   PRCINADP 56.1   PRCINAA 28.7       Intake/Output       21 - 21 0659 21 - 2159       Total [id number] Total       Intake    I.V.  2196.7  -- 2196.7  1300  -- 1300    Magnesium Sulfate Volume 50 -- 50 -- -- --    Volume (mL) (NS infusion) 2146.7 -- 2146.7 1300 -- 1300    IV Piggyback  499.8  -- 499.8  --  -- --    Volume (mL) (potassium phosphate IVPB 30 mmol in 500 mL D5W (premix)) 499.8 -- 499.8 -- -- --    Total Intake 2696.5 -- 2696.5 1300 -- 1300       Output    Urine  425  325 750  40  -- 40    Output (mL) (Urethral Catheter 16 Fr.) 425 325 750 40 -- 40    Stool  --  -- --  --  -- --    Number of Times Stooled -- 1 x 1 x -- -- --    Total Output 425 325 750 40 -- 40       Net I/O     2271.5 -325  1946.5 1260 -- 1260            Intake/Output Summary (Last 24 hours) at 1/23/2021 0855  Last data filed at 1/23/2021 0800  Gross per 24 hour   Intake 2949.8 ml   Output 790 ml   Net 2159.8 ml       $ Bladder Scan Results (mL): 393    • potassium chloride SA  20 mEq BID   • Respiratory Therapy Consult   Continuous RT   • Pharmacy Consult Request  1 Each PHARMACY TO DOSE   • docusate sodium  100 mg BID   • senna-docusate  1 Tab Nightly   • senna-docusate  1 Tab Q24HRS PRN   • polyethylene glycol/lytes  1 Packet BID   • magnesium hydroxide  30 mL DAILY   • bisacodyl  10 mg Q24HRS PRN   • fleet  1 Each Once PRN   • acetaminophen  650 mg Q6HRS   • oxyCODONE immediate-release  5 mg Q3HRS PRN   • oxyCODONE immediate release  10 mg Q3HRS PRN   • fentaNYL  50 mcg Q3HRS PRN   • levETIRAcetam  500 mg BID   • ondansetron  4 mg Q4HRS PRN   • famotidine  20 mg BID    Or   • famotidine  20 mg BID   • LORazepam  4 mg Q15 MIN PRN    Or   • LORazepam  3 mg Q15 MIN PRN    Or   • LORazepam  2 mg Q15 MIN PRN    Or   • LORazepam  1 mg Q15 MIN PRN   • thiamine  100 mg DAILY    And   • multivitamin  1 Tab DAILY    And   • folic acid  1 mg DAILY   • labetalol  10 mg Q HOUR PRN    Or   • labetalol  200 mg Q6HRS PRN   • dexamethasone  2 mg Q12HRS   • lisinopril  5 mg DAILY   • levothyroxine  100 mcg QAM   • lidocaine  2 Patch Q24HR       Assessment and Plan:  Hospital day #1 contusion/SDH  POD #NA  Prophylactic anticoagulation: no         Start date/time: tbd  Continue observation  SDH with minimal mass effect  Correct hyponatremia, keep Na 138-145

## 2021-01-24 PROBLEM — F10.10 ALCOHOL ABUSE: Status: RESOLVED | Noted: 2019-07-09 | Resolved: 2021-01-01

## 2021-01-24 PROBLEM — M25.512 LEFT SHOULDER PAIN: Status: RESOLVED | Noted: 2019-07-11 | Resolved: 2021-01-01

## 2021-01-24 PROBLEM — Z78.9 NO CONTRAINDICATION TO DEEP VEIN THROMBOSIS (DVT) PROPHYLAXIS: Status: RESOLVED | Noted: 2019-07-09 | Resolved: 2021-01-01

## 2021-01-24 PROBLEM — F13.931 BENZODIAZEPINE WITHDRAWAL WITH DELIRIUM (HCC): Status: RESOLVED | Noted: 2019-07-15 | Resolved: 2021-01-01

## 2021-01-24 PROBLEM — F10.939 ALCOHOL WITHDRAWAL SYNDROME WITH COMPLICATION (HCC): Status: RESOLVED | Noted: 2021-01-01 | Resolved: 2021-01-01

## 2021-01-24 PROBLEM — S06.340A TRAUMATIC HEMORRHAGE OF RIGHT CEREBRUM WITHOUT LOSS OF CONSCIOUSNESS (HCC): Status: RESOLVED | Noted: 2019-07-09 | Resolved: 2021-01-01

## 2021-01-24 PROBLEM — N39.0 UTI (URINARY TRACT INFECTION): Status: RESOLVED | Noted: 2019-07-09 | Resolved: 2021-01-01

## 2021-01-24 NOTE — PROGRESS NOTES
Neurosurgery Progress Note    Subjective:  Received Ativan recently for alcohol withdraw    Exam:  Pupils 3 mm midline reactive  Reaches up bilaterally purposefully  Moves all extremities purposefully slow    BP  Min: 91/55  Max: 153/96  Pulse  Av  Min: 70  Max: 102  Resp  Av  Min: 13  Max: 42  Temp  Av.9 °C (98.5 °F)  Min: 36.3 °C (97.4 °F)  Max: 37.4 °C (99.3 °F)  SpO2  Av.6 %  Min: 96 %  Max: 100 %    No data recorded    Recent Labs     21  0530 21  0335 21  033   WBC 5.0 4.6* 4.8   RBC 2.35* 2.39* 2.68*   HEMOGLOBIN 8.2* 8.7* 9.6*   HEMATOCRIT 23.8* 24.6* 28.7*   .3* 102.9* 107.1*   MCH 34.9* 36.4* 35.8*   MCHC 34.5 35.4* 33.4*   RDW 53.8* 56.8* 61.7*   PLATELETCT 109* 123* 137*   MPV 10.3 10.7 10.4     Recent Labs     21  0530 21  0335 21  033   SODIUM 126* 128* 135   POTASSIUM 2.7* 4.2 4.8   CHLORIDE 91* 98 104   CO2    GLUCOSE 114* 117* 113*   BUN 5* 4* 4*   CREATININE 0.69 0.53 0.61   CALCIUM 7.5* 7.6* 8.1*     Recent Labs     21  1510   APTT 30.3   INR 1.10     Recent Labs     21  1510   REACTMIN 5.4   CLOTKINET 1.4   CLOTANGL 70.0   MAXCLOTS 61.1   [id number] 0.4   PRCINADP 56.1   PRCINAA 28.7       Intake/Output       21 - 2159 21 - 21 0659      8190-6178 5225-2833 Total 4451-0400 0134-4141 Total       Intake    I.V.    --   --  -- --    Volume (mL) (NS infusion) 1850 -- 1850 -- -- --    IV Piggyback  --  499.8 499.8  --  -- --    Volume (mL) (potassium phosphate IVPB 30 mmol in 500 mL D5W (premix)) -- 499.8 499.8 -- -- --    Total Intake 1850 499.8 2349.8 -- -- --       Output    Urine  385  405 790  47  -- 47    Output (mL) (Urethral Catheter 16 Fr.) 385 405 790 47 -- 47    Stool  --  -- --  --  -- --    Number of Times Stooled -- 2 x 2 x -- -- --    Total Output 385 405 790 47 -- 47       Net I/O     1465 94.8 1559.8 -47 -- -47            Intake/Output Summary (Last 24 hours)  at 1/24/2021 0854  Last data filed at 1/24/2021 0800  Gross per 24 hour   Intake 1049.81 ml   Output 797 ml   Net 252.81 ml            • LORazepam  0.5 mg Q15 MIN PRN    Or   • LORazepam  2 mg Q15 MIN PRN    Or   • LORazepam  2 mg Q15 MIN PRN    Or   • LORazepam  1 mg Q15 MIN PRN   • potassium chloride SA  20 mEq BID   • Respiratory Therapy Consult   Continuous RT   • Pharmacy Consult Request  1 Each PHARMACY TO DOSE   • docusate sodium  100 mg BID   • senna-docusate  1 Tab Nightly   • senna-docusate  1 Tab Q24HRS PRN   • polyethylene glycol/lytes  1 Packet BID   • magnesium hydroxide  30 mL DAILY   • bisacodyl  10 mg Q24HRS PRN   • fleet  1 Each Once PRN   • acetaminophen  650 mg Q6HRS   • oxyCODONE immediate-release  5 mg Q3HRS PRN   • oxyCODONE immediate release  10 mg Q3HRS PRN   • fentaNYL  50 mcg Q3HRS PRN   • levETIRAcetam  500 mg BID   • ondansetron  4 mg Q4HRS PRN   • thiamine  100 mg DAILY    And   • multivitamin  1 Tab DAILY    And   • folic acid  1 mg DAILY   • labetalol  10 mg Q HOUR PRN    Or   • labetalol  200 mg Q6HRS PRN   • dexamethasone  2 mg Q12HRS   • lisinopril  5 mg DAILY   • levothyroxine  100 mcg QAM   • lidocaine  2 Patch Q24HR       Assessment and Plan:  Hospital day #3 contusion/SDH  POD #NA  Prophylactic anticoagulation: no         Start date/time: tbd  Continue observation  SDH with minimal mass effect  Correct hyponatremia, keep Na 135-145/135 this am   MRI pending   OK for Q4hr neuro checks     30 mins in direct pt care  [place]

## 2021-01-24 NOTE — PROGRESS NOTES
Trauma / Surgical Daily Progress Note    Date of Service  1/24/2021    Chief Complaint  70 y.o. female admitted 1/21/2021 with Subdural hematoma    Interval Events  Neurologically stable, sedated for alcohol withdrawal, but much less  Alcohol withdrawal manifested, improved   Major electrolyte imbalance addressed  Fluid restriction for hyponatremia effective   Repletion Phos completed   Tertiary survey completed, negative   Suspected cirrhosis with high Meld score     The patient remains  injured with severe closed head injury and Acute alcohol withdrawal syndrome, with underlying comorbidities.  The patient was seen and examined on rounds and discussed with the multidisciplinary critical care team and consulting physicians. Critically evaluated laboratory tests, culture data, medications, imaging, and other diagnostic tests.    The patient has impairment of one or more vital organ systems and a high probability of imminent or life-threatening deterioration in condition. Provided high complexity decision making to assess, manipulate, and support vital system functions to treat vital organ system failure and/or to prevent further life-threatening deterioration of the patient's condition. Requires continued hospital admission.    Critical care interventions include: integration of multiple data points and associated complex medical decision making, management of intracranial hypertension and management of critical electrolyte abnormalities.  I have personally evaluated this patient at the bedside and performed a global high level assessment to allow clinical decision making regarding the patient's risk tolerance for transfer to a lower level of care in this in-house risk environment. This decision and takes into account the patient's current active clinical problems and  Comorbidities. This includes:  Complete neurologic assessment  Assessment of respiratory function considering the need her ongoing therapies and the  "patient's tolerance'  Cardiovascular status  Coordination of care needs        Review of Systems  Review of Systems   HENT: Positive for hearing loss.    Neurological: Positive for headaches.   Psychiatric/Behavioral: Positive for agitation and hallucinations.   All other systems reviewed and are negative.       Vital Signs for last 24 hours  Temp:  [36.3 °C (97.4 °F)-37.4 °C (99.3 °F)] 36.3 °C (97.4 °F)  Pulse:  [] 76  Resp:  [13-42] 25  BP: ()/(53-96) 96/57  SpO2:  [96 %-100 %] 100 %    Hemodynamic parameters for last 24 hours       Respiratory Data     Respiration: (!) 25, Pulse Oximetry: 100 %     Work Of Breathing / Effort: Increased Work of Breathing;Tachypnea  RUL Breath Sounds: Diminished, RML Breath Sounds: Diminished, RLL Breath Sounds: Diminished, CAIN Breath Sounds: Diminished, LLL Breath Sounds: Diminished    Physical Exam  Physical Exam  Vitals signs and nursing note reviewed. Exam conducted with a chaperone present.     Vitals: BP (!) 98/57   Pulse 94   Temp 36.7 °C (98 °F) (Temporal)   Resp 16   Ht 1.676 m (5' 6\")   Wt 84 kg (185 lb 3 oz)   SpO2 99%   BMI 29.89 kg/m²   Constitutional:                General Appearance: Mild detox symptomes.  HEENT:               Bruising to head, cheek . The pupils are equal, round, and reactive to light bilaterally. The extraocular muscles are intact bilaterally.. The nares and oropharynx are clear. The midface and jaw are stable. No malocclusion is evident.  Neck:               No posterior midline cervical-spine tenderness, no evidence of intoxication, normal level of alertness ([place] Coma Scale 15), no focal neurologic deficit, and no painful distracting injuries.  Respiratory:              Inspection: Unlabored respirations, no intercostal retractions, paradoxical motion, or accessory muscle use.              Palpation:  The chest is nontender. The clavicles are non deformed bilaterally..              Auscultation: normal, clear to " auscultation.  Cardiovascular:              Auscultation: normal and regular rate and rhythm.              Peripheral Pulses: Normal.   Abdomen:              Abdomen is soft, nontender, without organomegaly or masses.  Genitourinary:              (FEMALE): normal female external genitalia without lesions.  Musculoskeletal:              The pelvis is stable. Numerous areas of various stages of bruising noted on extremeties   Back:              The thoracolumbar spine was examined. Examination is remarkable for no significant tenderness, swelling, or deformity in the thoracolumbar region.  Skin:              The skin is warm and dry.  Neurologic:               [place] Coma Scale (GCS) 15 [id number]. Neurologic examination revealed oriented for age x3. tremulous  Psychiatric:              The patient is mildly anxious  .    Laboratory  Recent Results (from the past 24 hour(s))   CBC with Differential: Tomorrow AM    Collection Time: 01/24/21  3:35 AM   Result Value Ref Range    WBC 4.8 4.8 - 10.8 K/uL    RBC 2.68 (L) 4.20 - 5.40 M/uL    Hemoglobin 9.6 (L) 12.0 - 16.0 g/dL    Hematocrit 28.7 (L) 37.0 - 47.0 %    .1 (H) 81.4 - 97.8 fL    MCH 35.8 (H) 27.0 - 33.0 pg    MCHC 33.4 (L) 33.6 - 35.0 g/dL    RDW 61.7 (H) 35.9 - 50.0 fL    Platelet Count 137 (L) 164 - 446 K/uL    MPV 10.4 9.0 - 12.9 fL    Neutrophils-Polys 68.70 44.00 - 72.00 %    Lymphocytes 17.90 (L) 22.00 - 41.00 %    Monocytes 9.90 0.00 - 13.40 %    Eosinophils 2.10 0.00 - 6.90 %    Basophils 0.80 0.00 - 1.80 %    Immature Granulocytes 0.60 0.00 - 0.90 %    Nucleated RBC 0.00 /100 WBC    Neutrophils (Absolute) 3.27 2.00 - 7.15 K/uL    Lymphs (Absolute) 0.85 (L) 1.00 - 4.80 K/uL    Monos (Absolute) 0.47 0.00 - 0.85 K/uL    Eos (Absolute) 0.10 0.00 - 0.51 K/uL    Baso (Absolute) 0.04 0.00 - 0.12 K/uL    Immature Granulocytes (abs) 0.03 0.00 - 0.11 K/uL    NRBC (Absolute) 0.00 K/uL   Comp Metabolic Panel (CMP): Tomorrow AM    Collection Time: 01/24/21  3:35  AM   Result Value Ref Range    Sodium 135 135 - 145 mmol/L    Potassium 4.8 3.6 - 5.5 mmol/L    Chloride 104 96 - 112 mmol/L    Co2 21 20 - 33 mmol/L    Anion Gap 10.0 7.0 - 16.0    Glucose 113 (H) 65 - 99 mg/dL    Bun 4 (L) 8 - 22 mg/dL    Creatinine 0.61 0.50 - 1.40 mg/dL    Calcium 8.1 (L) 8.5 - 10.5 mg/dL    AST(SGOT) 183 (H) 12 - 45 U/L    ALT(SGPT) 88 (H) 2 - 50 U/L    Alkaline Phosphatase 226 (H) 30 - 99 U/L    Total Bilirubin 2.1 (H) 0.1 - 1.5 mg/dL    Albumin 3.0 (L) 3.2 - 4.9 g/dL    Total Protein 5.9 (L) 6.0 - 8.2 g/dL    Globulin 2.9 1.9 - 3.5 g/dL    A-G Ratio 1.0 g/dL   ESTIMATED GFR    Collection Time: 01/24/21  3:35 AM   Result Value Ref Range    GFR If African American >60 >60 mL/min/1.73 m 2    GFR If Non African American >60 >60 mL/min/1.73 m 2       Fluids    Intake/Output Summary (Last 24 hours) at 1/24/2021 1407  Last data filed at 1/24/2021 0800  Gross per 24 hour   Intake 499.81 ml   Output 552 ml   Net -52.19 ml       Core Measures & Quality Metrics  Core Measures & Quality Metrics    ADDY Score    ETOH Screening      Assessment/Plan  Traumatic subdural hematoma (HCC)- (present on admission)  Assessment & Plan  4mm right lateral subdural hematoma without mass-effect.  Repeat interval CT imaging of the brain stable.  MR imaging of the brain to better characterize when withdrawal symptoms permit.  Non-operative management.  Post traumatic pharmacologic seizure prophylaxis for 1 week.  Speech Language Pathology cognitive evaluation.  Maikol Trimble MD. Neurosurgeon. [facility].    Hypophosphatemia- (present on admission)  Assessment & Plan  Replace with 30 mmol KPhos and trend.    Hypokalemia- (present on admission)  Assessment & Plan  Profound hypokalemia.  Empiric magnesium replacement.  Replete with oral supplementation and trend.    Screening examination for infectious disease- (present on admission)  Assessment & Plan  Admission SARS-CoV-2 testing negative. Repeat  SARS-CoV-2 testing not indicated. Isolation precautions de-escalated.    Contraindication to deep vein thrombosis (DVT) prophylaxis- (present on admission)  Assessment & Plan  Systemic anticoagulation contraindicated secondary to elevated bleeding risk.  RAP score 6.  1/23 Trauma screening duplex ordered.    History of alcohol abuse- (present on admission)  Assessment & Plan  History of alcohol abuse with withdrawal symptoms noted in Emergency Department.  Alcohol withdrawal protocol initiated on admission.    Hyponatremia- (present on admission)  Assessment & Plan  Asymptomatic, likely longstanding hyponatremia.  Correct slowly with normal saline and trend.    Essential hypertension- (present on admission)  Assessment & Plan  Chronic condition treated with lisinopril.  Resumed maintenance medication on admission.    Hypothyroid- (present on admission)  Assessment & Plan  Chronic condition treated with levothyroxine.  Resumed maintenance medication on admission.    Trauma- (present on admission)  Assessment & Plan  GLF.  Non Trauma Activation. Transfer from [facility] with head bleed.  Grayson López MD. Trauma Surgery.      Discussed patient condition with RN, RT,  and Patient.  CRITICAL CARE TIME EXCLUDING PROCEDURES: 41    minutes

## 2021-01-24 NOTE — ASSESSMENT & PLAN NOTE
GLF.  Non Trauma Activation. Transfer from [facility] with head bleed.  Grayson López MD. Trauma Surgery.

## 2021-01-24 NOTE — PROGRESS NOTES
Trauma / Surgical Daily Progress Note    Date of Service  1/24/2021    Chief Complaint  70 y.o. female admitted 1/21/2021 with a subdural hematoma after a GLF while intoxicated    Interval Events  Sleepy, awakens easily, appropriate but delayed responses    - MR brain pending  - Duplex pending  - PT/OT  - SLP for CE  - Trail dean removal  - Labs in AM  - Transfer to Neurosciences or Ortho/Spine    Review of Systems  Review of Systems   Constitutional: Positive for malaise/fatigue. Negative for chills and fever.   HENT: Negative for hearing loss.    Eyes: Negative for blurred vision and double vision.   Respiratory: Negative for shortness of breath.    Cardiovascular: Negative for chest pain.   Gastrointestinal: Negative for abdominal pain, constipation (BM 1/24), nausea and vomiting.   Genitourinary: Negative for dysuria (voiding).   Musculoskeletal: Positive for myalgias. Negative for back pain, joint pain and neck pain.   Skin: Negative for rash.   Neurological: Negative for dizziness, tingling, tremors, sensory change, speech change, focal weakness and headaches.        Vital Signs  Temp:  [36.3 °C (97.4 °F)-37.4 °C (99.3 °F)] 36.3 °C (97.4 °F)  Pulse:  [] 76  Resp:  [13-42] 25  BP: ()/(53-96) 96/57  SpO2:  [96 %-100 %] 100 %    Physical Exam  Physical Exam  Vitals signs and nursing note reviewed.   Constitutional:       General: She is sleeping. She is not in acute distress.     Appearance: She is overweight. She is not ill-appearing.   HENT:      Head: Normocephalic.      Comments: Chin and left cheek ecchymosis     Nose: Nose normal.      Mouth/Throat:      Mouth: Mucous membranes are moist.      Pharynx: Oropharynx is clear.   Eyes:      Extraocular Movements: Extraocular movements intact.      Pupils: Pupils are equal, round, and reactive to light.   Neck:      Musculoskeletal: Neck supple.   Pulmonary:      Effort: Pulmonary effort is normal. No respiratory distress.   Musculoskeletal:       Comments: Moves all extremities   Skin:     General: Skin is warm and dry.      Comments: Scattered areas of ecchymosis at various stages of healing to all extremities   Neurological:      Mental Status: She is easily aroused.      GCS: GCS eye subscore is 4. GCS verbal subscore is 4. GCS motor subscore is 6.   Psychiatric:         Mood and Affect: Affect is flat.         Speech: Speech is delayed.         Laboratory  Recent Results (from the past 24 hour(s))   CBC with Differential: Tomorrow AM    Collection Time: 01/24/21  3:35 AM   Result Value Ref Range    WBC 4.8 4.8 - 10.8 K/uL    RBC 2.68 (L) 4.20 - 5.40 M/uL    Hemoglobin 9.6 (L) 12.0 - 16.0 g/dL    Hematocrit 28.7 (L) 37.0 - 47.0 %    .1 (H) 81.4 - 97.8 fL    MCH 35.8 (H) 27.0 - 33.0 pg    MCHC 33.4 (L) 33.6 - 35.0 g/dL    RDW 61.7 (H) 35.9 - 50.0 fL    Platelet Count 137 (L) 164 - 446 K/uL    MPV 10.4 9.0 - 12.9 fL    Neutrophils-Polys 68.70 44.00 - 72.00 %    Lymphocytes 17.90 (L) 22.00 - 41.00 %    Monocytes 9.90 0.00 - 13.40 %    Eosinophils 2.10 0.00 - 6.90 %    Basophils 0.80 0.00 - 1.80 %    Immature Granulocytes 0.60 0.00 - 0.90 %    Nucleated RBC 0.00 /100 WBC    Neutrophils (Absolute) 3.27 2.00 - 7.15 K/uL    Lymphs (Absolute) 0.85 (L) 1.00 - 4.80 K/uL    Monos (Absolute) 0.47 0.00 - 0.85 K/uL    Eos (Absolute) 0.10 0.00 - 0.51 K/uL    Baso (Absolute) 0.04 0.00 - 0.12 K/uL    Immature Granulocytes (abs) 0.03 0.00 - 0.11 K/uL    NRBC (Absolute) 0.00 K/uL   Comp Metabolic Panel (CMP): Tomorrow AM    Collection Time: 01/24/21  3:35 AM   Result Value Ref Range    Sodium 135 135 - 145 mmol/L    Potassium 4.8 3.6 - 5.5 mmol/L    Chloride 104 96 - 112 mmol/L    Co2 21 20 - 33 mmol/L    Anion Gap 10.0 7.0 - 16.0    Glucose 113 (H) 65 - 99 mg/dL    Bun 4 (L) 8 - 22 mg/dL    Creatinine 0.61 0.50 - 1.40 mg/dL    Calcium 8.1 (L) 8.5 - 10.5 mg/dL    AST(SGOT) 183 (H) 12 - 45 U/L    ALT(SGPT) 88 (H) 2 - 50 U/L    Alkaline Phosphatase 226 (H) 30 - 99 U/L  "   Total Bilirubin 2.1 (H) 0.1 - 1.5 mg/dL    Albumin 3.0 (L) 3.2 - 4.9 g/dL    Total Protein 5.9 (L) 6.0 - 8.2 g/dL    Globulin 2.9 1.9 - 3.5 g/dL    A-G Ratio 1.0 g/dL   ESTIMATED GFR    Collection Time: 01/24/21  3:35 AM   Result Value Ref Range    GFR If African American >60 >60 mL/min/1.73 m 2    GFR If Non African American >60 >60 mL/min/1.73 m 2       Fluids    Intake/Output Summary (Last 24 hours) at 1/24/2021 1337  Last data filed at 1/24/2021 0800  Gross per 24 hour   Intake 499.81 ml   Output 602 ml   Net -102.19 ml       Core Measures & Quality Metrics  Labs reviewed, Medications reviewed and Radiology images reviewed  Dean Catheter: Trial dean removal.      DVT Prophylaxis: Contraindicated - High bleeding risk  DVT prophylaxis - mechanical: SCDs  Ulcer prophylaxis: Not indicated    Assessed for rehab: Patient returned to prior level of function, rehabilitation not indicated at this time    RAP Score Total: 6    ETOH Screening     Assessment complete date: 1/24/2021 (Admission BA positive, CAGE not completed)  Intervention: yes. Patient response to intervention: \"I didnt drink today\".   Patient does not demonstrate understanding of intervention. Patient does not agree to follow-up.   has been contacted.   t    Assessment/Plan  Traumatic subdural hematoma (HCC)- (present on admission)  Assessment & Plan  4mm right lateral subdural hematoma without mass-effect.  Repeat interval CT imaging of the brain stable.  MR imaging of the brain to better characterize when withdrawal symptoms permit.  Non-operative management.  Post traumatic pharmacologic seizure prophylaxis for 1 week.  Speech Language Pathology cognitive evaluation.  Maikol Trimble MD. Neurosurgeon. [facility].    Hypophosphatemia- (present on admission)  Assessment & Plan  Replace with 30 mmol KPhos and trend.    Hypokalemia- (present on admission)  Assessment & Plan  Profound hypokalemia.  Empiric magnesium " replacement.  Replete with oral supplementation and trend.    Screening examination for infectious disease- (present on admission)  Assessment & Plan  Admission SARS-CoV-2 testing negative. Repeat SARS-CoV-2 testing not indicated. Isolation precautions de-escalated.    Contraindication to deep vein thrombosis (DVT) prophylaxis- (present on admission)  Assessment & Plan  Systemic anticoagulation contraindicated secondary to elevated bleeding risk.  RAP score 6.  1/23 Trauma screening duplex ordered.    History of alcohol abuse- (present on admission)  Assessment & Plan  History of alcohol abuse with withdrawal symptoms noted in Emergency Department.  Alcohol withdrawal protocol initiated on admission.    Hyponatremia- (present on admission)  Assessment & Plan  Asymptomatic, likely longstanding hyponatremia.  Correct slowly with normal saline and trend.    Essential hypertension- (present on admission)  Assessment & Plan  Chronic condition treated with lisinopril.  Resumed maintenance medication on admission.    Hypothyroid- (present on admission)  Assessment & Plan  Chronic condition treated with levothyroxine.  Resumed maintenance medication on admission.    Trauma- (present on admission)  Assessment & Plan  GLF.  Non Trauma Activation. Transfer from [facility] with head bleed.  Grayson López MD. Trauma Surgery.      Discussed patient condition with RN, Patient and trauma surgery. Dr. Roth

## 2021-01-24 NOTE — PROGRESS NOTES
Rounded with Dr. Cool, states able to change patient to Q4 hour neuro checks and ok to transfer to floor from neurosurgery standpoint.  Orders placed for updated neuro checks.

## 2021-01-25 PROBLEM — R73.9 HYPERGLYCEMIA: Status: ACTIVE | Noted: 2021-01-01

## 2021-01-25 PROBLEM — Z02.9 DISCHARGE PLANNING ISSUES: Status: ACTIVE | Noted: 2021-01-01

## 2021-01-25 NOTE — PROGRESS NOTES
Patient's Daughter, Brigida, called for updates regarding patient's status.  During this time Brigida informed me that she went to her mom's house to see what the state of it was.  Per Brigida's report she found 4 empty gallon bottles of Vodka in the trash can and 2 more empty bottles of vodka on the counter in the house.  Brigida also expressed how bad it smelled and that she noted feces throughout the house, but especially on the patient's mattress.  She has major concerns regarding patient returning home.  She states that in prior hospital stays the patient will state she wants to go home with home health but will then cancel home health services after a couple weeks.  Brigida says she does not feel that the patient is capable of caring for her self.

## 2021-01-25 NOTE — PROGRESS NOTES
_____________________________________________________________    2 RN skin check completed with ARNAUD Fallon      Areas of concern/Skin observations:  · Bruising bilateral upper and lower extremities     · Red/blanching/peeling sacrum-mepilex in place, barrier cream wipes used   · Abrasion with scabbing over left knee  · Bruising under chin    Devices in use, assessed under and interventions (as appropriate) for skin protection:  · SCDs, BP cuff, SaO2 monitor  · No redness or skin breakdown around bilateral ears-Silicone nasal cannula with ear padding in use    Interventions in place such as:   · q2 hour turns  · Keeping skin clean and dry  · Use of products such as barrier wipes/cream  · Waffle cushion while OOB: Yes   · Bed Type: low air loss mattress   · Mobility: EOB, Up to chair

## 2021-01-25 NOTE — ASSESSMENT & PLAN NOTE
Date of admission: 1/21/2021  Date: 1/24 Transfer orders from SICU  Date: 1/25 Rehab referral  1/26 SNF referral  Cleared for discharge: No  Discharge delayed: No    Discharge date: tbd.

## 2021-01-25 NOTE — PROGRESS NOTES
Trauma / Surgical Daily Progress Note    Date of Service  1/25/2021    Chief Complaint  70 y.o. female admitted 1/21/2021 with Subdural hematoma    Interval Events  Remains in ICU pending [facility] bed  MRI pending   Patient remains with some tremors from ETOH withdrawal    -Transfer to [facility] bed  -Therapies  -Counseled  -Recheck discharge needs post withdrawal       Review of Systems  Review of Systems   Constitutional: Negative for chills and fever.   HENT: Negative for hearing loss.    Eyes: Negative for blurred vision and double vision.   Respiratory: Negative for shortness of breath.    Cardiovascular: Negative for chest pain.   Gastrointestinal: Negative for abdominal pain, constipation, nausea and vomiting.        Last bowel movement 1/25   Genitourinary: Negative for dysuria (voiding).   Musculoskeletal: Positive for myalgias. Negative for back pain, joint pain and neck pain.   Skin: Negative for rash.   Neurological: Positive for tremors and headaches. Negative for dizziness, tingling, sensory change, speech change and focal weakness.        Vital Signs  Temp:  [36.6 °C (97.8 °F)-37.2 °C (98.9 °F)] 37.1 °C (98.7 °F)  Pulse:  [] 91  Resp:  [18-66] 24  BP: (104-154)/(57-99) 154/99  SpO2:  [91 %-100 %] 94 %    Physical Exam  Physical Exam  Vitals signs and nursing note reviewed.   Constitutional:       General: She is sleeping. She is not in acute distress.     Appearance: She is overweight. She is not ill-appearing.   HENT:      Head: Normocephalic.      Comments: Chin and left cheek ecchymosis     Nose: Nose normal.      Mouth/Throat:      Mouth: Mucous membranes are moist.      Pharynx: Oropharynx is clear.   Eyes:      General: Scleral icterus present.      Extraocular Movements: Extraocular movements intact.      Pupils: Pupils are equal, round, and reactive to light.   Neck:      Musculoskeletal: Neck supple.   Pulmonary:      Effort: Pulmonary effort is normal. No respiratory distress.      Comments:  Various bruised areas on chest wall   Musculoskeletal:         General: Signs of injury present.      Comments: Moves all extremities   Skin:     General: Skin is warm and dry.      Comments: Scattered areas of ecchymosis at various stages of healing to all extremities   Neurological:      Mental Status: She is easily aroused.      GCS: GCS eye subscore is 4. GCS verbal subscore is 4. GCS motor subscore is 6.   Psychiatric:         Mood and Affect: Affect is flat.         Speech: Speech is delayed.         Laboratory  Recent Results (from the past 24 hour(s))   CBC with Differential: Tomorrow AM    Collection Time: 01/25/21  3:34 AM   Result Value Ref Range    WBC 4.8 4.8 - 10.8 K/uL    RBC 2.84 (L) 4.20 - 5.40 M/uL    Hemoglobin 10.3 (L) 12.0 - 16.0 g/dL    Hematocrit 30.3 (L) 37.0 - 47.0 %    .7 (H) 81.4 - 97.8 fL    MCH 36.3 (H) 27.0 - 33.0 pg    MCHC 34.0 33.6 - 35.0 g/dL    RDW 59.7 (H) 35.9 - 50.0 fL    Platelet Count 169 164 - 446 K/uL    MPV 10.2 9.0 - 12.9 fL    Neutrophils-Polys 69.40 44.00 - 72.00 %    Lymphocytes 17.20 (L) 22.00 - 41.00 %    Monocytes 11.20 0.00 - 13.40 %    Eosinophils 1.20 0.00 - 6.90 %    Basophils 0.60 0.00 - 1.80 %    Immature Granulocytes 0.40 0.00 - 0.90 %    Nucleated RBC 0.00 /100 WBC    Neutrophils (Absolute) 3.35 2.00 - 7.15 K/uL    Lymphs (Absolute) 0.83 (L) 1.00 - 4.80 K/uL    Monos (Absolute) 0.54 0.00 - 0.85 K/uL    Eos (Absolute) 0.06 0.00 - 0.51 K/uL    Baso (Absolute) 0.03 0.00 - 0.12 K/uL    Immature Granulocytes (abs) 0.02 0.00 - 0.11 K/uL    NRBC (Absolute) 0.00 K/uL   Comp Metabolic Panel (CMP): Tomorrow AM    Collection Time: 01/25/21  3:34 AM   Result Value Ref Range    Sodium 131 (L) 135 - 145 mmol/L    Potassium 5.2 3.6 - 5.5 mmol/L    Chloride 101 96 - 112 mmol/L    Co2 21 20 - 33 mmol/L    Anion Gap 9.0 7.0 - 16.0    Glucose 109 (H) 65 - 99 mg/dL    Bun 4 (L) 8 - 22 mg/dL    Creatinine 0.53 0.50 - 1.40 mg/dL    Calcium 8.5 8.5 - 10.5 mg/dL    AST(SGOT)  "147 (H) 12 - 45 U/L    ALT(SGPT) 77 (H) 2 - 50 U/L    Alkaline Phosphatase 219 (H) 30 - 99 U/L    Total Bilirubin 1.8 (H) 0.1 - 1.5 mg/dL    Albumin 2.8 (L) 3.2 - 4.9 g/dL    Total Protein 5.8 (L) 6.0 - 8.2 g/dL    Globulin 3.0 1.9 - 3.5 g/dL    A-G Ratio 0.9 g/dL   MAGNESIUM    Collection Time: 01/25/21  3:34 AM   Result Value Ref Range    Magnesium 1.3 (L) 1.5 - 2.5 mg/dL   PHOSPHORUS    Collection Time: 01/25/21  3:34 AM   Result Value Ref Range    Phosphorus 1.9 (L) 2.5 - 4.5 mg/dL   ESTIMATED GFR    Collection Time: 01/25/21  3:34 AM   Result Value Ref Range    GFR If African American >60 >60 mL/min/1.73 m 2    GFR If Non African American >60 >60 mL/min/1.73 m 2       Fluids    Intake/Output Summary (Last 24 hours) at 1/25/2021 0943  Last data filed at 1/25/2021 0600  Gross per 24 hour   Intake 370 ml   Output 400 ml   Net -30 ml       Core Measures & Quality Metrics  Labs reviewed and Medications reviewed  Ross catheter: No Ross        DVT prophylaxis - mechanical: SCDs          RAP Score Total: 6    ETOH Screening     Assessment complete date: 1/24/2021 (Admission BA positive, CAGE not completed)  Intervention: yes. Patient response to intervention: \"I didnt drink today\".   Patient does not demonstrate understanding of intervention. Patient does not agree to follow-up.   has been contacted.       Assessment/Plan  Traumatic subdural hematoma (HCC)- (present on admission)  Assessment & Plan  4mm right lateral subdural hematoma without mass-effect.  Repeat interval CT imaging of the brain stable.  MR imaging of the brain to better characterize when withdrawal symptoms permit.  Non-operative management.  Post traumatic pharmacologic seizure prophylaxis for 1 week.  Speech Language Pathology cognitive evaluation.  Maikol Trimble MD. Neurosurgeon. [facility].    Hypophosphatemia- (present on admission)  Assessment & Plan  Replace with 30 mmol KPhos and trend.    Hypokalemia- " (present on admission)  Assessment & Plan  Profound hypokalemia.  Empiric magnesium replacement.  Potassium replaced  1/25 Normalized, supplementation discontinued.  Trend    Screening examination for infectious disease- (present on admission)  Assessment & Plan  Admission SARS-CoV-2 testing negative. Repeat SARS-CoV-2 testing not indicated. Isolation precautions de-escalated.    Contraindication to deep vein thrombosis (DVT) prophylaxis- (present on admission)  Assessment & Plan  Systemic anticoagulation contraindicated secondary to elevated bleeding risk.  RAP score 6.  1/25 Trauma screening bilateral lower extremity venous duplex negative for above knee DVT.    History of alcohol abuse- (present on admission)  Assessment & Plan  History of alcohol abuse with withdrawal symptoms noted in Emergency Department.  Alcohol withdrawal protocol initiated on admission.    Hyponatremia- (present on admission)  Assessment & Plan  Asymptomatic, likely longstanding hyponatremia.  1/25 1500ml fluid restriction initiated   Trend    Essential hypertension- (present on admission)  Assessment & Plan  Chronic condition treated with lisinopril.  Resumed maintenance medication on admission.    Hypothyroid- (present on admission)  Assessment & Plan  Chronic condition treated with levothyroxine.  Resumed maintenance medication on admission.    Trauma- (present on admission)  Assessment & Plan  GLF.  Non Trauma Activation. Transfer from [facility] with head bleed.  Grayson Lpóez MD. Trauma Surgery.        Discussed patient condition with RN, Patient and trauma surgery. Dr Gambino

## 2021-01-25 NOTE — DISCHARGE PLANNING
Anticipated Discharge Disposition: TBD    Action: Pt discussed in IDT rounds. No case management or discharge needs at this time. Reviewed RN's documentation about daughter's concerns regarding pt's drinking and how she does not feel like patient can care for herself at home. Apparently this is a reoccurring issue.     Case management to discuss community resources with pt and daughter once more appropriate.    Barriers to Discharge: Medical clearance, etoh withdrawal and unknown dc needs - no post acute referrals at this time     Plan: Continue to assist with social/dc needs

## 2021-01-25 NOTE — PROGRESS NOTES
Neurosurgery Progress Note    Subjective:  No events    Exam:    A&O x2  PERRL, EOMI  Face symm, tongue midline  RUVALCABA with FS, no drift      BP  Min: 96/57  Max: 146/93  Pulse  Av.1  Min: 76  Max: 115  Resp  Av.3  Min: 18  Max: 66  Temp  Av.8 °C (98.2 °F)  Min: 36.3 °C (97.4 °F)  Max: 37.2 °C (98.9 °F)  SpO2  Av.6 %  Min: 91 %  Max: 100 %    No data recorded    Recent Labs     21   WBC 4.6* 4.8 4.8   RBC 2.39* 2.68* 2.84*   HEMOGLOBIN 8.7* 9.6* 10.3*   HEMATOCRIT 24.6* 28.7* 30.3*   .9* 107.1* 106.7*   MCH 36.4* 35.8* 36.3*   MCHC 35.4* 33.4* 34.0   RDW 56.8* 61.7* 59.7*   PLATELETCT 123* 137* 169   MPV 10.7 10.4 10.2     Recent Labs     21   SODIUM 128* 135 131*   POTASSIUM 4.2 4.8 5.2   CHLORIDE 98 104 101   CO2    GLUCOSE 117* 113* 109*   BUN 4* 4* 4*   CREATININE 0.53 0.61 0.53   CALCIUM 7.6* 8.1* 8.5               Intake/Output       21 - 21 - 21 0659       Total  Total       Intake    P.O.  --  250 250  --  -- --    P.O. -- 250 250 -- -- --    Other  --  120 120  --  -- --    Medications (PO/Enteral Liquids) -- 120 120 -- -- --    Total Intake -- 370 370 -- -- --       Output    Urine  47  0 47  --  -- --    Urine Void (mL) -- 0 0 -- -- --    Output (mL) (Urethral Catheter 16 Fr.) 47 -- 47 -- -- --    Total Output 47 0 47 -- -- --       Net I/O     -47 370 323 -- -- --            Intake/Output Summary (Last 24 hours) at 2021 0730  Last data filed at 2021 0600  Gross per 24 hour   Intake 370 ml   Output 47 ml   Net 323 ml            • LORazepam  0.5 mg Q15 MIN PRN    Or   • LORazepam  2 mg Q15 MIN PRN    Or   • LORazepam  2 mg Q15 MIN PRN    Or   • LORazepam  1 mg Q15 MIN PRN   • potassium chloride SA  20 mEq BID   • Respiratory Therapy Consult   Continuous RT   • Pharmacy Consult Request  1 Each PHARMACY  TO DOSE   • docusate sodium  100 mg BID   • senna-docusate  1 Tab Nightly   • senna-docusate  1 Tab Q24HRS PRN   • polyethylene glycol/lytes  1 Packet BID   • magnesium hydroxide  30 mL DAILY   • bisacodyl  10 mg Q24HRS PRN   • fleet  1 Each Once PRN   • acetaminophen  650 mg Q6HRS   • levETIRAcetam  500 mg BID   • ondansetron  4 mg Q4HRS PRN   • labetalol  10 mg Q HOUR PRN    Or   • labetalol  200 mg Q6HRS PRN   • dexamethasone  2 mg Q12HRS   • lisinopril  5 mg DAILY   • levothyroxine  100 mcg QAM   • lidocaine  2 Patch Q24HR       Assessment and Plan:  Hospital day #4 contusion/SDH  Prophylactic anticoagulation: no         Start date/time: tbd  Continue observation  Correct hyponatremia, keep Na above 130  MRI pending   OK for Q4hr neuro checks  Ok for floor from ns perspective  keppra x7d

## 2021-01-26 PROBLEM — M25.562 ACUTE PAIN OF BOTH KNEES: Status: ACTIVE | Noted: 2021-01-01

## 2021-01-26 PROBLEM — M25.561 ACUTE PAIN OF BOTH KNEES: Status: ACTIVE | Noted: 2021-01-01

## 2021-01-26 NOTE — CONSULTS
Physical Medicine and Rehabilitation Consultation         Initial Consult      Initial Consultation Date: 1/26/2021  Consulting provider: Therese OCHOA  Reason for consultation: assess for acute inpatient rehab appropriateness  LOS: 5 Day(s)      Chief complaint: SDH      HPI:   The patient is a 70 y.o. female with a past medical history of alcohol abuse;  who presented on 1/21/2021  2:46 PM as transfer from OSH after multiple falls over several days, including TBI. Found to have 4mm right lateral subdural hematoma at OSH, transferred to [facility]. Managed non operatively. 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     Patient is a bit confused, not oriented to year, month, date, day, city, name of place; was able to correctly state that she injured her head. The patient currently reports weakness in her arms more than her legs. States has neighbors that may be able to help, but they're old and not able to assist physically. Denies fever, chills, nausea, vomiting, SOB.       ROS:  Pertinent positives are listed in HPI, all other systems reviewed and are negative            Social Hx:  Pre-morbidly, this patient lived in:   1 story home  With 4 steps to enter  Lives independently   Daughter is not able to take her home per chart review  Limited discharge support      Current level of function:   PT, 1/26: Min A FWW x12 ft x2; Superv for bed mobility; Min A for transfers  OT, 1/22: Total A for LBD/toileting; UATP bed/wc transfer        PMH:  Past Medical History:   Diagnosis Date   • Thyroid disease          PSH:  Past Surgical History:   Procedure Laterality Date   • HUSSAIN BY LAPAROSCOPY     • HYSTERECTOMY LAPAROSCOPY           FHX: Reviewed.  No family history on file.              Medications:  Current Facility-Administered Medications   Medication Dose   • LORazepam (ATIVAN) tablet 0.5 mg  0.5 mg   • LORazepam (ATIVAN) tablet 1 mg  1 mg    Or   • LORazepam  "(ATIVAN) injection 0.5 mg  0.5 mg   • LORazepam (ATIVAN) tablet 2 mg  2 mg    Or   • LORazepam (ATIVAN) injection 1 mg  1 mg   • LORazepam (ATIVAN) tablet 3 mg  3 mg    Or   • LORazepam (ATIVAN) injection 1.5 mg  1.5 mg   • LORazepam (ATIVAN) tablet 4 mg  4 mg    Or   • LORazepam (ATIVAN) injection 2 mg  2 mg   • thiamine (Vitamin B-1) tablet 100 mg  100 mg    And   • multivitamin (THERAGRAN) tablet 1 Tab  1 Tab    And   • folic acid (FOLVITE) tablet 1 mg  1 mg   • sodium chloride (SALT) tablet 1 g  1 g   • fludrocortisone (FLORINEF) tablet 0.1 mg  0.1 mg   • Respiratory Therapy Consult     • Pharmacy Consult Request ...Pain Management Review 1 Each  1 Each   • docusate sodium (COLACE) capsule 100 mg  100 mg   • senna-docusate (PERICOLACE or SENOKOT S) 8.6-50 MG per tablet 1 Tab  1 Tab   • senna-docusate (PERICOLACE or SENOKOT S) 8.6-50 MG per tablet 1 Tab  1 Tab   • polyethylene glycol/lytes (MIRALAX) PACKET 1 Packet  1 Packet   • magnesium hydroxide (MILK OF MAGNESIA) suspension 30 mL  30 mL   • bisacodyl (DULCOLAX) suppository 10 mg  10 mg   • fleet enema 133 mL  1 Each   • acetaminophen (Tylenol) tablet 650 mg  650 mg   • levETIRAcetam (KEPPRA) tablet 500 mg  500 mg   • ondansetron (ZOFRAN) syringe/vial injection 4 mg  4 mg   • dexamethasone (DECADRON) tablet 2 mg  2 mg   • lisinopril (PRINIVIL) tablet 5 mg  5 mg   • levothyroxine (SYNTHROID) tablet 100 mcg  100 mcg   • lidocaine (LIDODERM) 5 % 2 Patch  2 Patch       Allergies:  Allergies   Allergen Reactions   • Codeine Unspecified     \"like a heartattack\"   • Morphine Unspecified     \"like a heartattack\"         Vitals: /90   Pulse 91   Temp 37.1 °C (98.7 °F) (Temporal)   Resp 16   Ht 1.676 m (5' 6\")   Wt 85.7 kg (188 lb 15 oz)   SpO2 94%         Physical Exam:  Gen: pleasant  Head: no visible head lesions or abrasion   Eyes/ Nose/ Mouth: moist mucous membranes  Cardio: Well perfused extremities  Pulm: on room air, with normal respiratory " "effort  Abd: Soft NTND  Skin: warm/dry skin; well healed bilateral anterior knee surgical scars  Ext: No atrophy of muscles, no joint contractures of extremities   Psych: confused, not oriented to time (\"2020\", can't recall month, date, day, city, name of place)    Neuro:   -Speech: minimal dysarthria  -Hearing and visual acuity functional and grossly intact  -Face symmetric    Motor:  -Right upper extremity: 4/5 with arm abduction, elbow flexion, elbow extension  -Left upper extremity: 4/5 with arm abduction, elbow flexion, elbow extension  -Right lower extremity: 5/5 with hip flexion, knee extension, ankle dorsiflexion, plantarflexion  -Left lower extremity: 5/5 with hip flexion, knee extension, ankle dorsiflexion, plantarflexion          Labs: Reviewed and significant for 1/26: Hgb 10, Na 128, K 4.4, Cr 0.56  Recent Labs     01/24/21  0335 01/25/21  0334 01/26/21  0542   RBC 2.68* 2.84* 2.80*   HEMOGLOBIN 9.6* 10.3* 10.0*   HEMATOCRIT 28.7* 30.3* 28.7*   PLATELETCT 137* 169 245     Recent Labs     01/24/21  0335 01/25/21  0334 01/26/21  0542   SODIUM 135 131* 128*   POTASSIUM 4.8 5.2 4.4   CHLORIDE 104 101 96   CO2 21 21 22   GLUCOSE 113* 109* 109*   BUN 4* 4* 5*   CREATININE 0.61 0.53 0.56   CALCIUM 8.1* 8.5 8.3*     Recent Results (from the past 24 hour(s))   CBC with Differential: Tomorrow AM    Collection Time: 01/26/21  5:42 AM   Result Value Ref Range    WBC 5.9 4.8 - 10.8 K/uL    RBC 2.80 (L) 4.20 - 5.40 M/uL    Hemoglobin 10.0 (L) 12.0 - 16.0 g/dL    Hematocrit 28.7 (L) 37.0 - 47.0 %    .5 (H) 81.4 - 97.8 fL    MCH 35.7 (H) 27.0 - 33.0 pg    MCHC 34.8 33.6 - 35.0 g/dL    RDW 60.9 (H) 35.9 - 50.0 fL    Platelet Count 245 164 - 446 K/uL    MPV 10.7 9.0 - 12.9 fL    Neutrophils-Polys 71.70 44.00 - 72.00 %    Lymphocytes 13.00 (L) 22.00 - 41.00 %    Monocytes 13.50 (H) 0.00 - 13.40 %    Eosinophils 0.50 0.00 - 6.90 %    Basophils 0.30 0.00 - 1.80 %    Immature Granulocytes 1.00 (H) 0.00 - 0.90 %    " Nucleated RBC 0.00 /100 WBC    Neutrophils (Absolute) 4.26 2.00 - 7.15 K/uL    Lymphs (Absolute) 0.77 (L) 1.00 - 4.80 K/uL    Monos (Absolute) 0.80 0.00 - 0.85 K/uL    Eos (Absolute) 0.03 0.00 - 0.51 K/uL    Baso (Absolute) 0.02 0.00 - 0.12 K/uL    Immature Granulocytes (abs) 0.06 0.00 - 0.11 K/uL    NRBC (Absolute) 0.00 K/uL   Comp Metabolic Panel (CMP): Tomorrow AM    Collection Time: 01/26/21  5:42 AM   Result Value Ref Range    Sodium 128 (L) 135 - 145 mmol/L    Potassium 4.4 3.6 - 5.5 mmol/L    Chloride 96 96 - 112 mmol/L    Co2 22 20 - 33 mmol/L    Anion Gap 10.0 7.0 - 16.0    Glucose 109 (H) 65 - 99 mg/dL    Bun 5 (L) 8 - 22 mg/dL    Creatinine 0.56 0.50 - 1.40 mg/dL    Calcium 8.3 (L) 8.5 - 10.5 mg/dL    AST(SGOT) 122 (H) 12 - 45 U/L    ALT(SGPT) 67 (H) 2 - 50 U/L    Alkaline Phosphatase 216 (H) 30 - 99 U/L    Total Bilirubin 1.7 (H) 0.1 - 1.5 mg/dL    Albumin 2.8 (L) 3.2 - 4.9 g/dL    Total Protein 5.7 (L) 6.0 - 8.2 g/dL    Globulin 2.9 1.9 - 3.5 g/dL    A-G Ratio 1.0 g/dL   HEMOGLOBIN A1C    Collection Time: 01/26/21  5:42 AM   Result Value Ref Range    Glycohemoglobin 5.1 0.0 - 5.6 %    Est Avg Glucose 100 mg/dL   MAGNESIUM    Collection Time: 01/26/21  5:42 AM   Result Value Ref Range    Magnesium 1.5 1.5 - 2.5 mg/dL   PHOSPHORUS    Collection Time: 01/26/21  5:42 AM   Result Value Ref Range    Phosphorus 3.6 2.5 - 4.5 mg/dL   ESTIMATED GFR    Collection Time: 01/26/21  5:42 AM   Result Value Ref Range    GFR If African American >60 >60 mL/min/1.73 m 2    GFR If Non African American >60 >60 mL/min/1.73 m 2           Imaging:  Ct-head W/o  Result Date: 1/21/2021 1/21/2021 4:10 PM HISTORY/REASON FOR EXAM:  Head trauma, mod-severe. TECHNIQUE/EXAM DESCRIPTION AND NUMBER OF VIEWS: CT of the head without contrast. The study was performed on a helical multidetector CT scanner. Contiguous 2.5 mm axial sections were obtained from the skull base through the vertex. Up to date radiation dose reduction adjustments  have been utilized to meet ALARA standards for radiation dose reduction. COMPARISON:  Outside CT head 1/21/2021 12:18 PM, CT head 7/14/2019 FINDINGS: Lateral ventricles are moderately enlarged but symmetric Cortical sulci are enlarged. No significant mass effect or midline shift. Basal cisterns are patent. RIGHT hemispheric subdural hematoma again seen measuring up to 7 mm in thickness, stable. Hyperdense mass again present in the RIGHT occipital lobe adjacent the occipital horn of lateral ventricle. Calvaria are intact. Visualized orbits are unremarkable. Visualized mastoid air cells are clear. Visualized paranasal sinuses are unremarkable.     1.  Stable RIGHT hemispheric subdural hematoma. 2.  Hyperdense RIGHT occipital mass again seen. 3.  Diffuse atrophy. 4.  No significant change from prior exam.      Dx-knee 2- Right  Result Date: 1/26/2021 1/26/2021 1:25 PM HISTORY/REASON FOR EXAM:  Bilateral knee pain after ground-level fall TECHNIQUE/EXAM DESCRIPTION AND NUMBER OF VIEWS:  2 views of the RIGHT knee. COMPARISON: None FINDINGS: BONE MINERALIZATION: Normal. JOINTS: Right knee arthroplasty is well seated. FRACTURE: None. DISLOCATION: None. SOFT TISSUES: No mass. Atherosclerosis.     Well seated right knee arthroplasty. No fracture or dislocation.      Dx-knee 2- Left  Result Date: 1/26/2021 1/26/2021 1:25 PM HISTORY/REASON FOR EXAM:  Pain/Deformity Following Trauma TECHNIQUE/EXAM DESCRIPTION AND NUMBER OF VIEWS:  2 views of the LEFT knee. COMPARISON: None FINDINGS: There is a left knee arthroplasty. No fracture or dislocation is seen. No joint effusion is identified.     Postsurgical changes status post left knee arthroplasty.      Mr-brain-with & W/o  Result Date: 1/25/2021 1/25/2021 1:17 PM HISTORY/REASON FOR EXAM:  Eval right occipital bleed vs mass. TECHNIQUE/EXAM DESCRIPTION: MRI of the brain without and with contrast, with diffusion. The study was performed on a [employer] 1.5 Zeinab MRI scanner.  Spoiled-GRASS sagittal, thin-section T2 axial, T1 coronal, T1 postcontrast coronal, T1 postcontrast axial, FLAIR coronal and diffusion-weighted axial images were obtained of the whole brain. 20 mL ProHance contrast were administered intravenously. COMPARISON:  None. FINDINGS: There is a pattern of moderate cerebral atrophy manifest as prominence of sulcal markings over the convexities and vertex along with moderate ventriculomegaly.  The bony calvaria are intact. There is a 5 mm holohemispheric right-sided subacute subdural hematoma. There is no significant mass effect or midline shift. There is a 13 mm centrally slightly T2 hyperintense lesion in the left parieto-occipital periventricular white matter. There is mixed T1 signal hyperintensity within the lesion consistent with blood products and dense surrounding hemosiderin rim. Findings are consistent with a cavernoma. There is a pattern of mild supratentorial white matter disease with scattered foci of bright T2 and FLAIR signal in the subcortical and deep white matter of both hemispheres consistent with small vessel ischemic change versus demyelination or gliosis. There is no evidence of abnormal diffusion-weighted signal intensity in the brain. There is no evidence of abnormal enhancement in the brain parenchyma. There are normal flow voids in the cavernous carotid arteries and M1 segments. There are normal flow voids in the distal vertebral basilar system. There are normal flow voids in the dural venous sinuses. The visualized paranasal sinuses and mastoid air cells appear clear.     1.  5 mm holohemispheric right sided subacute subdural hematoma. 2.  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 3.  Moderate diffuse cerebral substance loss. 4.  Mild microangiopathic ischemic change versus demyelination or gliosis.            ASSESSMENT:  Patient is a 70 y.o. female admitted with GLF resulting in SDH, managed non operatively.     Pleasantly confused and not oriented  as of 1/26.     #Rehab:   -Vitals: stable, RA  -Insurance: Medicare/Aetna  -Discharge support: lives independently   -Medical clearance:   -Medical complexity: SDH and associated comorbidities; anemia, hyponatremia requiring florinef/salt tabs  -Rehab Impairment Code: 0002.22 - Brain Dysfunction: Traumatic, Closed Injury  -Pending updated OT eval, last one 4d ago  -Pending cog eval with SLP  -With continued confusion, will need discharge support to go home. Given limited discharge support, recommend SNF when medically cleared.       #Neuro: 4mm right lateral subdural hematoma, managed non operatively. 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  -decadron  -Keppra x7 days    #Ortho: hx of bilateral TKR; bilateral knee pain, XR on 1/26 without acute findings    #CV: HTN, Lisinopril     #Pain: Tylenol, Lidoderm,     #Hyponatremia: Na 128 on 1/26 <= 131; goal >130 per neurosurgery  -florinef  -salt tabs 1g TID    #Hypophosphatemia: NaPhos replacement 1/25    #Hyperglycemia: of note, on decadron; SSI     #Alcohol abuse: CIWA     #Supp: thiamine, MVI, folate     #Hypothyroidism: synthroid    #Anemia: Hgb 10 on 1/26, monitor     #Bowel: 1x on 1/26; 4x on 1/25, Bowel meds on hold    #Bladder: voiding    #DVT PPX: SCDs          Discussed with pt, summarized hospitalization and care, options for next step of care  Labs reviewed as above.  Discussed with rehab team about recommendations         Thank you for allowing us to participate in the care of this patient.             Rhys Benavides MD  Physical Medicine and Rehabilitation   1/26/2021

## 2021-01-26 NOTE — DISCHARGE PLANNING
Current documentation reveals a potential for acute inpatient rehabilitation, pending updated [place] evals and D/C resources/support.  Please consider a PMR referral to assist with discharge planning. Msg placed to JAMES Gonzalez.

## 2021-01-26 NOTE — CARE PLAN
Plan of care with patient and patient verbalized understanding but has noted STM deficits.  Pt flat affect but does engage and is cooperative with care and staff.  Incontinent of urine, care rendered, inner thighs red but intact, barrier cream applied, Pt refused stool softener; C/O pain in shin of left lower leg; Emotional support offered.      Problem: Knowledge Deficit  Goal: Knowledge of disease process/condition, treatment plan, diagnostic tests, and medications will improve  Outcome: PROGRESSING SLOWER THAN EXPECTED     Problem: Pain Management  Goal: Pain level will decrease to patient's comfort goal  Outcome: PROGRESSING SLOWER THAN EXPECTED     Problem: Psychosocial Needs:  Goal: Level of anxiety will decrease  Outcome: PROGRESSING SLOWER THAN EXPECTED     Problem: Mobility  Goal: Risk for activity intolerance will decrease  Outcome: PROGRESSING SLOWER THAN EXPECTED     Problem: Skin Integrity  Goal: Risk for impaired skin integrity will decrease  Outcome: PROGRESSING AS EXPECTED

## 2021-01-26 NOTE — PROGRESS NOTES
Patient shift assessment as written in flowsheet.  Pt care rendered as per plan of care with education and reenforcement of same.

## 2021-01-26 NOTE — PROGRESS NOTES
Received report from previous shift RN and assumed care of patient.  Pt is resting comfortably without complaint at this time.   Call bell in reach.  Ongoing care will be provided per orders and plan of care.

## 2021-01-26 NOTE — CARE PLAN
Problem: Communication  Goal: The ability to communicate needs accurately and effectively will improve  1/26/2021 1133 by Migdalia Su R.N.  Outcome: PROGRESSING AS EXPECTED  1/26/2021 0937 by Migdalia Su R.N.  Outcome: PROGRESSING AS EXPECTED     Problem: Safety  Goal: Will remain free from injury  1/26/2021 1133 by Migdalia Su R.N.  Outcome: PROGRESSING AS EXPECTED  1/26/2021 0937 by Migdalia Su R.N.  Outcome: PROGRESSING AS EXPECTED     Problem: Infection  Goal: Will remain free from infection  Outcome: PROGRESSING AS EXPECTED     Problem: Knowledge Deficit  Goal: Knowledge of disease process/condition, treatment plan, diagnostic tests, and medications will improve  1/26/2021 1133 by Migdalia Su R.N.  Outcome: PROGRESSING AS EXPECTED  1/26/2021 0937 by Migdalia Su R.N.  Outcome: PROGRESSING AS EXPECTED     Problem: Pain Management  Goal: Pain level will decrease to patient's comfort goal  Outcome: PROGRESSING AS EXPECTED     Problem: Mobility  Goal: Risk for activity intolerance will decrease  1/26/2021 1133 by Migdalia Su R.N.  Outcome: PROGRESSING AS EXPECTED  1/26/2021 0937 by Migdalia Su R.N.  Outcome: PROGRESSING AS EXPECTED

## 2021-01-26 NOTE — PROGRESS NOTES
Neurosurgery Progress Note    Subjective:  No events  C/o intermittent ha, none now    Exam:    A&O x2-3  PERRL, EOMI  Face symm, tongue midline  RUVALCABA with FS, no drift      BP  Min: 120/91  Max: 156/97  Pulse  Av.6  Min: 88  Max: 96  Resp  Av.9  Min: 16  Max: 95  Temp  Av.7 °C (98.1 °F)  Min: 36.1 °C (97 °F)  Max: 37.2 °C (99 °F)  SpO2  Av.7 %  Min: 92 %  Max: 95 %    No data recorded    Recent Labs     21  03321  0542   WBC 4.8 4.8 5.9   RBC 2.68* 2.84* 2.80*   HEMOGLOBIN 9.6* 10.3* 10.0*   HEMATOCRIT 28.7* 30.3* 28.7*   .1* 106.7* 102.5*   MCH 35.8* 36.3* 35.7*   MCHC 33.4* 34.0 34.8   RDW 61.7* 59.7* 60.9*   PLATELETCT 137* 169 245   MPV 10.4 10.2 10.7     Recent Labs     21  03321  0542   SODIUM 135 131* 128*   POTASSIUM 4.8 5.2 4.4   CHLORIDE 104 101 96   CO2    GLUCOSE 113* 109* 109*   BUN 4* 4* 5*   CREATININE 0.61 0.53 0.56   CALCIUM 8.1* 8.5 8.3*               Intake/Output       21 - 21 0659 21 - 21 0659       Total  Total       Intake    P.O.  240  60 300  100  -- 100    P.O. 240 60 300 100 -- 100    Total Intake 240 60 300 100 -- 100       Output    Urine  --  -- --  --  -- --    Number of Times Voided 2 x 3 x 5 x -- -- --    Stool  --  -- --  --  -- --    Number of Times Stooled 3 x 1 x 4 x 1 x -- 1 x    Total Output -- -- -- -- -- --       Net I/O     240 60 300 100 -- 100            Intake/Output Summary (Last 24 hours) at 2021 1226  Last data filed at 2021 1000  Gross per 24 hour   Intake 160 ml   Output --   Net 160 ml            • LORazepam  0.5 mg Q4HRS PRN   • LORazepam  1 mg Q4HRS PRN    Or   • LORazepam  0.5 mg Q4HRS PRN   • LORazepam  2 mg Q2HRS PRN    Or   • LORazepam  1 mg Q2HRS PRN   • LORazepam  3 mg Q HOUR PRN    Or   • LORazepam  1.5 mg Q HOUR PRN   • LORazepam  4 mg Q15 MIN PRN    Or   • LORazepam  2 mg Q15 MIN PRN    • thiamine  100 mg DAILY    And   • multivitamin  1 Tab DAILY    And   • folic acid  1 mg DAILY   • sodium chloride  1 g TID WITH MEALS   • fludrocortisone  0.1 mg QAM   • Respiratory Therapy Consult   Continuous RT   • Pharmacy Consult Request  1 Each PHARMACY TO DOSE   • docusate sodium  100 mg BID   • senna-docusate  1 Tab Nightly   • senna-docusate  1 Tab Q24HRS PRN   • polyethylene glycol/lytes  1 Packet BID   • magnesium hydroxide  30 mL DAILY   • bisacodyl  10 mg Q24HRS PRN   • fleet  1 Each Once PRN   • acetaminophen  650 mg Q6HRS   • levETIRAcetam  500 mg BID   • ondansetron  4 mg Q4HRS PRN   • dexamethasone  2 mg Q12HRS   • lisinopril  5 mg DAILY   • levothyroxine  100 mcg QAM   • lidocaine  2 Patch Q24HR       Assessment and Plan:  Hospital day #5 contusion/SDH  Prophylactic anticoagulation: no         Start date/time: tbd  Continue observation- dec 2 mg bid  Correct hyponatremia, keep Na above 130- on florinef, salt  MRI brain completed 1/25 - rt sa sdh, rt p/o cavernoma.   OK for Q4hr neuro checks  keppra x7d  Pt/Ot   Placement pending.

## 2021-01-26 NOTE — THERAPY
Physical Therapy   Daily Treatment     Patient Name: Jennifer Koroma  Age:  70 y.o., Sex:  female  Medical Record #: [id number]  Today's Date: 1/26/2021     Precautions: (P) Fall Risk    Assessment    Pt willing to participate w/PT. Pt has improved w/her functional mobility tasks from previous PT session. Pt is now @ supervised level w/bed mobility and min assist for her functional transfers and amb efforts w/the FWW. Pt is ready to start hallway amb. Nrsg please assist pt to the BR w/FWW. Pt pleasant and following commands throughout her efforts.     Plan    Continue current treatment plan.    DC Equipment Recommendations: Unable to determine at this time  Discharge Recommendations: Recommend post-acute placement for additional physical therapy services prior to discharge home     Objective       01/26/21 1224   Balance   Sitting Balance (Static) Fair   Sitting Balance (Dynamic) Fair -   Standing Balance (Static) Fair -   Standing Balance (Dynamic) Fair -   Weight Shift Sitting Fair   Weight Shift Standing Fair   Comments standing w/FWW   Gait Analysis   Gait Level Of Assist Minimal Assist   Assistive Device Front Wheel Walker   Distance (Feet) 12   # of Times Distance was Traveled 2   Deviation Bradykinetic   Skilled Intervention Verbal Cuing;Postural Facilitation   Comments Improvement w/the ability to ambulate from previous PT session. Pt managed to/from the BR today w/FWW, shakey and slow. No posterior lean today. Pt is ready to start amb hallway distances.    Bed Mobility    Supine to Sit Supervised   Sit to Supine Supervised   Scooting Supervised  (seated)   Rolling Modified Independent   Skilled Intervention Verbal Cuing   Comments Improvement w/her bed mobility, pt now @ supervised level from flat surface and no rails.    Functional Mobility   Sit to Stand Minimal Assist  (from EOB->FWW)   Bed, Chair, Wheelchair Transfer Minimal Assist  (w/FWW)   Skilled Intervention Verbal Cuing;Postural  "Facilitation;Compensatory Strategies   Comments Improvement w/pts functional mobility from previous PT session, now @ min assist.    How much difficulty does the patient currently have...   Turning over in bed (including adjusting bedclothes, sheets and blankets)? 4   Sitting down on and standing up from a chair with arms (e.g., wheelchair, bedside commode, etc.) 3   Moving from lying on back to sitting on the side of the bed? 3   How much help from another person does the patient currently need...   Moving to and from a bed to a chair (including a wheelchair)? 3   Need to walk in a hospital room? 3   Climbing 3-5 steps with a railing? 3   6 clicks Mobility Score 19   Short Term Goals    Short Term Goal # 1 Pt will demonstrate bed mobility using railings and Michele for LE's in 6 visits to prepare for transfer tasks.   Goal Outcome # 1 Goal met   Short Term Goal # 2 Pt will demonstrate ability to STS EOB spv w/ FWW for safety in 6 visits to prepare for ambulation.   Goal Outcome # 2 Goal not met   Short Term Goal # 3 Pt will demonstrate ability to ascend/descend 4 stairs (4\") using UE support on railings spv in 6 visits to be able to enter/exit home.   Goal Outcome # 3 Goal not met         "

## 2021-01-26 NOTE — DISCHARGE PLANNING
[facility] Transitional Care Coordination     Referral from:  Therese OCHOA  Facesheet indicates:  Medicare/[employer] Insurance  Potential Rehab Diagnosis:  Traumatic SDH    Chart review indicates patient has on going medical management and therapy needs to possibly meet inpatient rehab facility criteria with the goal of returning to community.    D/C support:  Lives Alone     Physiatry to consult.  Hospital day #5 contusion/SDH; EtOH withdrawal.   Would welcome OT update and SLP COG eval as clinically appropriate.         Last Covid test date: 1/21/2021 - COVID negative      Thank you for the referral.

## 2021-01-26 NOTE — PROGRESS NOTES
Trauma / Surgical Daily Progress Note    Date of Service  1/26/2021    Chief Complaint  70 y.o. female admitted 1/21/2021 with subdural hematoma after a GLF while intoxicated    Interval Events  Transfer from SICU to Neurosciences  Reporting headache and bilateral knee pain  GCS 14, confused  Mildly tremulous  Ongoing therapy needs    - CIWA protocol in place  - Bilateral knee xrays ordered  - Rehab and SNF referrals placed      Review of Systems  Review of Systems   Constitutional: Positive for malaise/fatigue. Negative for chills and fever.   HENT: Negative for hearing loss.    Eyes: Negative for blurred vision and double vision.   Respiratory: Negative for shortness of breath.    Cardiovascular: Negative for chest pain.   Gastrointestinal: Negative for abdominal pain, constipation (BM 1/26), nausea and vomiting.   Genitourinary: Negative for dysuria (voiding).   Musculoskeletal: Positive for joint pain (bilateral knees) and myalgias. Negative for back pain and neck pain.   Skin: Negative for rash.   Neurological: Positive for tremors and headaches. Negative for dizziness, tingling, sensory change, speech change and focal weakness.        Vital Signs  Temp:  [36.1 °C (97 °F)-37.2 °C (99 °F)] 37.1 °C (98.7 °F)  Pulse:  [88-96] 91  Resp:  [16-95] 16  BP: (120-156)/() 140/90  SpO2:  [92 %-95 %] 94 %    Physical Exam  Physical Exam  Vitals signs and nursing note reviewed.   Constitutional:       General: She is awake. She is not in acute distress.     Appearance: She is overweight. She is not ill-appearing.   HENT:      Head: Normocephalic.      Comments: Chin and left cheek ecchymosis     Nose: Nose normal.      Mouth/Throat:      Mouth: Mucous membranes are moist.      Pharynx: Oropharynx is clear.   Eyes:      Extraocular Movements: Extraocular movements intact.      Pupils: Pupils are equal, round, and reactive to light.   Neck:      Musculoskeletal: Neck supple.   Pulmonary:      Effort: Pulmonary effort is  normal. No respiratory distress.   Musculoskeletal:         General: Tenderness present.      Comments: Moves all extremities   Skin:     General: Skin is warm and dry.      Comments: Scattered areas of ecchymosis at various stages of healing to all extremities  Abrasion to left knee over previously healed surgical incision  Ecchymosis to upper chest   Neurological:      GCS: GCS eye subscore is 4. GCS verbal subscore is 4. GCS motor subscore is 6.   Psychiatric:         Mood and Affect: Affect is flat.         Speech: Speech is delayed.         Behavior: Behavior is cooperative.         Laboratory  Recent Results (from the past 24 hour(s))   CBC with Differential: Tomorrow AM    Collection Time: 01/26/21  5:42 AM   Result Value Ref Range    WBC 5.9 4.8 - 10.8 K/uL    RBC 2.80 (L) 4.20 - 5.40 M/uL    Hemoglobin 10.0 (L) 12.0 - 16.0 g/dL    Hematocrit 28.7 (L) 37.0 - 47.0 %    .5 (H) 81.4 - 97.8 fL    MCH 35.7 (H) 27.0 - 33.0 pg    MCHC 34.8 33.6 - 35.0 g/dL    RDW 60.9 (H) 35.9 - 50.0 fL    Platelet Count 245 164 - 446 K/uL    MPV 10.7 9.0 - 12.9 fL    Neutrophils-Polys 71.70 44.00 - 72.00 %    Lymphocytes 13.00 (L) 22.00 - 41.00 %    Monocytes 13.50 (H) 0.00 - 13.40 %    Eosinophils 0.50 0.00 - 6.90 %    Basophils 0.30 0.00 - 1.80 %    Immature Granulocytes 1.00 (H) 0.00 - 0.90 %    Nucleated RBC 0.00 /100 WBC    Neutrophils (Absolute) 4.26 2.00 - 7.15 K/uL    Lymphs (Absolute) 0.77 (L) 1.00 - 4.80 K/uL    Monos (Absolute) 0.80 0.00 - 0.85 K/uL    Eos (Absolute) 0.03 0.00 - 0.51 K/uL    Baso (Absolute) 0.02 0.00 - 0.12 K/uL    Immature Granulocytes (abs) 0.06 0.00 - 0.11 K/uL    NRBC (Absolute) 0.00 K/uL   Comp Metabolic Panel (CMP): Tomorrow AM    Collection Time: 01/26/21  5:42 AM   Result Value Ref Range    Sodium 128 (L) 135 - 145 mmol/L    Potassium 4.4 3.6 - 5.5 mmol/L    Chloride 96 96 - 112 mmol/L    Co2 22 20 - 33 mmol/L    Anion Gap 10.0 7.0 - 16.0    Glucose 109 (H) 65 - 99 mg/dL    Bun 5 (L) 8 -  "22 mg/dL    Creatinine 0.56 0.50 - 1.40 mg/dL    Calcium 8.3 (L) 8.5 - 10.5 mg/dL    AST(SGOT) 122 (H) 12 - 45 U/L    ALT(SGPT) 67 (H) 2 - 50 U/L    Alkaline Phosphatase 216 (H) 30 - 99 U/L    Total Bilirubin 1.7 (H) 0.1 - 1.5 mg/dL    Albumin 2.8 (L) 3.2 - 4.9 g/dL    Total Protein 5.7 (L) 6.0 - 8.2 g/dL    Globulin 2.9 1.9 - 3.5 g/dL    A-G Ratio 1.0 g/dL   HEMOGLOBIN A1C    Collection Time: 01/26/21  5:42 AM   Result Value Ref Range    Glycohemoglobin 5.1 0.0 - 5.6 %    Est Avg Glucose 100 mg/dL   MAGNESIUM    Collection Time: 01/26/21  5:42 AM   Result Value Ref Range    Magnesium 1.5 1.5 - 2.5 mg/dL   PHOSPHORUS    Collection Time: 01/26/21  5:42 AM   Result Value Ref Range    Phosphorus 3.6 2.5 - 4.5 mg/dL   ESTIMATED GFR    Collection Time: 01/26/21  5:42 AM   Result Value Ref Range    GFR If African American >60 >60 mL/min/1.73 m 2    GFR If Non African American >60 >60 mL/min/1.73 m 2       Fluids    Intake/Output Summary (Last 24 hours) at 1/26/2021 1301  Last data filed at 1/26/2021 1000  Gross per 24 hour   Intake 160 ml   Output --   Net 160 ml       Core Measures & Quality Metrics  Labs reviewed, Medications reviewed and Radiology images reviewed  Ross catheter: No Ross      DVT Prophylaxis: Contraindicated - High bleeding risk  DVT prophylaxis - mechanical: SCDs  Ulcer prophylaxis: Not indicated    Assessed for rehab: Patient was assess for and/or received rehabilitation services during this hospitalization    RAP Score Total: 6    ETOH Screening     Assessment complete date: 1/24/2021 (Admission BA positive, CAGE not completed)  Intervention: yes. Patient response to intervention: \"I didnt drink today\".   Patient does not demonstrate understanding of intervention. Patient does not agree to follow-up.   has been contacted.       Assessment/Plan  History of alcohol abuse- (present on admission)  Assessment & Plan  History of alcohol abuse with withdrawal symptoms noted in Emergency " Department.  Alcohol withdrawal protocol initiated on admission.  1/26 CIWA protocol on marques.    Traumatic subdural hematoma (HCC)- (present on admission)  Assessment & Plan  4mm right lateral subdural hematoma without mass-effect.  Repeat interval CT imaging of the brain stable.  1/25 MR imaging of the brain completed, awaiting neurosurgery review.  Non-operative management.  Post traumatic pharmacologic seizure prophylaxis for 1 week.  Speech Language Pathology cognitive evaluation.  Maikol Trimble MD. Neurosurgeon. [facility].    Acute pain of both knees- (present on admission)  Assessment & Plan  1/26 Reporting bilateral knee pain.  Dedicated imaging pending.    Hyperglycemia- (present on admission)  Assessment & Plan  1/21 Decadron initiated.  1/25 Insulin sliding scale. Glycohemoglobin ordered for AM.    Hypophosphatemia- (present on admission)  Assessment & Plan  Replace with 30 mmol KPhos and trend.  1/25 NaPhos replacement.  Trend.    Screening examination for infectious disease- (present on admission)  Assessment & Plan  Admission SARS-CoV-2 testing negative. Repeat SARS-CoV-2 testing not indicated. Isolation precautions de-escalated.    Contraindication to deep vein thrombosis (DVT) prophylaxis- (present on admission)  Assessment & Plan  Systemic anticoagulation contraindicated secondary to elevated bleeding risk.  RAP score 6.  1/25 Trauma screening bilateral lower extremity venous duplex negative for above knee DVT.  Ambulate TID.    Hyponatremia- (present on admission)  Assessment & Plan  Asymptomatic, likely longstanding hyponatremia.  1/25 1500ml fluid restriction initiated. NaPhos replacement.  1/26 Salt tabs and florinef initiated given head bleed.  Goal > 130 per neurosurgery.  Trend.    Discharge planning issues- (present on admission)  Assessment & Plan  Date of admission: 1/21/2021  Date: 1/24 Transfer orders from SICU  Date: 1/25 Rehab referral  1/26 SNF referral  Cleared  for discharge: No  Discharge delayed: No    Discharge date: tbd    Hypokalemia- (present on admission)  Assessment & Plan  Profound hypokalemia.  Empiric magnesium replacement.  Potassium replaced.  1/23 Normalized.  1/25 Supplementation discontinued.  Trend.    Essential hypertension- (present on admission)  Assessment & Plan  Chronic condition treated with lisinopril.  Resumed maintenance medication on admission.    Hypothyroid- (present on admission)  Assessment & Plan  Chronic condition treated with levothyroxine.  Resumed maintenance medication on admission.    Trauma- (present on admission)  Assessment & Plan  GLF.  Non Trauma Activation. Transfer from [facility] with head bleed.  Grayson López MD. Trauma Surgery.      Discussed patient condition with RN, , , Patient and trauma surgery. Dr. López

## 2021-01-26 NOTE — PROGRESS NOTES
Patient received from [facility]. AA0x1. (self). RUVALCABA. Afebrile. SR on cardiac monitor. Room air. Scattered bruising throughout  Body. Excoriation near perineum. Abdomen soft non distended. No signs of distress at this time. Will continue to monitor.

## 2021-01-27 PROBLEM — E83.39 HYPOPHOSPHATEMIA: Status: RESOLVED | Noted: 2021-01-01 | Resolved: 2021-01-01

## 2021-01-27 PROBLEM — E87.6 HYPOKALEMIA: Status: RESOLVED | Noted: 2021-01-01 | Resolved: 2021-01-01

## 2021-01-27 PROBLEM — E83.42 HYPOMAGNESEMIA: Status: ACTIVE | Noted: 2021-01-01

## 2021-01-27 PROBLEM — R73.9 HYPERGLYCEMIA: Status: RESOLVED | Noted: 2021-01-01 | Resolved: 2021-01-01

## 2021-01-27 NOTE — PROGRESS NOTES
Neurosurgery Progress Note    Subjective:  No events  Some generalized HA  Ambulating with help      Exam:    A&O x4  PERRL, EOMI  Face symm, tongue midline  RUVALCABA with FS, no drift      BP  Min: 106/75  Max: 144/85  Pulse  Av.8  Min: 91  Max: 100  Resp  Av  Min: 16  Max: 18  Temp  Av.6 °C (97.8 °F)  Min: 36.2 °C (97.1 °F)  Max: 37.1 °C (98.7 °F)  SpO2  Av.8 %  Min: 92 %  Max: 95 %    No data recorded    Recent Labs     21  0334 21  0542   WBC 4.8 5.9   RBC 2.84* 2.80*   HEMOGLOBIN 10.3* 10.0*   HEMATOCRIT 30.3* 28.7*   .7* 102.5*   MCH 36.3* 35.7*   MCHC 34.0 34.8   RDW 59.7* 60.9*   PLATELETCT 169 245   MPV 10.2 10.7     Recent Labs     21  0334 21  0542   SODIUM 131* 128*   POTASSIUM 5.2 4.4   CHLORIDE 101 96   CO2 21 22   GLUCOSE 109* 109*   BUN 4* 5*   CREATININE 0.53 0.56   CALCIUM 8.5 8.3*               Intake/Output       21 - 21 0659 21 - 21 0659      1900-0659 Total  Total       Intake    P.O.  100  -- 100  --  -- --    P.O. 100 -- 100 -- -- --    Total Intake 100 -- 100 -- -- --       Output    Stool  --  -- --  --  -- --    Number of Times Stooled 1 x 1 x 2 x -- -- --    Total Output -- -- -- -- -- --       Net I/O     100 -- 100 -- -- --            Intake/Output Summary (Last 24 hours) at 2021 0911  Last data filed at 2021 1000  Gross per 24 hour   Intake 100 ml   Output --   Net 100 ml            • fludrocortisone  0.2 mg Q12HRS   • LORazepam  0.5 mg Q4HRS PRN   • sodium chloride  1 g TID WITH MEALS   • thiamine  100 mg DAILY    And   • multivitamin  1 Tab DAILY    And   • folic acid  1 mg DAILY   • Respiratory Therapy Consult   Continuous RT   • Pharmacy Consult Request  1 Each PHARMACY TO DOSE   • docusate sodium  100 mg BID   • senna-docusate  1 Tab Nightly   • senna-docusate  1 Tab Q24HRS PRN   • polyethylene glycol/lytes  1 Packet BID   • magnesium hydroxide  30 mL DAILY   •  bisacodyl  10 mg Q24HRS PRN   • fleet  1 Each Once PRN   • levETIRAcetam  500 mg BID   • ondansetron  4 mg Q4HRS PRN   • dexamethasone  2 mg Q12HRS   • lisinopril  5 mg DAILY   • levothyroxine  100 mcg QAM   • lidocaine  2 Patch Q24HR       Assessment and Plan:  Hospital day #5 contusion/SDH  Prophylactic anticoagulation: no         Start date/time: tbd      Continue - dec 2 mg bid  Correct hyponatremia, pending AM labs, keep Na above 130- on florinef, salt  Sodium 127 today, increased salt tabs/florinef by trauma team  MRI brain completed 1/25 - rt sa sdh, rt p/o cavernoma. No operative management per Dr. Atilio COFFEY for Q4hr neuro checks  keppra x7d  Pt/Ot   Placement pending. OK to transfer to SNF or rehab from neuro surgery stand point.    Follow up in the office with head CT in 2 weeks, our office will arrange  No blood thinners, ASA or NSAIDs

## 2021-01-27 NOTE — THERAPY
Occupational Therapy  Daily Treatment     Patient Name: Jennifer Koroma  Age:  70 y.o., Sex:  female  Medical Record #: [id number]  Today's Date: 1/27/2021       Precautions: Fall Risk  Comments: ETOH     Assessment    Pt seen for OT tx. Continues to be limited by decreased activity tolerance, balance deficits, inattention and poor safety awareness impacting ability to complete ADLs and ADL transfers independently. Continues to require mod cues for safety and attention during session. Tolerated amb w/ FWW and min A for balance and safety. Will continue to benefit from OT services while in house.     Plan    Continue current treatment plan.    DC Equipment Recommendations: Unable to determine at this time  Discharge Recommendations: Recommend post-acute placement for additional occupational therapy services prior to discharge home          01/27/21 0917   Cognition    Comments pleasant and cooperative, poor safety awareness    Active ROM Upper Body   Active ROM Upper Body  X   Comments limited L shoulder, reports this is baseline   Strength Upper Body   Upper Body Strength  WDL   Balance   Sitting Balance (Static) Fair   Sitting Balance (Dynamic) Fair -   Standing Balance (Static) Fair -   Standing Balance (Dynamic) Fair -   Weight Shift Sitting Fair   Weight Shift Standing Fair   Bed Mobility    Supine to Sit Supervised   Sit to Supine Supervised   Scooting Supervised   Rolling Supervised   Activities of Daily Living   Grooming Minimal Assist;Standing   Upper Body Dressing Minimal Assist   Lower Body Dressing Moderate Assist   Toileting Minimal Assist   Functional Mobility   Sit to Stand Minimal Assist   Bed, Chair, Wheelchair Transfer Minimal Assist   Toilet Transfers Minimal Assist   Short Term Goals   Short Term Goal # 1 min A with UB dressing   Goal Outcome # 1 Progressing as expected   Short Term Goal # 2 Mod A with LB dressing   Goal Outcome # 2 Progressing as expected   Short Term Goal # 3 Mod A with ADL  txfs   Goal Outcome # 3 Goal met   Anticipated Discharge Equipment and Recommendations   DC Equipment Recommendations Unable to determine at this time   Discharge Recommendations Recommend post-acute placement for additional occupational therapy services prior to discharge home

## 2021-01-27 NOTE — DISCHARGE PLANNING
Anticipated Discharge Disposition: SNF    Action: Lsw met with pt at bedside to discuss SNF choice. Pt stated that although she lives alone, pt has plenty of support from friends. Pt stated that she did not want to choose facilities in [place]. Pt is agreeable for blanket in [place]. Lsw faxed choice to [facility].    Barriers to Discharge: SNF acceptance    Plan: Lsw to f/u

## 2021-01-27 NOTE — PROGRESS NOTES
Trauma / Surgical Daily Progress Note    Date of Service  1/27/2021    Chief Complaint  70 y.o. female admitted 1/21/2021 with Subdural hematoma    Interval Events    GCS 14. Cooperative. Mild tremor. No PRN ativan overnight.     - DC CIWA.  - AM labs pending.   - Disposition: SNF referrals.   - Counseled.     Review of Systems  Review of Systems   Constitutional: Positive for malaise/fatigue. Negative for chills and fever.   HENT: Negative for hearing loss.    Eyes: Negative for blurred vision and double vision.   Respiratory: Negative for shortness of breath.    Cardiovascular: Negative for chest pain.   Gastrointestinal: Negative for abdominal pain, constipation (BM 1/26), nausea and vomiting.   Genitourinary: Negative for dysuria (voiding).   Musculoskeletal: Positive for myalgias. Negative for back pain, joint pain and neck pain.   Skin: Negative for rash.   Neurological: Positive for tremors (mild) and headaches. Negative for dizziness, tingling, sensory change, speech change and focal weakness.        Vital Signs  Temp:  [36.2 °C (97.1 °F)-37.1 °C (98.7 °F)] 36.4 °C (97.5 °F)  Pulse:  [] 98  Resp:  [16-18] 17  BP: (106-144)/(69-90) 144/85  SpO2:  [92 %-95 %] 94 %    Physical Exam  Physical Exam  Vitals signs and nursing note reviewed.   Constitutional:       General: She is awake. She is not in acute distress.     Appearance: She is overweight. She is not ill-appearing, toxic-appearing or diaphoretic.   HENT:      Head: Normocephalic.      Comments: Chin and left cheek ecchymosis     Nose: Nose normal.      Mouth/Throat:      Mouth: Mucous membranes are moist.      Pharynx: Oropharynx is clear.   Eyes:      Extraocular Movements: Extraocular movements intact.      Conjunctiva/sclera: Conjunctivae normal.   Neck:      Musculoskeletal: Neck supple.   Cardiovascular:      Rate and Rhythm: Normal rate.      Pulses: Normal pulses.   Pulmonary:      Effort: Pulmonary effort is normal. No respiratory distress.  "  Chest:      Chest wall: No tenderness.   Abdominal:      General: There is no distension.      Tenderness: There is no abdominal tenderness. There is no guarding or rebound.   Musculoskeletal:         General: No tenderness.      Comments: Moves all extremities   Skin:     General: Skin is warm and dry.      Capillary Refill: Capillary refill takes less than 2 seconds.   Neurological:      GCS: GCS eye subscore is 4. GCS verbal subscore is 4. GCS motor subscore is 6.   Psychiatric:         Mood and Affect: Affect is flat.         Speech: Speech is delayed.         Behavior: Behavior is cooperative.         Laboratory  No results found for this or any previous visit (from the past 24 hour(s)).    Fluids    Intake/Output Summary (Last 24 hours) at 1/27/2021 0819  Last data filed at 1/26/2021 1000  Gross per 24 hour   Intake 100 ml   Output --   Net 100 ml       Core Measures & Quality Metrics  Labs reviewed, Medications reviewed and Radiology images reviewed  Ross catheter: No Ross      DVT Prophylaxis: Contraindicated - High bleeding risk  DVT prophylaxis - mechanical: SCDs  Ulcer prophylaxis: Not indicated    Assessed for rehab: Patient was assess for and/or received rehabilitation services during this hospitalization    RAP Score Total: 6    ETOH Screening     Assessment complete date: 1/24/2021 (Admission BA positive, CAGE not completed)  Intervention: yes. Patient response to intervention: \"I didnt drink today\".   Patient does not demonstrate understanding of intervention. Patient does not agree to follow-up.   has been contacted.       Assessment/Plan  Traumatic subdural hematoma (HCC)- (present on admission)  Assessment & Plan  4mm right lateral subdural hematoma without mass-effect.  Repeat interval CT imaging of the brain stable.  1/25 MR imaging of the brain completed, awaiting neurosurgery review.  Non-operative management.  Post traumatic pharmacologic seizure prophylaxis for 1 " week.  Speech Language Pathology cognitive evaluation.   Maikol Trimble MD. Neurosurgeon. [facility].    Hypomagnesemia  Assessment & Plan  1/27 Supplement 2 grams IV magnesium.     Discharge planning issues- (present on admission)  Assessment & Plan  Date of admission: 1/21/2021  Date: 1/24 Transfer orders from SICU  Date: 1/25 Rehab referral  1/26 SNF referral  Cleared for discharge: No  Discharge delayed: No    Discharge date: tbd     Contraindication to deep vein thrombosis (DVT) prophylaxis- (present on admission)  Assessment & Plan  Systemic anticoagulation contraindicated secondary to elevated bleeding risk.  RAP score 6.  1/25 Trauma screening bilateral lower extremity venous duplex negative for above knee DVT.  Ambulate TID.     History of alcohol abuse- (present on admission)  Assessment & Plan  History of alcohol abuse with withdrawal symptoms noted in Emergency Department.  Alcohol withdrawal protocol initiated on admission.  1/26 CIWA protocol on marques.  1/27 DC CIWA.     Hyponatremia- (present on admission)  Assessment & Plan  Asymptomatic, likely longstanding hyponatremia.  1/25 1500ml fluid restriction initiated. NaPhos replacement.  1/26 Salt tabs and florinef initiated given head bleed.  Goal > 130 per neurosurgery.  Trend.    Acute pain of both knees- (present on admission)  Assessment & Plan  1/26 Reporting bilateral knee pain.  Dedicated imaging negative for acute injury.     Screening examination for infectious disease- (present on admission)  Assessment & Plan  Admission SARS-CoV-2 testing negative. Repeat SARS-CoV-2 testing not indicated. Isolation precautions de-escalated.    Essential hypertension- (present on admission)  Assessment & Plan  Chronic condition treated with lisinopril.  Resumed maintenance medication on admission.    Hypothyroid- (present on admission)  Assessment & Plan  Chronic condition treated with levothyroxine.  Resumed maintenance medication on  admission.    Trauma- (present on admission)  Assessment & Plan  GLF.  Non Trauma Activation. Transfer from [facility] with head bleed.  Grayson López MD. Trauma Surgery.       Discussed patient condition with Patient and trauma surgery, Dr. Grayson López.

## 2021-01-27 NOTE — THERAPY
Occupational Therapy  Daily Treatment     Patient Name: Jennifer Koroma  Age:  70 y.o., Sex:  female  Medical Record #: [id number]  Today's Date: 1/26/2021       Precautions: Fall Risk  Comments: ETOH    Assessment    Pt seen for OT tx. Continues to be limited by decreased activity tolerance, generalized weakness and poor safety awareness impacting ability to complete ADLs and ADL transfers independently. Amb w/ FWW in room to BR w/ min A for balance and safety. Pleasant and cooperative during session. Required mod A for LB dressing, reports she had difficulty w/ this at baseline. Will continue to benefit from OT services while in house.     Plan    Continue current treatment plan.    DC Equipment Recommendations: Unable to determine at this time  Discharge Recommendations: Recommend post-acute placement for additional occupational therapy services prior to discharge home       01/26/21 1201   Cognition    Comments pleasant and cooperative. poor safety awareness   Active ROM Upper Body   Active ROM Upper Body  X   Comments limited L shoulder, states this is baseline    Strength Upper Body   Upper Body Strength  WDL   Balance   Sitting Balance (Static) Fair   Sitting Balance (Dynamic) Fair -   Standing Balance (Static) Fair -   Standing Balance (Dynamic) Fair -   Weight Shift Sitting Fair   Weight Shift Standing Fair   Bed Mobility    Supine to Sit Supervised   Sit to Supine Supervised   Scooting Supervised   Rolling Supervised   Activities of Daily Living   Grooming Minimal Assist;Standing   Upper Body Dressing Minimal Assist   Lower Body Dressing Moderate Assist   Toileting Minimal Assist   Functional Mobility   Sit to Stand Minimal Assist   Bed, Chair, Wheelchair Transfer Minimal Assist   Toilet Transfers Minimal Assist   Short Term Goals   Short Term Goal # 1 min A with UB dressing   Goal Outcome # 1 Progressing as expected   Short Term Goal # 2 Mod A with LB dressing   Goal Outcome # 2 Progressing as expected    Short Term Goal # 3 Mod A with ADL txfs   Goal Outcome # 3 Goal met   Anticipated Discharge Equipment and Recommendations   DC Equipment Recommendations Unable to determine at this time   Discharge Recommendations Recommend post-acute placement for additional occupational therapy services prior to discharge home

## 2021-01-27 NOTE — DISCHARGE PLANNING
TCC  Rehab Impairment Code: 0002.22 - Brain Dysfunction: Traumatic, Closed Injury  -Pending updated OT eval, last one 4d ago  -Pending cog eval with SLP    -With continued confusion, will need discharge support to go home.     Given limited discharge support, recommend SNF when medically cleared.    Thank you for referral.

## 2021-01-27 NOTE — THERAPY
"PT Contact Note    Reviewed pts progress with fxnl mob and progressed goals of POC as appropriate. Pt to cont with PTA.       01/26/21 9721   Short Term Goals    Short Term Goal # 1 Pt will demonstrate bed mobility using railings and Michele for LE's in 6 visits to prepare for transfer tasks.   Goal Outcome # 1 Goal met, new goal added   Short Term Goal # 1 B  Pt will perform all fxnl transfers with LRAD and SPV within 6 visits to improve safe transfers for DC home.   Short Term Goal # 2 Pt will demonstrate ability to STS EOB spv w/ FWW for safety in 6 visits to prepare for ambulation.   Short Term Goal # 3 Pt will demonstrate ability to ascend/descend 4 stairs (4\") using UE support on railings spv in 6 visits to be able to enter/exit home.   Short Term Goal # 4 Pt will amb >50ft with LRAD and SPV within 6 visits to progress toward limited household negotiation.     Kathryn Stephenson, PT, DPT  Ext. [phone]  "

## 2021-01-28 NOTE — PROGRESS NOTES
Trauma / Surgical Daily Progress Note    Date of Service  1/28/2021    Chief Complaint  70 y.o. female admitted 1/21/2021 with Subdural hematoma    Interval Events    Mentation improved. GCS 15.   Serum sodium improved.    Neurosurgery signed off.     - PT/OT/Speech evals.   - Continue oral sodium supplementation and florinef.   - Disposition: Home versus SNF pending therapy recommendations.  Pt is clear for post acute services.  - Counseled     Review of Systems  Review of Systems   Constitutional: Positive for malaise/fatigue. Negative for chills and fever.   HENT: Negative for hearing loss.    Eyes: Negative for blurred vision and double vision.   Respiratory: Negative for shortness of breath.    Cardiovascular: Negative for chest pain.   Gastrointestinal: Negative for abdominal pain, constipation (BM 1/27), nausea and vomiting.   Genitourinary: Negative for dysuria (voiding).   Musculoskeletal: Positive for myalgias. Negative for back pain, joint pain and neck pain.   Skin: Negative for rash.   Neurological: Negative for dizziness, tingling, tremors, sensory change, speech change, focal weakness and headaches.   Psychiatric/Behavioral: Positive for substance abuse.        Vital Signs  Temp:  [36.3 °C (97.3 °F)-37.2 °C (99 °F)] 36.8 °C (98.2 °F)  Pulse:  [80-99] 80  Resp:  [16-18] 16  BP: (116-143)/(85-97) 143/91  SpO2:  [93 %-96 %] 94 %    Physical Exam  Physical Exam  Vitals signs and nursing note reviewed.   Constitutional:       General: She is awake. She is not in acute distress.     Appearance: She is overweight. She is not ill-appearing, toxic-appearing or diaphoretic.   HENT:      Head: Normocephalic.      Comments: Chin and left cheek ecchymosis     Nose: Nose normal.      Mouth/Throat:      Mouth: Mucous membranes are moist.      Pharynx: Oropharynx is clear.   Eyes:      Extraocular Movements: Extraocular movements intact.      Conjunctiva/sclera: Conjunctivae normal.   Neck:      Musculoskeletal: Neck  supple.   Cardiovascular:      Rate and Rhythm: Normal rate.      Pulses: Normal pulses.   Pulmonary:      Effort: Pulmonary effort is normal. No respiratory distress.   Chest:      Chest wall: No tenderness.   Abdominal:      General: There is no distension.      Tenderness: There is no abdominal tenderness. There is no guarding or rebound.   Musculoskeletal:         General: No tenderness.      Comments: Moves all extremities   Skin:     General: Skin is warm and dry.      Capillary Refill: Capillary refill takes less than 2 seconds.   Neurological:      GCS: GCS eye subscore is 4. GCS verbal subscore is 5. GCS motor subscore is 6.   Psychiatric:         Speech: Speech normal.         Behavior: Behavior is cooperative.         Laboratory  Recent Results (from the past 24 hour(s))   Magnesium: Every Monday and Thursday AM    Collection Time: 01/28/21  3:35 AM   Result Value Ref Range    Magnesium 1.6 1.5 - 2.5 mg/dL   Phosphorus: Every Monday and Thursday AM    Collection Time: 01/28/21  3:35 AM   Result Value Ref Range    Phosphorus 3.1 2.5 - 4.5 mg/dL   Basic Metabolic Panel    Collection Time: 01/28/21  3:35 AM   Result Value Ref Range    Sodium 133 (L) 135 - 145 mmol/L    Potassium 3.9 3.6 - 5.5 mmol/L    Chloride 101 96 - 112 mmol/L    Co2 23 20 - 33 mmol/L    Glucose 105 (H) 65 - 99 mg/dL    Bun 6 (L) 8 - 22 mg/dL    Creatinine 0.51 0.50 - 1.40 mg/dL    Calcium 8.4 (L) 8.5 - 10.5 mg/dL    Anion Gap 9.0 7.0 - 16.0   ESTIMATED GFR    Collection Time: 01/28/21  3:35 AM   Result Value Ref Range    GFR If African American >60 >60 mL/min/1.73 m 2    GFR If Non African American >60 >60 mL/min/1.73 m 2       Fluids  No intake or output data in the 24 hours ending 01/28/21 1016    Core Measures & Quality Metrics  Labs reviewed, Medications reviewed and Radiology images reviewed  Ross catheter: No Ross      DVT Prophylaxis: Contraindicated - High bleeding risk  DVT prophylaxis - mechanical: SCDs  Ulcer prophylaxis:  "Not indicated    Assessed for rehab: Patient was assess for and/or received rehabilitation services during this hospitalization    RAP Score Total: 6    ETOH Screening     Assessment complete date: 1/24/2021 (Admission BA positive, CAGE not completed)  Intervention: yes. Patient response to intervention: \"I didnt drink today\".   Patient does not demonstrate understanding of intervention. Patient does not agree to follow-up.   has been contacted.       Assessment/Plan  Traumatic subdural hematoma (HCC)- (present on admission)  Assessment & Plan  4mm right lateral subdural hematoma without mass-effect.  Repeat interval CT imaging of the brain stable.  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  Post traumatic pharmacologic seizure prophylaxis for 1 week.  Speech Language Pathology cognitive evaluation.    1/28 Neurosurgery signed off.  Follow up in 2 weeks time with a repeat non-contrast CT of the head.  Maikol Trimble MD. Neurosurgeon. [facility].    Hypomagnesemia  Assessment & Plan  1/27 Supplement 2 grams IV magnesium.   1/28 Initiate oral magnesium supplementation.    Discharge planning issues- (present on admission)  Assessment & Plan  Date of admission: 1/21/2021  Date: 1/24 Transfer orders from SICU  Date: 1/25 Rehab referral  1/26 SNF referral  Cleared for discharge: No  Discharge delayed: No    Discharge date: tbd.     Contraindication to deep vein thrombosis (DVT) prophylaxis- (present on admission)  Assessment & Plan  Systemic anticoagulation contraindicated secondary to elevated bleeding risk.  RAP score 6.  1/25 Trauma screening bilateral lower extremity venous duplex negative for above knee DVT.   Ambulate TID.      Hyponatremia- (present on admission)  Assessment & Plan  Asymptomatic, likely longstanding hyponatremia.  1/25 1500ml fluid restriction " initiated. NaPhos replacement.  1/26 Salt tabs and florinef initiated given head bleed.  1/27 Increase oral sodium supplementation and florinef.   1/28 Serum sodium trend up.  Goal > 130 per neurosurgery.  Trend.    Acute pain of both knees- (present on admission)  Assessment & Plan  1/26 Reporting bilateral knee pain.  Dedicated imaging negative for acute injury.     Screening examination for infectious disease- (present on admission)  Assessment & Plan  Admission SARS-CoV-2 testing negative. Repeat SARS-CoV-2 testing not indicated. Isolation precautions de-escalated.    History of alcohol abuse- (present on admission)  Assessment & Plan  History of alcohol abuse with withdrawal symptoms noted in Emergency Department.  Alcohol withdrawal protocol initiated on admission.  1/26 CIWA protocol on marques.  1/27 DC CIWA.    Essential hypertension- (present on admission)  Assessment & Plan  Chronic condition treated with lisinopril.  Resumed maintenance medication on admission.    Hypothyroid- (present on admission)  Assessment & Plan  Chronic condition treated with levothyroxine.  Resumed maintenance medication on admission.    Trauma- (present on admission)  Assessment & Plan  GLF.  Non Trauma Activation. Transfer from [facility] with head bleed.  Grayson López MD. Trauma Surgery.        Discussed patient condition with Patient and trauma surgery, Dr. Grayson López.

## 2021-01-28 NOTE — DISCHARGE PLANNING
Received Choice form at [phone]  Agency/Facility Name: [facility]  Referral sent per Choice form @ [phone]

## 2021-01-28 NOTE — DISCHARGE INSTRUCTIONS
Discharge Instructions    Discharged to other by ambulance with escort. Discharged via ambulance, hospital escort: Yes.  Special equipment needed: Not Applicable    Be sure to schedule a follow-up appointment with your primary care doctor or any specialists as instructed.     Discharge Plan:        I understand that a diet low in cholesterol, fat, and sodium is recommended for good health. Unless I have been given specific instructions below for another diet, I accept this instruction as my diet prescription.   Other diet: Regular Diet with 1500 cc Fluid Restriction    · Is patient discharged on Warfarin / Coumadin? No         Subdural Hematoma    A subdural hematoma is a collection of blood between the brain and its outer covering (dura). As the amount of blood increases, pressure builds on the brain.  There are two types of subdural hematomas:  · Acute. This type develops shortly after a hard, direct hit to the head and causes blood to collect very quickly. This is a medical emergency. If it is not diagnosed and treated quickly, it can lead to severe brain injury or death.  · Chronic. This is when bleeding develops more slowly, over weeks or months. In some cases, this type does not cause symptoms.  What are the causes?  This condition is caused by bleeding (hemorrhage) from a broken (ruptured) blood vessel. In most cases, a blood vessel ruptures and bleeds because of a head injury, such as from a hard, direct hit. Head injuries can happen in car accidents, falls, assaults, or while playing sports.  In rare cases, a hemorrhage can happen without a known cause (spontaneously), especially if you take blood thinners (anticoagulants).  What increases the risk?  This condition is more likely to develop in:  · Older people.  · Infants.  · People who take blood thinners.  · People who have head injuries.  · People who abuse alcohol.  What are the signs or symptoms?  Symptoms of this condition can vary depending on the  size of the hematoma. Symptoms can be mild, severe, or life-threatening. They include:  · Headaches.  · Nausea or vomiting.  · Changes in vision, such as double vision or loss of vision.  · Changes in speech or trouble understanding what people say.  · Loss of balance or trouble walking.  · Weakness, numbness, or tingling in the arms or legs, especially on one side of the body.  · Seizures.  · Change in personality.  · Increased sleepiness.  · Memory loss.  · Loss of consciousness.  · Coma.  Symptoms of acute subdural hematoma can develop over minutes or hours. Symptoms of chronic subdural hematoma may develop over weeks or months.  How is this diagnosed?  This condition is diagnosed based on the results of:  · A physical exam.  · Tests of strength, reflexes, coordination, senses, manner of walking (gait), and facial and eye movements (neurological exam).  · Imaging tests, such as an MRI or a CT scan.  How is this treated?  Treatment for this condition depends on the type of hematoma and how severe it is.  Treatment for acute hematoma may include:  · Emergency surgery to drain blood or remove a blood clot.  · Medicines that help the body get rid of excess fluids (diuretics). These may help to reduce pressure in the brain.  · Assisted breathing (ventilation).  Treatment for chronic hematoma may include:  · Observation and bed rest at the hospital.  · Surgery.  If you take blood thinners, you may need to stop taking them for a short time. You may also be given anti-seizure (anticonvulsant) medicine.  Sometimes, no treatment is needed for chronic subdural hematoma.  Follow these instructions at home:  Activity  · Avoid situations where you could injure your head again, such as in competitive sports, downhill snow sports, and horseback riding. Do not do these activities until your health care provider approves.  ? Wear protective gear, such as a helmet, when participating in activities such as biking or contact  sports.  · Avoid too much visual stimulation while recovering. This means limiting how much you read and limiting your screen time on a smart phone, tablet, computer, or TV.  · Rest as told by your health care provider. Rest helps the brain heal.  · Try to avoid activities that cause physical or mental stress. Return to work or school as told by your health care provider.  · Do not lift anything that is heavier than 5 lb (2.3 kg), or the limit you are told, until your health care provider says that it is safe.  · Do not drive, ride a bike, or use heavy machinery until your health care provider approves.  · Always wear your seat belt when you are in a motor vehicle.  Alcohol use  · Do not drink alcohol if your health care provider tells you not to drink.  · If you drink alcohol, limit how much you use to:  ? 0-1 drink a day for women.  ? 0-2 drinks a day for men.  General instructions  · Monitor your symptoms, and ask people around you to do the same. Recovery from brain injuries varies. Talk with your health care provider about what to expect.  · Take over-the-counter and prescription medicines only as told by your health care provider. Do not take blood thinners or NSAIDs unless your health care provider approves. These include aspirin, ibuprofen, naproxen, and warfarin.  · Keep your home environment safe to reduce the risk of falling.  · Keep all follow-up visits as told by your health care provider. This is important.  Where to find more information  · National [place] of Neurological Disorders and Stroke: www.ninds.nih.gov  · American Academy of Neurology (AAN): www.aan.com  · Brain Injury Association of [place]: www.biausa.org  Get help right away if you:  · Are taking blood thinners and you fall or you experience minor trauma to the head. If you take any blood thinners, even a very small injury can cause a subdural hematoma.  · Have a bleeding disorder and you fall or you experience minor trauma to the  head.  · Develop any of the following symptoms after a head injury:  ? Clear fluid draining from your nose or ears.  ? Nausea or vomiting.  ? Changes in speech or trouble understanding what people say.  ? Seizures.  ? Drowsiness or a decrease in alertness.  ? Double vision.  ? Numbness or inability to move (paralysis) in any part of your body.  ? Difficulty walking or poor coordination.  ? Difficulty thinking.  ? Confusion or forgetfulness.  ? Personality changes.  ? Irrational or aggressive behavior.  These symptoms may represent a serious problem that is an emergency. Do not wait to see if the symptoms will go away. Get medical help right away. Call your local emergency services (911 in the [place] Do not drive yourself to the hospital.  Summary  · A subdural hematoma is a collection of blood between the brain and its outer covering (dura).  · Treatment for this condition depends on what type of subdural hematoma you have and how severe it is.  · Symptoms can vary from mild to severe to life-threatening.  · Monitor your symptoms, and ask others around you to do the same.  This information is not intended to replace advice given to you by your health care provider. Make sure you discuss any questions you have with your health care provider.  Document Released: 11/04/2005 Document Revised: 11/18/2019 Document Reviewed: 11/18/2019  [employer] Patient Education © 2020 [employer].        Depression / Suicide Risk    As you are discharged from this [facility] facility, it is important to learn how to keep safe from harming yourself.    Recognize the warning signs:  · Abrupt changes in personality, positive or negative- including increase in energy   · Giving away possessions  · Change in eating patterns- significant weight changes-  positive or negative  · Change in sleeping patterns- unable to sleep or sleeping all the time   · Unwillingness or inability to communicate  · Depression  · Unusual sadness, discouragement  and loneliness  · Talk of wanting to die  · Neglect of personal appearance   · Rebelliousness- reckless behavior  · Withdrawal from people/activities they love  · Confusion- inability to concentrate     If you or a loved one observes any of these behaviors or has concerns about self-harm, here's what you can do:  · Talk about it- your feelings and reasons for harming yourself  · Remove any means that you might use to hurt yourself (examples: pills, rope, extension cords, firearm)  · Get professional help from the community (Mental Health, Substance Abuse, psychological counseling)  · Do not be alone:Call your Safe Contact- someone whom you trust who will be there for you.  · Call your local CRISIS HOTLINE 809-9127 or 376-512-0433  · Call your local Children's Mobile Crisis Response Team [place] (439) 779-7015 or www.[employer]  · Call the toll free National Suicide Prevention Hotlines   · National Suicide Prevention Lifeline 151-484-CLBQ (5099)  · National Hope Line Network 800-SUICIDE (220-6682)

## 2021-01-28 NOTE — DISCHARGE PLANNING
Agency/Facility Name: [facility]  Spoke To: Yulissa  Outcome: Unable to provide transport, van is busy with appointments today.    Agency/Facility Name: [facility]  Spoke To: Dennis  Outcome: Unable to transport to [place] due to road conditions.     Spoke to Zoey @ [facility]    Transport is scheduled for 1/28 @[phone] going to [facility]. Quoted $230.46 for approved services. Reservation # [phone].     Yulissa @ [facility] notified.   NADIA Adair notified.

## 2021-01-28 NOTE — PROGRESS NOTES
Neurosurgery Progress Note    Subjective:  No events  Some generalized HA  Ambulating w/ fww  eating      Exam:    A&O x4  PERRL, EOMI  Face symm, tongue midline  RUVALCABA with FS, no drift      BP  Min: 116/91  Max: 143/91  Pulse  Av  Min: 80  Max: 99  Resp  Av.6  Min: 16  Max: 18  Temp  Av.7 °C (98 °F)  Min: 36.3 °C (97.3 °F)  Max: 37.2 °C (99 °F)  SpO2  Av.6 %  Min: 93 %  Max: 96 %    No data recorded    Recent Labs     21  0542   WBC 5.9   RBC 2.80*   HEMOGLOBIN 10.0*   HEMATOCRIT 28.7*   .5*   MCH 35.7*   MCHC 34.8   RDW 60.9*   PLATELETCT 245   MPV 10.7     Recent Labs     21  0542 21  1016 21  0335   SODIUM 128* 127* 133*   POTASSIUM 4.4 4.0 3.9   CHLORIDE 96 97 101   CO2 22 20 23   GLUCOSE 109* 163* 105*   BUN 5* 6* 6*   CREATININE 0.56 0.56 0.51   CALCIUM 8.3* 8.7 8.4*               Intake/Output       21 07 - 21 0659 21 07 - 21 0659      3629-4110 5030-1032 Total 7536-9559 8302-3447 Total       Intake    P.O.  180  -- 180  --  -- --    P.O. 180 -- 180 -- -- --    Total Intake 180 -- 180 -- -- --       Output    Urine  --  -- --  --  -- --    Number of Times Voided 5 x -- 5 x -- -- --    Stool  --  -- --  --  -- --    Number of Times Stooled 5 x -- 5 x -- -- --    Total Output -- -- -- -- -- --       Net I/O     180 -- 180 -- -- --          No intake or output data in the 24 hours ending 21 1004         • fludrocortisone  0.2 mg Q12HRS   • LORazepam  0.5 mg Q4HRS PRN   • sodium chloride  2 g TID WITH MEALS   • acetaminophen  650 mg Q6HRS PRN   • thiamine  100 mg DAILY    And   • multivitamin  1 Tab DAILY    And   • folic acid  1 mg DAILY   • Respiratory Therapy Consult   Continuous RT   • Pharmacy Consult Request  1 Each PHARMACY TO DOSE   • docusate sodium  100 mg BID   • senna-docusate  1 Tab Nightly   • senna-docusate  1 Tab Q24HRS PRN   • polyethylene glycol/lytes  1 Packet BID   • magnesium hydroxide  30 mL DAILY   •  bisacodyl  10 mg Q24HRS PRN   • fleet  1 Each Once PRN   • levETIRAcetam  500 mg BID   • ondansetron  4 mg Q4HRS PRN   • dexamethasone  2 mg Q12HRS   • lisinopril  5 mg DAILY   • levothyroxine  100 mcg QAM   • lidocaine  2 Patch Q24HR       Assessment and Plan:  Hospital day #6 contusion/SDH  Prophylactic anticoagulation: no         Start date/time: tbd      Continue - dec 2 mg bid  Correct hyponatremia, na 133 this am, keep Na above 130- on florinef, salt    MRI brain completed 1/25 - rt sa sdh, rt p/o cavernoma. No operative management per Dr. Trimble     Q4hr neuro checks  keppra x7d  Pt/Ot   Placement pending. OK to transfer to SNF or rehab from neuro surgery stand point.    Follow up in the office with head CT in 2 weeks, our office will arrange  No blood thinners, ASA or NSAIDs

## 2021-01-28 NOTE — DISCHARGE SUMMARY
Trauma Discharge Summary    DATE OF ADMISSION: 1/21/2021    DATE OF DISCHARGE: 1/28/2021    LENGTH OF STAY: 7 days     ATTENDING PHYSICIAN: Grayson López M.D.    CONSULTING PHYSICIAN:   1. Maikol Trimble M.D., Neurosurgery  2. Rhys Benavides M.D. Physical Medicine and Rehabilitation.    DISCHARGE DIAGNOSIS:  1. Traumatic subdural hematoma   2. Hyponatremia  3. Contraindication to deep vein thrombosis prophylaxis  4. Essential hypertension   5. History of alcohol abuse  6. Hypomagnesemia  7. Hyponatremia   8. Hypohophatemia  9. Hypothyroid   10. Screening examination for infectious disease  11. Trauma     PROCEDURES:  1. None     HISTORY OF PRESENT ILLNESS: The patient is a 70 y.o. female with a reported history of alcohol abuse and frequent falls over several days prior to admission.  Review of the records indicates she was injured a mechanical ground level fall and there was a probable brief loss of consciousness. Initial evaluation was completed a [facility] with CT imaging demonstrating a subdural hematoma.   She was subsequently transferred to [facility] for definite trauma care. She was triaged as a non-trauma activation in accordance with established pre-hospital protocols.    HOSPITAL COURSE: On arrival, she underwent extensive radiographic and laboratory studies and was admitted to the critical care team under the direction and supervision of Dr. Grayson López. She sustained the listed injuries and incurred the listed diagnosis during her stay.Admission SARS-CoV-2 testing was negative.    She  transferred from the emergency department to the Trauma Intensive Care Unit for ongoing evaluation and completion a tertiary exam was performed. Dr. Maikol Trimble, neurosurgery, was consulted for a 4mm right lateral subdural hematoma without mass-effect. She was managed nonoperatively and with serial neurological and radiologic evaluations as well as 7 day course of seizure  prophylaxis, Keppra, and steroids. 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  Currently, the patient is on decadron 2mg tablets Q 12hrs per neurosurgery.    Past medical history was significant for alcohol abuse with withdrawal symptoms noted in the Emergency Room.  Alcohol withdrawal protocol was initiated on admission and CIWA protocol was initiated on transfer to the [facility].     Laboratory demonstrated hyponatremia and hypomagnesemia.  Her hyponatremia was likely multifactorial given her alcohol abuse and brain injury.  Currently, she is on oral sodium supplementation and florinef as well as 1500 cc fluid restriction.  Her hypomagnesemia was also likely secondary to her alcohol abuse.  She received oral and IV supplementation during her stay.     Systemic anticoagulation contraindicated secondary to elevated bleeding risk. Her Risk Assessment Profile score was 6. Trauma screening of the bilateral lower extremity on 1/25/2021 negative for above knee DVT. Pharmacological DVT prophylaxis will need to be cleared by neurosurgery prior to initiation.     Past medical history was significant for hypothyroidism and essential hypertension.  Her home medications lisinopril and levothyroxine were resumed on admission.    On the day of discharge, the patient was improving but still needed assistance with activities of daily living.  She was a GCS of 15 with overt focal neurological deficits.  She was afebrile and nontoxic in appearance.  He serum sodium was 133 and trending up.  Her serum sodium will need to be closely followed.      DISCHARGE PHYSICAL EXAM: See [facility] physical exam dated 1/28/2021    DISCHARGE MEDICATIONS:  I reviewed the patients controlled substance history and obtained a controlled substance use informed consent (if applicable) provided by [facility] and the patient has been prescribed.       Medication List      START taking these medications      Instructions   dexamethasone 2 MG tablet  Commonly known as: DECADRON   Take 1 Tab by mouth every 12 hours.  Dose: 2 mg     folic acid 1 MG Tabs  Start taking on: January 29, 2021  Commonly known as: FOLVITE   Take 1 Tab by mouth every day.  Dose: 1 mg     lidocaine 5 % Ptch  Commonly known as: LIDODERM   Place 2 Patches on the skin every 24 hours.  Dose: 2 Patch     multivitamin Tabs  Start taking on: January 29, 2021   Take 1 Tab by mouth every day.  Dose: 1 Tab     thiamine 100 MG tablet  Start taking on: January 29, 2021  Commonly known as: THIAMINE   Take 1 Tab by mouth every day.  Dose: 100 mg        CHANGE how you take these medications      Instructions   fludrocortisone 0.1 MG Tabs  What changed:   · how much to take  · when to take this  Commonly known as: FLORINEF   Take 2 Tabs by mouth every 12 hours.  Dose: 0.2 mg     levothyroxine 75 MCG Tabs  What changed: Another medication with the same name was removed. Continue taking this medication, and follow the directions you see here.  Commonly known as: SYNTHROID   Take 1 Tab by mouth Every morning on an empty stomach.  Dose: 75 mcg     lisinopril 5 MG Tabs  What changed: Another medication with the same name was removed. Continue taking this medication, and follow the directions you see here.  Commonly known as: PRINIVIL   Take 5 mg by mouth every day.  Dose: 5 mg     magnesium hydroxide 400 MG/5ML Susp  What changed:   · when to take this  · reasons to take this  Commonly known as: MILK OF MAGNESIA   Take 30 mL by mouth every day.  Dose: 30 mL        CONTINUE taking these medications      Instructions   acetaminophen 325 MG Tabs  Commonly known as: Tylenol   Take 2 Tabs by mouth every 6 hours as needed.  Dose: 650 mg     bisacodyl 10 MG Supp  Commonly known as: DULCOLAX   Insert 1 Suppository in rectum every 24 hours as needed (if  magnesium hydroxide ineffective).  Dose: 10 mg     docusate sodium 100 MG Caps   Take 100 mg by mouth 2 Times a Day.  Dose: 100 mg     ondansetron 4 MG/2ML Soln injection  Commonly known as: ZOFRAN   2 mL by Intravenous route every four hours as needed for Nausea.  Dose: 4 mg     Pharmacy Consult Request   1 Each by Other route PHARMACY TO DOSE.  Dose: 1 Each     polyethylene glycol/lytes 17 g Pack  Commonly known as: MIRALAX   Take 1 Packet by mouth 2 Times a Day.  Dose: 17 g     sodium chloride 1 GM Tabs  Commonly known as: SALT   Take 2 Tabs by mouth 3 times a day, with meals.  Dose: 2 g        STOP taking these medications    enoxaparin 30 MG/0.3ML Soln inj  Commonly known as: LOVENOX     fleet 7-19 GM/118ML Enem     haloperidol lactate 5 MG/ML Soln  Commonly known as: HALDOL     labetalol 5 MG/ML Soln  Commonly known as: NORMODYNE/TRANDATE     senna-docusate 8.6-50 MG Tabs  Commonly known as: PERICOLACE or SENOKOT S     VITAMIN B COMPLEX PO            DISPOSITION: The patient will be discharged in stable condition to inpatient rehab  on 1/28/2021. She will follow up with Dr. Maikol Maravilla, neurosurgery, in 2 weeks time with a repeat noncontrast CT of the head, as needed, or if symptoms worsen.      The patient has been extensively counseled and all questions have been answered. Special attention was paid to substance abuse cessation and the signs and symptoms of infection and neurological decompensation and to seek immediate medical attention if these develop. The patient demonstrates understanding and gives verbal compliance with discharge instructions.    TIME SPENT ON DISCHARGE: 55 minutes      ___________________________________________RUSSELL Bravo    DD: 1/28/2021 12:01 PM

## 2021-01-28 NOTE — CARE PLAN
Problem: Safety  Goal: Will remain free from falls  Outcome: PROGRESSING AS EXPECTED   Bed alarm on, fall precautions in place  Problem: Infection  Goal: Will remain free from infection  Outcome: PROGRESSING AS EXPECTED   Standard precautions in place

## 2021-01-28 NOTE — THERAPY
"Speech Language Pathology   Initial Assessment     Patient Name: Jennifer Koroma  AGE:  70 y.o., SEX:  female  Medical Record #: [id number]  Today's Date: 1/28/2021     Precautions  Precautions: (P) Fall Risk  Comments: (P) ETOH    Assessment    71 y/o admitted on 1/21 for GLF/subdural hematoma/EtOH. CT of head found \"Stable RIGHT hemispheric subdural hematoma, Hyperdense RIGHT occipital mass again seen.\"    Pt seen on this date for a cognitive-linguistic evaluation. Initially, pt agreeable to the evaluation, however, became very agitated as session progressed. She endorsed no hx of cognitive deficits at baseline, however, was seen by SLP in 2019 where \"mild to moderate deficits (found) in orientation, long term memory, complex information processing, reasoning and problem solving as well as complex verbal sequencing. 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 Pt endorsed that she lives alone and is able to manage medications, cook, and care for self, but has friends that are available \"to help whenever I need them\". Portions of the Cognistat were presented to the pt and mild deficits found in orientation (8) and attention was found to be within normal limits. Pt was disoriented to age (stated she was 71 vs 70), day of the month, and day of the week. She refused further formal assessment as she stated she \"always gets tested on this\". When asked to elaborate, she stated that she gets asked these questions for every job that she applies to. When asked if she knew current d/c plan (SNF) she stated that she was going home because \"I know what I'm doing\". Attempted to educate pt regarding POC, however, refused education and denied placement demonstrating poor insight and safety awareness. APRN updated on session and poor participation. Would recommend continuation of formal cognitive-linguistic evaluation as pt is willing to participate.    Plan    Mild " "deficits in orientation, poor insight/safety awareness. Refused further formal evaluation, APRN aware. Will re-attempt as pt is willing to participate    Recommend Speech Therapy 3 times per week until therapy goals are met for the following treatments:  Dysphagia Training and Patient / Family / Caregiver Education.    Discharge Recommendations: (P) Recommend post-acute placement for additional speech therapy services prior to discharge home       Objective       01/28/21 1105   Vitals   O2 (LPM) 0   O2 Delivery Device None - Room Air   Pain 0 - 10 Group   Therapist Pain Assessment Post Activity Pain Same as Prior to Activity;Nurse Notified;0   Prior Living Situation   Lives with - Patient's Self Care Capacity   (lives by self has support from friends \"whenever she needs.\")   Prior Level Of Function   Communication Impaired  (hx of cog deficits from 2019)   Swallow Within Functional Limits   Hearing Within Functional Limits for Evaluation   Vision Within Functional Limits for Evaluation   Patient's Primary Language English   Verbal Expression   Verbal Output Automatic Within Functional Limits (6-7)   Verbal Output Conversation Within Functional Limits (6-7)   Auditory Comprehension   Yes / No Questions: Personal Information Within Functional Limits (6-7)   Yes / No Questions: General Information Within Functional Limits (6-7)   Follows One Unit Commands Within Functional Limits (6-7)   Reading Comprehension   Comments needs further assessment   Written Expression   Comments needs further assessment   Cognitive-Linguistic   Cognitive-Linguistic (WDL) X   Level of Consciousness Alert   Sustained Attention Within Functional Limits (6-7)   Safety Awareness Moderate (3)   Insight into Deficits Moderate (3)   Social / Pragmatic Communication   Comments became agitated quickly   Short Term Goals   Short Term Goal # 1 Pt complete cognitive-linguistic evaluation          "

## 2021-01-28 NOTE — DISCHARGE PLANNING
Agency/Facility Name: [facility]  Spoke To: Yulissa  Outcome: Patient accepted, bed available today.    NADIA Adair notified.

## 2021-02-28 NOTE — PROGRESS NOTES
Assumed care of patient, bedside report received from, RN. Updated on POC, call light within reach and fall precautions in place. Bed locked and in lowest position. Patient instructed to call for assistance before getting out of bed. All questions answered, no other needs at this time.     Fall risk precautions in place.    FREE:[LAST:[Your primary care doctor],PHONE:[(   )    -],FAX:[(   )    -],FOLLOWUP:[1 week]]

## 2021-04-27 PROBLEM — Z99.2 HEMODIALYSIS PATIENT (HCC): Status: ACTIVE | Noted: 2021-01-01

## 2021-04-27 PROBLEM — D72.829 LEUKOCYTOSIS: Status: ACTIVE | Noted: 2021-01-01

## 2021-04-27 PROBLEM — I61.9 ICH (INTRACEREBRAL HEMORRHAGE) (HCC): Status: ACTIVE | Noted: 2019-07-09

## 2021-04-27 PROBLEM — K74.60 CIRRHOSIS (HCC): Status: ACTIVE | Noted: 2021-01-01

## 2021-04-28 PROBLEM — D63.1 ANEMIA DUE TO CHRONIC KIDNEY DISEASE, ON CHRONIC DIALYSIS (HCC): Status: ACTIVE | Noted: 2021-01-01

## 2021-04-28 PROBLEM — Z99.2 ANEMIA DUE TO CHRONIC KIDNEY DISEASE, ON CHRONIC DIALYSIS (HCC): Status: ACTIVE | Noted: 2021-01-01

## 2021-04-28 PROBLEM — G93.40 ENCEPHALOPATHY: Status: ACTIVE | Noted: 2021-01-01

## 2021-04-28 PROBLEM — Z66 DNR (DO NOT RESUSCITATE): Status: ACTIVE | Noted: 2021-01-01

## 2021-04-28 PROBLEM — K76.7 HEPATORENAL SYNDROME (HCC): Status: ACTIVE | Noted: 2021-01-01

## 2021-04-28 PROBLEM — N18.6 ANEMIA DUE TO CHRONIC KIDNEY DISEASE, ON CHRONIC DIALYSIS (HCC): Status: ACTIVE | Noted: 2021-01-01

## 2021-04-28 NOTE — ASSESSMENT & PLAN NOTE
Pre-existing, implemented  Care now transitioned to comfort care  IV dilaudid every hour and ativan as needed  She may require a morphine drip

## 2021-04-28 NOTE — DISCHARGE PLANNING
Care Transition Team Discharge Planning    Anticipated Discharge Disposition: TBD    Action: Per AM rounds, [place] [facility] [place]. Pt has increased confusion, and is A/O x3. Pt's blood pressure is controlled. Pt is on room air. Pt has a dialysis port in place. Pt had a nephrology consult placed overnight. Pt was transferred from [facility] to hospitalist.    Hospitalist will see if palliative can see pt and discuss the long term medical plan.        Barriers to Discharge: medical clearance/stability    Plan: Lsw will continue to follow, attend rounds for medical updates, and assist w/ d/c planning.

## 2021-04-28 NOTE — ASSESSMENT & PLAN NOTE
Recently started on HD - has a right tunneled catheter in place  Avoid nephrotoxins  Monitor electrolytes and replete as needed  Renally dose adjust medications  Strict I/O monitoring  Discussed at length with nephrology and too high-risk to perform iHD or to give hypertonic saline in this setting

## 2021-04-28 NOTE — ASSESSMENT & PLAN NOTE
CT imaging today with possible intraparenchymal hemorrhage  Neurosurgery consulted  No intervention  Now care deescalated to comfort care, hospice

## 2021-04-28 NOTE — HOSPITAL COURSE
"Chief Complaint  \"70 y.o. female alcohol abuse, alcoholic cirrhosis, hepatorenal syndrome on HD, hypothyroidism, hypertension, and prior subdural hematoma.  Who presented 4/27/2021 transferred via [facility] from [facility] for hyponatremia to Na 114, worsening AMS, and the finding of an intraparenchymal hemorrhage on head CT.  Patient is a poor historian, history therefore obtained from chart.  Per chart, patient was originally at [facility] and rehab, where she has been having issues with worsening altered mental status and frequent falls for the past few days.  Patient had blood work drawn today with revealing of a sodium of 114 which time she was brought to see [facility] for further evaluation.  While there, patient was confirmed to have a NA of 114, additionally, CT of the head was performed with 15 mm area of increased density in the right posterior parietal lobe compatible with hemorrhage.  Patient was given 100 mL hypertonic saline.  Prior to transfer, ED physician from [facility] contacted Neurosurgery (Dr. Watson) who agreed to evaluate patient on arrival to [facility].  Of note, patient currently on HD therapy for suspected hepatorenal syndrome, unknown what her normal schedule is, but believes her last HD session was 3 days prior.\"    Hospital Course  4/28 replete potassium and magnesium, nephrology - iHD too dangerous and recommends against 3% NaCl, F/U neurosurgery consult, transition to hospitalist  "

## 2021-04-28 NOTE — PROGRESS NOTES
Lab called with critical result of sodium 113 at 0840. Critical lab result read back to .   Dr. Castellanos notified in person of critical lab result at 0845.  Critical lab result read back by Dr. Castellanos.

## 2021-04-28 NOTE — ED NOTES
" Med Rec completed: per MARs from [facility]. Unable to interview patient due to Pt's status.    Preferred Pharmacy: [facility] P: [phone]      Allergies:  Allergies   Allergen Reactions   • Codeine Unspecified     \"like a heartattack\"   • Morphine Unspecified     \"like a heartattack\"       No ORAL antibiotics noted on MAR in past 14 days.        "

## 2021-04-28 NOTE — ED PROVIDER NOTES
ED Provider Note        Primary care provider: Petar Mcdonald M.D.    I verified that the patient was wearing a mask and I was wearing appropriate PPE every time I entered the room. The patient's mask was on the patient at all times during my encounter except for a brief view of the oropharynx.      CHIEF COMPLAINT  Chief Complaint   Patient presents with   • ALOC     Transfer from [facility]. Head CT shows intraparenchymal hemorrhage.        HPI  Jennifer Koroma is a 70 y.o. female who presents to the Emergency Department with chief complaint of altered mental status.  Patient was transferred here from outlying facility after she was diagnosed with profound hyponatremia with a sodium of 114 and also right occipital intraparenchymal hemorrhage.  Patient is at a care facility reportedly has had altered mental status over the last several days with increased falls.  She was taken to the outside facility after outpatient labs have revealed her sodium of 114 this was confirmed on repeat labs there she was given 100 mL of 3% saline she was then transferred to our facility for further evaluation and treatment.  Patient reports that that she has been feeling ill lately she reports that she has felt dizzy she is felt nauseous she has had no fevers no chills no chest pain no abdominal pain she denies problems with urination or bowel movements.  Further history of present illness limited by altered mental status.    REVIEW OF SYSTEMS  As per HPI otherwise limited by altered mental status    PAST MEDICAL HISTORY   has a past medical history of Cirrhosis (HCC), Hypothyroidism, Neuropathy, Renal failure, Subdural hemorrhage after traumatic injury without open intracranial wound, with prolonged loss of consciousness and return to pre-existing level of consciousness (HCC), and Thyroid disease.    SURGICAL HISTORY   has a past surgical history that includes hysterectomy laparoscopy and elsie by laparoscopy.    SOCIAL  "HISTORY  Social History     Tobacco Use   • Smoking status: Never Smoker   • Smokeless tobacco: Never Used   Substance Use Topics   • Alcohol use: Yes   • Drug use: No      Social History     Substance and Sexual Activity   Drug Use No       FAMILY HISTORY  Non-Contributory    CURRENT MEDICATIONS  Home Medications    **Home medications have not yet been reviewed for this encounter**         ALLERGIES  Allergies   Allergen Reactions   • Codeine Unspecified     \"like a heartattack\"   • Morphine Unspecified     \"like a heartattack\"       PHYSICAL EXAM  VITAL SIGNS: /84   Pulse 96   Resp 17   Ht 1.702 m (5' 7\")   Wt 101 kg (222 lb 10.6 oz)   SpO2 91%   BMI 34.87 kg/m²   Pulse ox interpretation: I interpret this pulse ox as normal.  Constitutional: Oriented to person not place or time, minimal distress  HEENT: Atraumatic normocephalic, pupils are equal round reactive to light extraocular movements are intact. The nares is clear, external ears are normal, mouth shows moist mucous membranes  Neck: Supple, no JVD no tracheal deviation  Cardiovascular: Regular rate and rhythm no murmur rub or gallop 2+ pulses peripherally x4  Thorax & Lungs: No respiratory distress, no wheezes rales or rhonchi, No chest tenderness.   GI: Soft nontender nondistended positive bowel sounds, no peritoneal signs  Skin: Warm dry no acute rash or lesion  Musculoskeletal: Moving all extremities with full range and 5 of 5 strength, no acute  deformity 2+ pitting edema in bilateral lower extremities to the mid shin.  Neurologic: Cranial nerves III through XII are grossly intact, no sensory deficit, no cerebellar dysfunction   Psychiatric: Minimal agitation otherwise appropriate      DIAGNOSTIC STUDIES / PROCEDURES  LABS      Results for orders placed or performed during the hospital encounter of 04/27/21   CBC WITH DIFFERENTIAL   Result Value Ref Range    WBC 14.2 (H) 4.8 - 10.8 K/uL    RBC 3.53 (L) 4.20 - 5.40 M/uL    Hemoglobin 11.9 (L) " 12.0 - 16.0 g/dL    Hematocrit 32.3 (L) 37.0 - 47.0 %    MCV 91.5 81.4 - 97.8 fL    MCH 33.7 (H) 27.0 - 33.0 pg    MCHC 36.8 (H) 33.6 - 35.0 g/dL    RDW 49.3 35.9 - 50.0 fL    Platelet Count 230 164 - 446 K/uL    MPV 10.2 9.0 - 12.9 fL    Neutrophils-Polys 78.50 (H) 44.00 - 72.00 %    Lymphocytes 13.90 (L) 22.00 - 41.00 %    Monocytes 5.60 0.00 - 13.40 %    Eosinophils 1.30 0.00 - 6.90 %    Basophils 0.30 0.00 - 1.80 %    Immature Granulocytes 0.40 0.00 - 0.90 %    Nucleated RBC 0.00 /100 WBC    Neutrophils (Absolute) 11.11 (H) 2.00 - 7.15 K/uL    Lymphs (Absolute) 1.97 1.00 - 4.80 K/uL    Monos (Absolute) 0.80 0.00 - 0.85 K/uL    Eos (Absolute) 0.19 0.00 - 0.51 K/uL    Baso (Absolute) 0.04 0.00 - 0.12 K/uL    Immature Granulocytes (abs) 0.06 0.00 - 0.11 K/uL    NRBC (Absolute) 0.00 K/uL   COMP METABOLIC PANEL   Result Value Ref Range    Co2 22 20 - 33 mmol/L    Glucose 89 65 - 99 mg/dL    Bun 33 (H) 8 - 22 mg/dL    Creatinine 4.46 (H) 0.50 - 1.40 mg/dL    Calcium 8.8 8.5 - 10.5 mg/dL    AST(SGOT) 85 (H) 12 - 45 U/L    ALT(SGPT) 23 2 - 50 U/L    Alkaline Phosphatase 227 (H) 30 - 99 U/L    Total Bilirubin 2.4 (H) 0.1 - 1.5 mg/dL    Albumin 3.3 3.2 - 4.9 g/dL    Total Protein 7.5 6.0 - 8.2 g/dL    Globulin 4.2 (H) 1.9 - 3.5 g/dL    A-G Ratio 0.8 g/dL   LACTIC ACID   Result Value Ref Range    Lactic Acid 1.8 0.5 - 2.0 mmol/L   proBrain Natriuretic Peptide, NT   Result Value Ref Range    NT-proBNP 1516 (H) 0 - 125 pg/mL   APTT   Result Value Ref Range    APTT 45.2 (H) 24.7 - 36.0 sec   PROTHROMBIN TIME   Result Value Ref Range    PT 21.2 (H) 12.0 - 14.6 sec    INR 1.77 (H) 0.87 - 1.13   ESTIMATED GFR   Result Value Ref Range    GFR If  12 (A) >60 mL/min/1.73 m 2    GFR If Non African American 10 (A) >60 mL/min/1.73 m 2   COV-2, FLU A/B, AND RSV BY PCR (2-4 HOURS CEPHEID): Collect NP swab in VTM    Specimen: Respirate   Result Value Ref Range    SARS-CoV-2 Source NP Swab        All labs reviewed by  me.      RADIOLOGY  CT-CTA HEAD WITH & W/O-POST PROCESS    (Results Pending)     The radiologist's interpretation of all radiological studies have been reviewed by me.    COURSE & MEDICAL DECISION MAKING  Pertinent Labs & Imaging studies reviewed. (See chart for details)    10:01 PM - Patient seen and examined at bedside.         Patient noted to have slightly elevated blood pressure likely circumstantial secondary to presenting complaint. Referred to primary care physician for further evaluation.      Critical care:  40 minutes of critical care time was spent with this patient including initial evaluation initial resuscitation the ordering of labs EKGs images and rhythm strip interpretation of such. Interventions based on such. Discussing the case with the patient the patient's family members consulting physicians. Does not include separately billable procedures.      Medical Decision Making: Patient has profound hyponatremia at 114 she has had no seizure activity she was given 100 mL of 3% saline at the outside facility.  We will repeat the sodium here.  Patient was here admitted approximately 3 months ago after similar presentation with multiple falls and was found to have a subdural and intraparenchymal hemorrhage at that time.  The lesion on the CAT scan this evening is in the same spot as previous very dim attenuation I suspect this is resolution of the previous hematoma.  I discussed case with hospitalist Dr. Avery.  I have also discussed the case with intensivist Dr. Hollingsworth.  Patient will be admitted to the ICU for ongoing management.  Patient does have SIRS criteria at this point but has no sign of sepsis there is been no focal evidence of bacterial infection amenable to antibiotics at this time she is afebrile her lactic acid is 1.8 will defer broad-spectrum antibiotics should there be evidence of an acute bacterial infection in the duration of her work-up.  Admitted in critical condition.    /84  "  Pulse 96   Resp 17   Ht 1.702 m (5' 7\")   Wt 101 kg (222 lb 10.6 oz)   SpO2 91%   BMI 34.87 kg/m²             FINAL IMPRESSION  1.  Altered mental status  2.  Intracranial, intraparenchymal hemorrhage, right posterior parietal, chronic  3.  Hyponatremia  4.  Acute on chronic renal insufficiency  5.  Pitting edema  6.  Leukocytosis  7.  Critical care 40 minutes        This dictation has been created using voice recognition software and/or scribes. The accuracy of the dictation is limited by the abilities of the software and the expertise of the scribes. I expect there may be some errors of grammar and possibly content. I made every attempt to manually correct the errors within my dictation. However, errors related to voice recognition software and/or scribes may still exist and should be interpreted within the appropriate context.            "

## 2021-04-28 NOTE — CONSULTS
PULMONARY AND CRITICAL CARE MEDICINE CONSULTATION    Date of Consultation: 2021  Requesting Physician: Adelfo Avery M.D.  Supervising Physician: Mike Hollingsworth MD  Reason for Consultation: Hyponatremia, intraparenchymal hemorrhage     Chief Complaint:     Worsening confusion and multiple falls     History of Present Illness:    70-year-old female transferred via [facility] from [facility] for hyponatremia to Na 114, worsening AMS, and the finding of an intraparenchymal hemorrhage on head CT. The patient is awake, protecting her airway, and responding to questions but altered and unable to provide any meaningful history. Per chart review, she is staying at [facility] and was sent by this facility to [facility] for increasing confusion, multiple falls, and the critical lab finding of Na 114. PMH is significant for alcohol abuse, cirrhosis, hypothyroidism, HTN, recent hospitalization from 21 to 21 for a traumatic subdural hemorrhage that was managed non-operatively, and onset of renal failure since her previous hospitalization for which the patient was started on HD. The patient denies fever, chills, headaches, nausea, vomiting, chest pain, difficulty breathing, abdominal pain, falls, oliguria, dysuria, and focal weakness. She states that she produces urine. She admits that her stomach is more distended than usual. She reports that her bilateral leg swelling is not new. She knows that she is on HD but cannot provide further details in this regard.     Medications:    Prior to Admission Medications   Prescriptions Last Dose Informant Patient Reported? Taking?   Pharmacy Consult Request Not Taking at Unknown time Patient No No   Si Each by Other route PHARMACY TO DOSE.   Patient not taking: Reported on 2021   ROPINIRole (REQUIP) 0.25 MG Tab 2021 at Unknown time  Yes Yes   Sig: Take 0.25 mg by mouth at bedtime. 0.25mg by mouth for  restless legs   acetaminophen (TYLENOL) 325 MG Tab Not Taking at Unknown time Patient No No   Sig: Take 2 Tabs by mouth every 6 hours as needed.   Patient not taking: Reported on 4/27/2021   ascorbic acid (VITAMIN C) 500 MG tablet 4/27/2021 at Unknown time  Yes Yes   Sig: Take 500 mg by mouth every day.   bisacodyl (DULCOLAX) 10 MG Suppos Not Taking at Unknown time Patient No No   Sig: Insert 1 Suppository in rectum every 24 hours as needed (if magnesium hydroxide ineffective).   Patient not taking: Reported on 4/27/2021   dexamethasone (DECADRON) 2 MG tablet Not Taking at Unknown time  No No   Sig: Take 1 Tab by mouth every 12 hours.   Patient not taking: Reported on 4/27/2021   docusate sodium 100 MG Cap Not Taking at Unknown time Patient No No   Sig: Take 100 mg by mouth 2 Times a Day.   Patient not taking: Reported on 4/27/2021   fludrocortisone (FLORINEF) 0.1 MG Tab Not Taking at Unknown time  No No   Sig: Take 2 Tabs by mouth every 12 hours.   Patient not taking: Reported on 4/27/2021   folic acid (FOLVITE) 1 MG Tab Not Taking at Unknown time  No No   Sig: Take 1 Tab by mouth every day.   Patient not taking: Reported on 4/27/2021   folic acid 1 mg-vit B complex (NEPHROCAPS) 1 MG Cap 4/27/2021 at Unknown time  Yes Yes   Sig: Take 1 capsule by mouth every day.   levothyroxine (SYNTHROID) 75 MCG Tab 4/27/2021 at Unknown time Patient No No   Sig: Take 1 Tab by mouth Every morning on an empty stomach.   lidocaine (LIDODERM) 5 % Patch Not Taking at Unknown time  No No   Sig: Place 2 Patches on the skin every 24 hours.   Patient not taking: Reported on 4/27/2021   magnesium hydroxide (MILK OF MAGNESIA) 400 MG/5ML Suspension Not Taking at Unknown time Patient No No   Sig: Take 30 mL by mouth every day.   Patient not taking: Reported on 4/27/2021   melatonin 5 mg Tab 4/23/21 at unknown  Yes Yes   Sig: Take 5 mg by mouth at bedtime as needed for Insomnia.   midodrine (PROAMATINE) 10 MG tablet 4/27/2021 at Unknown time   Yes Yes   Sig: Take 10 mg by mouth in the morning, at noon, and at bedtime. 2 out of 3 doses given on .   multivitamin (THERAGRAN) Tab Not Taking at Unknown time  No No   Sig: Take 1 Tab by mouth every day.   Patient not taking: Reported on 2021   ondansetron (ZOFRAN) 4 MG/2ML Solution injection Not Taking at Unknown time Patient No No   Si mL by Intravenous route every four hours as needed for Nausea.   Patient not taking: Reported on 2021   polyethylene glycol/lytes (MIRALAX) Pack Not Taking at Unknown time Patient No No   Sig: Take 1 Packet by mouth 2 Times a Day.   Patient not taking: Reported on 2021   sodium chloride (SALT) 1 GM Tab Not Taking at Unknown time Patient No No   Sig: Take 2 Tabs by mouth 3 times a day, with meals.   Patient not taking: Reported on 2021   thiamine (THIAMINE) 100 MG tablet Not Taking at Unknown time  No No   Sig: Take 1 Tab by mouth every day.   Patient not taking: Reported on 2021   traMADol (ULTRAM) 50 MG Tab 2021 at 0944  Yes Yes   Sig: Take 50 mg by mouth every four hours as needed for Moderate Pain.      Facility-Administered Medications: None     Current Facility-Administered Medications:   •  thiamine (B-1) IVPB 100 mg in 100 mL D5W (premix), 100 mg, Intravenous, DAILY, Mike Hollingsworth M.D.  •  folic acid (FOLVITE) tablet 1 mg, 1 mg, Oral, DAILY, Mike Hollingsworth M.D.  •  multivitamin (THERAGRAN) tablet 1 tablet, 1 tablet, Oral, DAILY, Mike Hollingsworth M.D.  •  furosemide (LASIX) injection 80 mg, 80 mg, Intravenous, Once, Mike Hollingsworth M.D.  •  levothyroxine (SYNTHROID) tablet 75 mcg, 75 mcg, Oral, AM ES, Adelfo Avery M.D.  •  midodrine (PROAMATINE) tablet 10 mg, 10 mg, Oral, TID, Adelfo Avery M.D.  •  ROPINIRole (REQUIP) tablet 0.25 mg, 0.25 mg, Oral, QHS, Jack D Ip, M.D.  •  senna-docusate (PERICOLACE or SENOKOT S) 8.6-50 MG per tablet 2 tablet, 2 tablet, Oral, BID **AND** polyethylene glycol/lytes (MIRALAX)  PACKET 1 Packet, 1 Packet, Oral, QDAY PRN **AND** magnesium hydroxide (MILK OF MAGNESIA) suspension 30 mL, 30 mL, Oral, QDAY PRN **AND** bisacodyl (DULCOLAX) suppository 10 mg, 10 mg, Rectal, QDAY PRN, Mike Hollingsworth M.D.  •  Respiratory Therapy Consult, , Nebulization, Continuous RT, Mike Hollingsworth M.D.  •  heparin injection 5,000 Units, 5,000 Units, Subcutaneous, Q8HRS, Mike Hollingsworth M.D.  •  acetaminophen (Tylenol) tablet 650 mg, 650 mg, Oral, Q6HRS PRN, Mike Hollingsworth M.D.  •  enalaprilat (VASOTEC) injection 1.25 mg, 1.25 mg, Intravenous, Q6HRS PRN, Mike Hollingsworth M.D.  •  labetalol (NORMODYNE/TRANDATE) injection 10-20 mg, 10-20 mg, Intravenous, Q4HRS PRN, Mike Hollingsworth M.D.  •  ondansetron (ZOFRAN) syringe/vial injection 4 mg, 4 mg, Intravenous, Q4HRS PRN, Mike Hollingsworth M.D.  •  ondansetron (ZOFRAN ODT) dispertab 4 mg, 4 mg, Oral, Q4HRS PRN, Mike Hollingsworth M.D.    Current Outpatient Medications:   •  melatonin 5 mg Tab, Take 5 mg by mouth at bedtime as needed for Insomnia., Disp: , Rfl:   •  midodrine (PROAMATINE) 10 MG tablet, Take 10 mg by mouth in the morning, at noon, and at bedtime. 2 out of 3 doses given on 4/27., Disp: , Rfl:   •  folic acid 1 mg-vit B complex (NEPHROCAPS) 1 MG Cap, Take 1 capsule by mouth every day., Disp: , Rfl:   •  ROPINIRole (REQUIP) 0.25 MG Tab, Take 0.25 mg by mouth at bedtime. 0.25mg by mouth for restless legs, Disp: , Rfl:   •  traMADol (ULTRAM) 50 MG Tab, Take 50 mg by mouth every four hours as needed for Moderate Pain., Disp: , Rfl:   •  ascorbic acid (VITAMIN C) 500 MG tablet, Take 500 mg by mouth every day., Disp: , Rfl:   •  fludrocortisone (FLORINEF) 0.1 MG Tab, Take 2 Tabs by mouth every 12 hours. (Patient not taking: Reported on 4/27/2021), Disp: , Rfl:   •  dexamethasone (DECADRON) 2 MG tablet, Take 1 Tab by mouth every 12 hours. (Patient not taking: Reported on 4/27/2021), Disp: , Rfl: 0  •  lidocaine  (LIDODERM) 5 % Patch, Place 2 Patches on the skin every 24 hours. (Patient not taking: Reported on 4/27/2021), Disp: , Rfl:   •  thiamine (THIAMINE) 100 MG tablet, Take 1 Tab by mouth every day. (Patient not taking: Reported on 4/27/2021), Disp:  , Rfl:   •  multivitamin (THERAGRAN) Tab, Take 1 Tab by mouth every day. (Patient not taking: Reported on 4/27/2021), Disp: , Rfl:   •  folic acid (FOLVITE) 1 MG Tab, Take 1 Tab by mouth every day. (Patient not taking: Reported on 4/27/2021), Disp: , Rfl:   •  levothyroxine (SYNTHROID) 75 MCG Tab, Take 1 Tab by mouth Every morning on an empty stomach., Disp: 30 Tab, Rfl:   •  Pharmacy Consult Request, 1 Each by Other route PHARMACY TO DOSE. (Patient not taking: Reported on 4/27/2021), Disp: , Rfl:   •  bisacodyl (DULCOLAX) 10 MG Suppos, Insert 1 Suppository in rectum every 24 hours as needed (if magnesium hydroxide ineffective). (Patient not taking: Reported on 4/27/2021), Disp: , Rfl: 0  •  ondansetron (ZOFRAN) 4 MG/2ML Solution injection, 2 mL by Intravenous route every four hours as needed for Nausea. (Patient not taking: Reported on 4/27/2021), Disp: 84 mL, Rfl:   •  magnesium hydroxide (MILK OF MAGNESIA) 400 MG/5ML Suspension, Take 30 mL by mouth every day. (Patient not taking: Reported on 4/27/2021), Disp: 1 Bottle, Rfl:   •  polyethylene glycol/lytes (MIRALAX) Pack, Take 1 Packet by mouth 2 Times a Day. (Patient not taking: Reported on 4/27/2021), Disp: , Rfl: 3  •  sodium chloride (SALT) 1 GM Tab, Take 2 Tabs by mouth 3 times a day, with meals. (Patient not taking: Reported on 4/27/2021), Disp: 90 Tab, Rfl: 3  •  acetaminophen (TYLENOL) 325 MG Tab, Take 2 Tabs by mouth every 6 hours as needed. (Patient not taking: Reported on 4/27/2021), Disp: 30 Tab, Rfl: 0  •  docusate sodium 100 MG Cap, Take 100 mg by mouth 2 Times a Day. (Patient not taking: Reported on 4/27/2021), Disp: 60 Cap, Rfl:     Allergies:    Codeine and Morphine    Past Surgical History:    Past  Surgical History:   Procedure Laterality Date   • HUSSANI BY LAPAROSCOPY     • HYSTERECTOMY LAPAROSCOPY       Past Medical History:    Past Medical History:   Diagnosis Date   • Cirrhosis (HCC)    • Hypothyroidism    • Neuropathy    • Renal failure    • Subdural hemorrhage after traumatic injury without open intracranial wound, with prolonged loss of consciousness and return to pre-existing level of consciousness (HCC)    • Thyroid disease      Social History:    Social History     Socioeconomic History   • Marital status:      Spouse name: Not on file   • Number of children: Not on file   • Years of education: Not on file   • Highest education level: Not on file   Occupational History   • Not on file   Tobacco Use   • Smoking status: Never Smoker   • Smokeless tobacco: Never Used   Substance and Sexual Activity   • Alcohol use: Yes   • Drug use: No   • Sexual activity: Not on file   Other Topics Concern   • Not on file   Social History Narrative   • Not on file     Social Determinants of Health     Financial Resource Strain:    • Difficulty of Paying Living Expenses:    Food Insecurity:    • Worried About Running Out of Food in the Last Year:    • Ran Out of Food in the Last Year:    Transportation Needs:    • Lack of Transportation (Medical):    • Lack of Transportation (Non-Medical):    Physical Activity:    • Days of Exercise per Week:    • Minutes of Exercise per Session:    Stress:    • Feeling of Stress :    Social Connections:    • Frequency of Communication with Friends and Family:    • Frequency of Social Gatherings with Friends and Family:    • Attends [employer] Services:    • Active Member of Clubs or Organizations:    • Attends Club or Organization Meetings:    • Marital Status:    Intimate Partner Violence:    • Fear of Current or Ex-Partner:    • Emotionally Abused:    • Physically Abused:    • Sexually Abused:      Family History:    History reviewed. No pertinent family history.    Review of  "Systems:    Review of Systems   Unable to perform ROS: Mental status change     Physical Examination:    /77   Pulse 98   Resp 17   Ht 1.702 m (5' 7\")   Wt 101 kg (222 lb 10.6 oz)   SpO2 95%   BMI 34.87 kg/m²      Physical Exam   Constitutional: She appears to not be writhing in pain and not jaundiced. She appears unhealthy.  Non-toxic appearance. No distress.   HENT:   Head: Normocephalic and atraumatic.   Right Ear: External ear normal.   Left Ear: External ear normal.   Nose: Nose normal.   Eyes: Pupils are equal, round, and reactive to light. No scleral icterus.   Neck: No thyromegaly present.   Cardiovascular: Regular rhythm. Tachycardia present.   No murmur heard.  Pulmonary/Chest: No respiratory distress. She has no wheezes. She exhibits no tenderness.   Right tunneled catheter in place.   Abdominal: Soft. She exhibits distension. There is hepatomegaly. There is no abdominal tenderness. There is no rebound and no guarding.   Reducible midline ventral hernia.   Musculoskeletal:         General: Edema present. No tenderness.      Cervical back: Neck supple.   Neurological: She is alert. She is not agitated. She displays normal speech.   Generalized weakness, moving all extremities, no focal neurological deficits.    Skin: Skin is warm and dry. No rash noted. She is not diaphoretic. No erythema.     Laboratory Data:    Recent Labs     04/27/21 1956 04/27/21 2216   SODIUM 114* 114*   POTASSIUM 3.9 4.0   CHLORIDE 80* 78*   CO2 22 22   BUN 35* 33*   CREATININE 4.6* 4.46*   CALCIUM 8.3* 8.8     Recent Labs     04/27/21 1956 04/27/21 2216   ALTSGPT 22 23   ASTSGOT 82* 85*   ALKPHOSPHAT 215* 227*   TBILIRUBIN 2.4* 2.4*   GLUCOSE 99 89     Recent Labs     04/27/21 1956 04/27/21 2216   RBC 3.28* 3.53*   HEMOGLOBIN 10.9* 11.9*   HEMATOCRIT 30.7* 32.3*   PLATELETCT 205 230   PROTHROMBTM  --  21.2*   APTT  --  45.2*   INR  --  1.77*     Recent Labs     04/27/21 1956 04/27/21 2216   WBC 15.6* 14.2* "   NEUTSPOLYS  --  78.50*   LYMPHOCYTES  --  13.90*   MONOCYTES  --  5.60   EOSINOPHILS  --  1.30   BASOPHILS  --  0.30   ASTSGOT 82* 85*   ALTSGPT 22 23   ALKPHOSPHAT 215* 227*   TBILIRUBIN 2.4* 2.4*     Imaging:    I personally reviewed available imaging studies, including their radiology interpretation reports, relevant to this encounter.    4/27/21 Non-contrast Head CT  15 mm area of increased density involving the right posterior parietal lobe compatible with intraparenchymal hemorrhage.    Assessment and Plan:    * Hyponatremia- (present on admission)  Assessment & Plan  Hypervolemic hyponatremia.  Na 114 at OSH, administered 100 mL of 3% saline prior to arrival.  Has a history of chronic hyponatremia with baseline Na 130-133 and cirrhosis per chart review; recently started on hemodialysis as well.     Plan:  BMP q1h, do not correct Na by more than 8 mEq in 24 hours   Free water restriction   Ross for strict ins and outs   Nephrology consulted for HD  Serum TSH and cortisol for further evaluation     ICH (intracerebral hemorrhage) (HCC)- (present on admission)  Assessment & Plan  Non-contrast head CT done today, 4/27/21, shows a 15 mm right posterior parietal lobe intraparenchymal hemorrhage.   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     Plan:  Neurosurgery consulted, appreciate recommendations   Frequent neurologic exams  Goal SBP < 160 mmHg    Cirrhosis (HCC)- (present on admission)  Assessment & Plan  Has a history of alcohol abuse; prior workup unclear.    Plan:  Monitor LFTs, aovid hepatotoxic drugs, consider further workup if outside medical records insufficient     Hepatorenal syndrome (HCC)- (present on admission)  Assessment & Plan  Recently started on HD, has a right tunneled catheter in place.   Called and spoke with the on-call Nephrologist, Dr. Teran. Informed Nephrology that the patient is on HD in [place]; advised by Nephrology to start Lasix for now.    Plan:  Nephrology  consulted for ARF and HD  Lasix 80 mg IV once, follow up on resuming HD tomorrow   Avoid nephrotoxic drugs, renally dose all medications     DNR (do not resuscitate)- (present on admission)  Assessment & Plan  Copy of POLST in patient's paper chart reviewed and indicates that she is DNR-DNI.    Contraindication to deep vein thrombosis (DVT) prophylaxis- (present on admission)  Assessment & Plan  CT head with evidence of intraparenchymal hemorrhage.     Plan:  SCDs for VTE prophylaxis     Essential hypertension- (present on admission)  Assessment & Plan  Not on BP medications outpatient.    Plan:  Maintain strict BP control with goal SBP < 160 mmHg    Hypothyroid- (present on admission)  Assessment & Plan  Chronic, on levothyroxine outpatient.     Plan:  TSH ordered  Continue home levothyroxine 75 mcg daily    High risk of deterioration, worsening vital organ dysfunction, death without above critical care interventions.

## 2021-04-28 NOTE — DISCHARGE PLANNING
Outpatient Dialysis Note    Confirmed patient is active at:    [facility]  [address] [place] [address]    Schedule: Tuesday, Thursday, Saturday   Time: 11:00am    Patient is seen by Dr. Brandt in HD clinic.    Spoke with Alyssia at facility who confirmed.    Forwarded records for review.    Kristin Hickey- Dialysis Coordinator # [phone]  [facility]

## 2021-04-28 NOTE — PROGRESS NOTES
[facility] Medicine Daily Progress Note    Date of Service  4/28/2021    Chief Complaint  70 y.o. female admitted 4/27/2021 with hyponatremia, ALOC    Hospital Course  This is a 70-year-old female with a history of alcohol abuse, alcoholic cirrhosis, hepatorenal syndrome on hemodialysis, history of hypothyroidism, hypertension, subdural hematoma brought to our facility in transfer from [facility] where the patient had apparently been found confused, the CT was showing reportedly a intraparenchymal hemorrhage.  The patient was not a reliable historian to time, has had frequent falls apparently, was residing at a rehab facility.  The patient was found to have a sodium of 114, the patient referred in transfer to the intensive care physicians here as well as Dr. Watson from neurosurgery.  The patient mated to ICU with the above concerns.    Interval Problem Update  Patient seen and examined today.    Patient tolerating treatment and therapies.  All Data, Medication data reviewed.  Case discussed with nursing as available.  Plan of Care reviewed with patient and notified of changes.  4/28 the patient reportedly is stabilized for transfer out of ICU by the intensivist, a official neurosurgical evaluation is pending, a follow-up CTA head did not show  no large vessel occlusion or aneurysmal findings, the initial CT without contrast showed a 15 mm area of increased density involving the right posterior parietal lobe.  Preliminary report from neurosurgery was that there is no intervention planned at this time.  Nephrology was called for ongoing hemodialysis and sodium correction.  It was felt that the patient at this time is too high risk for ongoing hemodialysis, sodium correction if asked physician or concentrated sodium solution.  The patient is confused, she is unable to tell me the location or year or her current condition, the patient does have a DNR/DNI status.  Palliative care and hospice has been  consulted    Consultants/Specialty  Critical care  Neurosurgery  Nephrology  Code Status  DNAR/DNI    Disposition  TBD    Review of Systems  Review of Systems   Unable to perform ROS: Medical condition        Physical Exam  Temp:  [36.4 °C (97.6 °F)] 36.4 °C (97.6 °F)  Pulse:  [75-98] 75  Resp:  [13-29] 18  BP: ()/(62-84) 105/77  SpO2:  [91 %-95 %] 94 %    Physical Exam  Vitals and nursing note reviewed.   Constitutional:       Appearance: She is well-developed. She is obese. She is ill-appearing. She is not diaphoretic.      Interventions: Nasal cannula in place.   HENT:      Head: Normocephalic and atraumatic.      Nose: Nose normal.   Eyes:      Conjunctiva/sclera: Conjunctivae normal.      Pupils: Pupils are equal, round, and reactive to light.   Neck:      Thyroid: No thyromegaly.      Vascular: No JVD.   Cardiovascular:      Rate and Rhythm: Normal rate and regular rhythm.      Heart sounds: Normal heart sounds. No friction rub. No gallop.       Comments: Right upper chest tunneled dialysis catheter  Pulmonary:      Effort: Pulmonary effort is normal.      Breath sounds: Rhonchi present. No wheezing or rales.   Abdominal:      General: Bowel sounds are normal. There is no distension.      Palpations: Abdomen is soft. There is no mass.      Tenderness: There is no abdominal tenderness. There is no guarding or rebound.   Musculoskeletal:         General: Swelling present. No tenderness. Normal range of motion.      Cervical back: Normal range of motion and neck supple.   Lymphadenopathy:      Cervical: No cervical adenopathy.   Skin:     General: Skin is warm and dry.      Coloration: Skin is pale.   Neurological:      Mental Status: She is lethargic and disoriented.      Cranial Nerves: No cranial nerve deficit.         Fluids    Intake/Output Summary (Last 24 hours) at 4/28/2021 1548  Last data filed at 4/28/2021 1300  Gross per 24 hour   Intake 134.17 ml   Output 90 ml   Net 44.17 ml        Laboratory  Recent Labs     04/27/21 1956 04/27/21 2216 04/28/21  0250   WBC 15.6* 14.2* 15.3*   RBC 3.28* 3.53* 2.86*   HEMOGLOBIN 10.9* 11.9* 9.6*   HEMATOCRIT 30.7* 32.3* 26.2*   MCV 93.6 91.5 91.6   MCH 33.2* 33.7* 33.6*   MCHC 35.5 36.8* 36.6*   RDW 15.1* 49.3 49.2   PLATELETCT 205 230 191   MPV 10.0 10.2 10.4     Recent Labs     04/27/21 1956 04/27/21 1956 04/27/21 2216 04/27/21 2216 04/28/21  0250 04/28/21  0711 04/28/21  1110   SODIUM 114*   < > 114*   < > 113* 113* 116*   POTASSIUM 3.9  --  4.0  --  3.4*  --   --    CHLORIDE 80*  --  78*  --  79*  --   --    CO2 22  --  22  --  21  --   --    GLUCOSE 99  --  89  --  87  --   --    BUN 35*  --  33*  --  30*  --   --    CREATININE 4.6*  --  4.46*  --  4.25*  --   --    CALCIUM 8.3*  --  8.8  --  8.2*  --   --     < > = values in this interval not displayed.     Recent Labs     04/27/21 2216   APTT 45.2*   INR 1.77*               Imaging  CT-CTA HEAD WITH & W/O-POST PROCESS   Final Result         1.  No large vessel occlusion or aneurysm identified.           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 Watson prior to transfer  - Dr. Watson has been made aware patient arrival, have ordered CTA head per recommendation  --CTA without obvious lesions  - Neurochecks  - strict BP control    Cirrhosis (HCC)- (present on admission)  Assessment & Plan  History of alcohol abuse and alcoholic cirrhosis  LFTs mildly elevated  -Avoid hepatotoxic medications if possible  -Monitor  Patient does not appear to be a transplant  candidate, palliative care, question hospice    Hepatorenal syndrome (HCC)- (present on admission)  Assessment & Plan  Currently on HD therapy as an outpatient  Nephrology consultation  Certainly a difficult scenario given hepatorenal syndrome    Leukocytosis- (present on admission)  Assessment & Plan  Likely reactive, no obvious evidence of infection    Anemia due to chronic kidney disease, on chronic dialysis (HCC)  Assessment & Plan  With possible intermittent blood loss    Encephalopathy  Assessment & Plan  Likely multifactorial, metabolic, history of TBI  Hyponatremia  Avoid other offending agents, maneuvers,  Slow sodium correction  Check ammonia level in a.m.  Overall poor prognosis    DNR (do not resuscitate)- (present on admission)  Assessment & Plan  Pre-existing, implemented    Contraindication to deep vein thrombosis (DVT) prophylaxis- (present on admission)  Assessment & Plan  Patient with possible intraparenchymal hemorrhage  -Hold chemical VTE    Essential hypertension- (present on admission)  Assessment & Plan  Patient with possible intraparenchymal hemorrhage noted on CT imaging  -Strict BP control with SBP less than 160  -enalaprilat and labetalol as needed    Hypothyroid- (present on admission)  Assessment & Plan  Continue with home Synthroid 75 mcg    Plan  Continue current conservative measures  Nephrology consultation appreciated  Monitor sodium levels closely  Diet trial  Palliative/hospice evaluation  See orders  Medically complex and high risk patient very poor prognosis    VTE prophylaxis: Contraindicated, SCD      I have performed a physical exam and reviewed and updated ROS and Plan today . In review of yesterday's note , there are no changes except as documented above.        Please note that this dictation was created using voice recognition software. I have made every reasonable attempt to correct obvious errors, but I expect that there are errors of grammar and possibly context that I  did not discover before finalizing the note.

## 2021-04-28 NOTE — PROGRESS NOTES
Lab called with critical result of sodium 116 at 1205. Critical lab result read back to .   Dr. Castellanos notified of critical lab result at 1207.  Critical lab result read back by Dr. Castellanos.

## 2021-04-28 NOTE — CONSULTS
PULMONARY AND CRITICAL CARE MEDICINE CONSULTATION    Date of Consultation:  4/28/2021    Requesting Physician:  Benjie Espinal MD    Consulting Physician:  Mike Hollingsworth MD    Reason for Consultation: Critical care management in lady with hyponatremia and intraparenchymal hemorrhage    Chief Complaint: Hyponatremia, intraparenchymal hemorrhage    History of Present Illness:    I was kindly asked to see and evaluate Jennifer Koroma, a 70 y.o. female for evaluation and management of the above problem.    The history is obtained from healthcare providers and the medical record as this lady cannot provide me with useful history.  This lady has a history of alcohol abuse, alcoholic cirrhosis, hepatorenal syndrome on hemodialysis, hypothyroidism, hypertension and prior subdural hematoma due to ground-level falls.  She presented to an outside hospital with confusion and increasing falls.  She was found to have hyponatremia with a sodium of 114.  CT imaging revealed evidence of an intraparenchymal hemorrhage in the right posterior parietal lobe.  She was transferred to [facility] for subspecialty care.  It is believed that her last dialysis was 3 days ago.    Medications Prior to Admission:    No current facility-administered medications on file prior to encounter.     Current Outpatient Medications on File Prior to Encounter   Medication Sig Dispense Refill   • melatonin 5 mg Tab Take 5 mg by mouth at bedtime as needed for Insomnia.     • midodrine (PROAMATINE) 10 MG tablet Take 10 mg by mouth in the morning, at noon, and at bedtime. 2 out of 3 doses given on 4/27.     • folic acid 1 mg-vit B complex (NEPHROCAPS) 1 MG Cap Take 1 capsule by mouth every day.     • ROPINIRole (REQUIP) 0.25 MG Tab Take 0.25 mg by mouth at bedtime. 0.25mg by mouth for restless legs     • traMADol (ULTRAM) 50 MG Tab Take 50 mg by mouth every four hours as needed for Moderate Pain.     • ascorbic acid (VITAMIN C) 500 MG tablet Take  500 mg by mouth every day.     • fludrocortisone (FLORINEF) 0.1 MG Tab Take 2 Tabs by mouth every 12 hours. (Patient not taking: Reported on 4/27/2021)     • dexamethasone (DECADRON) 2 MG tablet Take 1 Tab by mouth every 12 hours. (Patient not taking: Reported on 4/27/2021)  0   • lidocaine (LIDODERM) 5 % Patch Place 2 Patches on the skin every 24 hours. (Patient not taking: Reported on 4/27/2021)     • thiamine (THIAMINE) 100 MG tablet Take 1 Tab by mouth every day. (Patient not taking: Reported on 4/27/2021)     • multivitamin (THERAGRAN) Tab Take 1 Tab by mouth every day. (Patient not taking: Reported on 4/27/2021)     • folic acid (FOLVITE) 1 MG Tab Take 1 Tab by mouth every day. (Patient not taking: Reported on 4/27/2021)     • levothyroxine (SYNTHROID) 75 MCG Tab Take 1 Tab by mouth Every morning on an empty stomach. 30 Tab    • Pharmacy Consult Request 1 Each by Other route PHARMACY TO DOSE. (Patient not taking: Reported on 4/27/2021)     • bisacodyl (DULCOLAX) 10 MG Suppos Insert 1 Suppository in rectum every 24 hours as needed (if magnesium hydroxide ineffective). (Patient not taking: Reported on 4/27/2021)  0   • ondansetron (ZOFRAN) 4 MG/2ML Solution injection 2 mL by Intravenous route every four hours as needed for Nausea. (Patient not taking: Reported on 4/27/2021) 84 mL    • magnesium hydroxide (MILK OF MAGNESIA) 400 MG/5ML Suspension Take 30 mL by mouth every day. (Patient not taking: Reported on 4/27/2021) 1 Bottle    • polyethylene glycol/lytes (MIRALAX) Pack Take 1 Packet by mouth 2 Times a Day. (Patient not taking: Reported on 4/27/2021)  3   • sodium chloride (SALT) 1 GM Tab Take 2 Tabs by mouth 3 times a day, with meals. (Patient not taking: Reported on 4/27/2021) 90 Tab 3   • acetaminophen (TYLENOL) 325 MG Tab Take 2 Tabs by mouth every 6 hours as needed. (Patient not taking: Reported on 4/27/2021) 30 Tab 0   • docusate sodium 100 MG Cap Take 100 mg by mouth 2 Times a Day. (Patient not taking:  Reported on 4/27/2021) 60 Cap        Current Medications:      Current Facility-Administered Medications:   •  thiamine (B-1) IVPB 100 mg in 100 mL D5W (premix), 100 mg, Intravenous, DAILY, Mike Hollingsworth M.D.  •  folic acid (FOLVITE) tablet 1 mg, 1 mg, Oral, DAILY, Mike Hollingsworth M.D.  •  multivitamin (THERAGRAN) tablet 1 tablet, 1 tablet, Oral, DAILY, Mike Hollingsworth M.D.  •  levothyroxine (SYNTHROID) tablet 75 mcg, 75 mcg, Oral, AM ES, Adelfo Avery M.D.  •  midodrine (PROAMATINE) tablet 10 mg, 10 mg, Oral, TID, Adelfo Avery M.D.  •  ROPINIRole (REQUIP) tablet 0.25 mg, 0.25 mg, Oral, QHS, Adelfo Avery M.D.  •  senna-docusate (PERICOLACE or SENOKOT S) 8.6-50 MG per tablet 2 tablet, 2 tablet, Oral, BID **AND** polyethylene glycol/lytes (MIRALAX) PACKET 1 Packet, 1 Packet, Oral, QDAY PRN **AND** magnesium hydroxide (MILK OF MAGNESIA) suspension 30 mL, 30 mL, Oral, QDAY PRN **AND** bisacodyl (DULCOLAX) suppository 10 mg, 10 mg, Rectal, QDAY PRN, Mike Hollingsworth M.D.  •  Respiratory Therapy Consult, , Nebulization, Continuous RT, Mike Hollingsworth M.D.  •  NS infusion, , Intravenous, Continuous, Mike Hollingsworth M.D.  •  heparin injection 5,000 Units, 5,000 Units, Subcutaneous, Q8HRS, Mike Hollingsworth M.D.  •  acetaminophen (Tylenol) tablet 650 mg, 650 mg, Oral, Q6HRS PRN, Mike Hollingsworth M.D.  •  enalaprilat (VASOTEC) injection 1.25 mg, 1.25 mg, Intravenous, Q6HRS PRN, Mike Hollingsworth M.D.  •  labetalol (NORMODYNE/TRANDATE) injection 10-20 mg, 10-20 mg, Intravenous, Q4HRS PRN, Mike Hollingsworth M.D.  •  ondansetron (ZOFRAN) syringe/vial injection 4 mg, 4 mg, Intravenous, Q4HRS PRN, Mike Hollingsworth M.D.  •  ondansetron (ZOFRAN ODT) dispertab 4 mg, 4 mg, Oral, Q4HRS PRN, Mike Hollingsworth M.D.    Current Outpatient Medications:   •  melatonin 5 mg Tab, Take 5 mg by mouth at bedtime as needed for Insomnia., Disp: , Rfl:   •  midodrine (PROAMATINE) 10  MG tablet, Take 10 mg by mouth in the morning, at noon, and at bedtime. 2 out of 3 doses given on 4/27., Disp: , Rfl:   •  folic acid 1 mg-vit B complex (NEPHROCAPS) 1 MG Cap, Take 1 capsule by mouth every day., Disp: , Rfl:   •  ROPINIRole (REQUIP) 0.25 MG Tab, Take 0.25 mg by mouth at bedtime. 0.25mg by mouth for restless legs, Disp: , Rfl:   •  traMADol (ULTRAM) 50 MG Tab, Take 50 mg by mouth every four hours as needed for Moderate Pain., Disp: , Rfl:   •  ascorbic acid (VITAMIN C) 500 MG tablet, Take 500 mg by mouth every day., Disp: , Rfl:   •  fludrocortisone (FLORINEF) 0.1 MG Tab, Take 2 Tabs by mouth every 12 hours. (Patient not taking: Reported on 4/27/2021), Disp: , Rfl:   •  dexamethasone (DECADRON) 2 MG tablet, Take 1 Tab by mouth every 12 hours. (Patient not taking: Reported on 4/27/2021), Disp: , Rfl: 0  •  lidocaine (LIDODERM) 5 % Patch, Place 2 Patches on the skin every 24 hours. (Patient not taking: Reported on 4/27/2021), Disp: , Rfl:   •  thiamine (THIAMINE) 100 MG tablet, Take 1 Tab by mouth every day. (Patient not taking: Reported on 4/27/2021), Disp:  , Rfl:   •  multivitamin (THERAGRAN) Tab, Take 1 Tab by mouth every day. (Patient not taking: Reported on 4/27/2021), Disp: , Rfl:   •  folic acid (FOLVITE) 1 MG Tab, Take 1 Tab by mouth every day. (Patient not taking: Reported on 4/27/2021), Disp: , Rfl:   •  levothyroxine (SYNTHROID) 75 MCG Tab, Take 1 Tab by mouth Every morning on an empty stomach., Disp: 30 Tab, Rfl:   •  Pharmacy Consult Request, 1 Each by Other route PHARMACY TO DOSE. (Patient not taking: Reported on 4/27/2021), Disp: , Rfl:   •  bisacodyl (DULCOLAX) 10 MG Suppos, Insert 1 Suppository in rectum every 24 hours as needed (if magnesium hydroxide ineffective). (Patient not taking: Reported on 4/27/2021), Disp: , Rfl: 0  •  ondansetron (ZOFRAN) 4 MG/2ML Solution injection, 2 mL by Intravenous route every four hours as needed for Nausea. (Patient not taking: Reported on  4/27/2021), Disp: 84 mL, Rfl:   •  magnesium hydroxide (MILK OF MAGNESIA) 400 MG/5ML Suspension, Take 30 mL by mouth every day. (Patient not taking: Reported on 4/27/2021), Disp: 1 Bottle, Rfl:   •  polyethylene glycol/lytes (MIRALAX) Pack, Take 1 Packet by mouth 2 Times a Day. (Patient not taking: Reported on 4/27/2021), Disp: , Rfl: 3  •  sodium chloride (SALT) 1 GM Tab, Take 2 Tabs by mouth 3 times a day, with meals. (Patient not taking: Reported on 4/27/2021), Disp: 90 Tab, Rfl: 3  •  acetaminophen (TYLENOL) 325 MG Tab, Take 2 Tabs by mouth every 6 hours as needed. (Patient not taking: Reported on 4/27/2021), Disp: 30 Tab, Rfl: 0  •  docusate sodium 100 MG Cap, Take 100 mg by mouth 2 Times a Day. (Patient not taking: Reported on 4/27/2021), Disp: 60 Cap, Rfl:     Allergies:    Codeine and Morphine    Past Surgical History:    Past Surgical History:   Procedure Laterality Date   • HUSSAIN BY LAPAROSCOPY     • HYSTERECTOMY LAPAROSCOPY         Past Medical History:    Past Medical History:   Diagnosis Date   • Cirrhosis (HCC)    • Hypothyroidism    • Neuropathy    • Renal failure    • Subdural hemorrhage after traumatic injury without open intracranial wound, with prolonged loss of consciousness and return to pre-existing level of consciousness (HCC)    • Thyroid disease        Social History:    Social History     Socioeconomic History   • Marital status:      Spouse name: Not on file   • Number of children: Not on file   • Years of education: Not on file   • Highest education level: Not on file   Occupational History   • Not on file   Tobacco Use   • Smoking status: Never Smoker   • Smokeless tobacco: Never Used   Substance and Sexual Activity   • Alcohol use: Yes   • Drug use: No   • Sexual activity: Not on file   Other Topics Concern   • Not on file   Social History Narrative   • Not on file     Social Determinants of Health     Financial Resource Strain:    • Difficulty of Paying Living Expenses:    Food  "Insecurity:    • Worried About Running Out of Food in the Last Year:    • Ran Out of Food in the Last Year:    Transportation Needs:    • Lack of Transportation (Medical):    • Lack of Transportation (Non-Medical):    Physical Activity:    • Days of Exercise per Week:    • Minutes of Exercise per Session:    Stress:    • Feeling of Stress :    Social Connections:    • Frequency of Communication with Friends and Family:    • Frequency of Social Gatherings with Friends and Family:    • Attends [employer] Services:    • Active Member of Clubs or Organizations:    • Attends Club or Organization Meetings:    • Marital Status:    Intimate Partner Violence:    • Fear of Current or Ex-Partner:    • Emotionally Abused:    • Physically Abused:    • Sexually Abused:        Family History:    History reviewed. No pertinent family history.    Review of System:    Review of Systems   Unable to perform ROS: Acuity of condition       Physical Examination:    /77   Pulse 98   Resp 17   Ht 1.702 m (5' 7\")   Wt 101 kg (222 lb 10.6 oz)   SpO2 95%   BMI 34.87 kg/m²   Physical Exam   Constitutional:   Obese lady   HENT:   Head: Normocephalic.   Right Ear: External ear normal.   Left Ear: External ear normal.   Nose: Nose normal.   Mouth/Throat: No oropharyngeal exudate.   Eyes: Pupils are equal, round, and reactive to light. Right eye exhibits no discharge. Left eye exhibits no discharge.   Neck: No tracheal deviation present.   Cardiovascular: Intact distal pulses. Exam reveals no gallop.   No murmur heard.  Sinus rhythm   Pulmonary/Chest: No stridor. She has no wheezes. She has rales (Few crackles at her lung bases).   Abdominal:   Her abdomen is distended.  Ascites is present.  She is nontender.   Musculoskeletal:         General: Edema (3-4+ pitting edema in her lower legs and feet) present.      Cervical back: Normal range of motion and neck supple.      Comments: No clubbing or cyanosis   Neurological:   She is awake and " alert, but confused.  She has no focal weakness.   Skin: Skin is warm and dry. She is not diaphoretic.       Laboratory Data:        Recent Labs     04/27/21 1956 04/27/21 2216   WBC 15.6* 14.2*   RBC 3.28* 3.53*   HEMOGLOBIN 10.9* 11.9*   HEMATOCRIT 30.7* 32.3*   MCV 93.6 91.5   MCH 33.2* 33.7*   MCHC 35.5 36.8*   RDW 15.1* 49.3   PLATELETCT 205 230   MPV 10.0 10.2     Recent Labs     04/27/21 1956 04/27/21 2216   SODIUM 114*  --    POTASSIUM 3.9  --    CHLORIDE 80*  --    CO2 22 22   GLUCOSE 99 89   BUN 35* 33*   CREATININE 4.6* 4.46*   CALCIUM 8.3* 8.8                   Imaging:    I personally viewed all the available CXR and CT scan images as well as reviewed the radiology interpretation reports.    CT-CTA HEAD WITH & W/O-POST PROCESS    (Results Pending)       Assessment and Plan:    * Hyponatremia- (present on admission)  Assessment & Plan  Hypervolemic hyponatremia  History of cirrhosis and hepatorenal syndrome  Monitor serum every 4 hours with goal to increase sodium by no more than 8 to 10 mmol/L every 24 hours  Nephrology consultation for hemodialysis with ultrafiltration  Check serum cortisol and thyroid function    ICH (intracerebral hemorrhage) (HCC)- (present on admission)  Assessment & Plan  CT imaging with right posterior parietal lobe intraparenchymal hemorrhage  Prior MRI in January 2021 with right parietal occipital cavernoma  Neurosurgery consultation - Dr. Watson has been consulted and he will see  Neuro checks  Strict blood pressure control with goal SBP less than 160    Cirrhosis (HCC)  Assessment & Plan  History of alcohol abuse and cirrhosis  Monitor liver enzymes and synthetic function  Avoid hepatotoxins    Hepatorenal syndrome (HCC)- (present on admission)  Assessment & Plan  Nephrology consultation for hemodialysis and ultrafiltration  Renal dose medications    DNR (do not resuscitate)  Assessment & Plan  POLST which accompanies this lady indicates that she is DNR/DNI    Essential  hypertension- (present on admission)  Assessment & Plan  Strict blood pressure control with goal SBP less than 160    Hypothyroid- (present on admission)  Assessment & Plan  Continue levothyroxine      I have assessed and reassessed her blood pressure, hemodynamics, cardiovascular status, respiratory status, neurologic status, serial determinations of serum sodium.  This lady has critical hyponatremia and is at increased risk for worsening CNS system dysfunction.    High risk of deterioration and worsening vital organ dysfunction and death without the above critical care interventions.    Thank you for allowing me to participate in the care of this lady.  I will continue to follow her with great interest.    The patient is critically ill.  Critical care time = 36 minutes in directly providing and coordinating critical care and extensive data review.  No time overlap and excludes procedures.    Discussed with ER physician, hospitalist, RN, [facility] resident    Mike Hollingsworth MD  Pulmonary and Critical Care Medicine

## 2021-04-28 NOTE — ASSESSMENT & PLAN NOTE
Non-contrast head CT done today, 4/27/21, shows a 15 mm right posterior parietal lobe intraparenchymal hemorrhage.   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     Plan:  Neurosurgery consulted, appreciate recommendations   Frequent neurologic exams  Goal SBP < 160 mmHg

## 2021-04-28 NOTE — ED TRIAGE NOTES
"Chief Complaint   Patient presents with   • ALOC     Transfer from [facility]. Head CT shows intraparenchymal hemorrhage.      Pt resides at [facility]. Facility reports pt has had multiple falls over the last few days and has had increasing confusion. Labs at facility showed Na 114. [facility] head CT showed intraparenchymal hemorrhage. Pt transferred to [facility] [place] administered 100Ml 3% hypertonic saline.     /83   Pulse 94   Resp 16   Ht 1.702 m (5' 7\")   Wt 101 kg (222 lb 10.6 oz)   SpO2 92%     "

## 2021-04-28 NOTE — ASSESSMENT & PLAN NOTE
History of alcohol abuse and alcoholic cirrhosis    She is not a transplant candidate, palliative care, comfort care and hospice

## 2021-04-28 NOTE — CARE PLAN
Problem: Safety  Goal: Will remain free from injury  Outcome: PROGRESSING AS EXPECTED     Problem: Infection  Goal: Will remain free from infection  Outcome: PROGRESSING AS EXPECTED     Problem: Communication  Goal: The ability to communicate needs accurately and effectively will improve  Outcome: PROGRESSING SLOWER THAN EXPECTED     Problem: Fluid Volume:  Goal: Will maintain balanced intake and output  Outcome: PROGRESSING SLOWER THAN EXPECTED

## 2021-04-28 NOTE — PROGRESS NOTES
"Critical Care Progress Note    Date of admission  4/27/2021    Chief Complaint  \"70 y.o. female alcohol abuse, alcoholic cirrhosis, hepatorenal syndrome on HD, hypothyroidism, hypertension, and prior subdural hematoma.  Who presented 4/27/2021 transferred via [facility] from [facility] for hyponatremia to Na 114, worsening AMS, and the finding of an intraparenchymal hemorrhage on head CT.  Patient is a poor historian, history therefore obtained from chart.  Per chart, patient was originally at [facility] and rehab, where she has been having issues with worsening altered mental status and frequent falls for the past few days.  Patient had blood work drawn today with revealing of a sodium of 114 which time she was brought to [place] [facility] [place].  While there, patient was confirmed to have a NA of 114, additionally, CT of the head was performed with 15 mm area of increased density in the right posterior parietal lobe compatible with hemorrhage.  Patient was given 100 mL hypertonic saline.  Prior to transfer, ED physician from [facility] contacted Neurosurgery (Dr. Watson) who agreed to evaluate patient on arrival to [facility].  Of note, patient currently on HD therapy for suspected hepatorenal syndrome, unknown what her normal schedule is, but believes her last HD session was 3 days prior.\"    Hospital Course  4/28 replete potassium and magnesium, nephrology - iHD too dangerous and recommends against 3% NaCl, F/U neurosurgery consult, transition to hospitalist    Review of Systems  Review of Systems   Constitutional: Positive for malaise/fatigue. Negative for chills.   HENT: Negative for sore throat.    Respiratory: Negative for shortness of breath.    Cardiovascular: Positive for leg swelling. Negative for chest pain.   Gastrointestinal: Negative for abdominal pain, nausea and vomiting.   Skin: Negative for itching and rash.   Neurological: Negative for headaches.   All other systems reviewed " and are negative.       Vital Signs for last 24 hours   Temp:  [36.4 °C (97.6 °F)] 36.4 °C (97.6 °F)  Pulse:  [75-98] 75  Resp:  [13-29] 18  BP: ()/(62-84) 105/77  SpO2:  [91 %-95 %] 94 %    Hemodynamic parameters for last 24 hours       Respiratory Information for the last 24 hours       Physical Exam   Physical Exam  Vitals and nursing note reviewed. Exam conducted with a chaperone present.   Constitutional:       General: She is not in acute distress.     Appearance: She is ill-appearing. She is not toxic-appearing.   HENT:      Head: Normocephalic and atraumatic.      Right Ear: External ear normal.      Left Ear: External ear normal.      Nose: Nose normal.      Mouth/Throat:      Mouth: Mucous membranes are moist.      Pharynx: Oropharynx is clear.   Eyes:      Conjunctiva/sclera: Conjunctivae normal.      Pupils: Pupils are equal, round, and reactive to light.   Cardiovascular:      Rate and Rhythm: Normal rate and regular rhythm.      Pulses: Normal pulses.      Heart sounds: Normal heart sounds.   Pulmonary:      Effort: Pulmonary effort is normal.      Breath sounds: Normal breath sounds.   Abdominal:      Palpations: Abdomen is soft.   Musculoskeletal:         General: Normal range of motion.      Cervical back: Normal range of motion and neck supple.      Right lower leg: Edema present.      Left lower leg: Edema present.   Skin:     General: Skin is warm and dry.      Capillary Refill: Capillary refill takes 2 to 3 seconds.   Neurological:      Mental Status: She is alert and oriented to person, place, and time.         Medications  Current Facility-Administered Medications   Medication Dose Route Frequency Provider Last Rate Last Admin   • thiamine (B-1) IVPB 100 mg in 100 mL D5W (premix)  100 mg Intravenous DAILY Mike Hollingsworth M.D.   Stopped at 04/28/21 0646   • folic acid (FOLVITE) tablet 1 mg  1 mg Oral DAILY Mike Hollingsworth M.D.   1 mg at 04/28/21 0616   • multivitamin  (THERAGRAN) tablet 1 tablet  1 tablet Oral DAILY Mike Hollingsworth M.D.   1 tablet at 04/28/21 0616   • levothyroxine (SYNTHROID) tablet 75 mcg  75 mcg Oral AM ES Adelfo Avery M.D.   75 mcg at 04/28/21 0616   • ROPINIRole (REQUIP) tablet 0.25 mg  0.25 mg Oral QHS Adelfo Avery M.D.   Stopped at 04/28/21 0000   • senna-docusate (PERICOLACE or SENOKOT S) 8.6-50 MG per tablet 2 tablet  2 tablet Oral BID Mike Hollingsworth M.D.   2 tablet at 04/28/21 0616    And   • polyethylene glycol/lytes (MIRALAX) PACKET 1 Packet  1 Packet Oral QDAY PRN Mike Hollingsworth M.D.        And   • magnesium hydroxide (MILK OF MAGNESIA) suspension 30 mL  30 mL Oral QDAY PRN Mike Hollingsworth M.D.        And   • bisacodyl (DULCOLAX) suppository 10 mg  10 mg Rectal QDAY PRN Mike Hollingsworth M.D.       • Respiratory Therapy Consult   Nebulization Continuous RT Mike Hollingsworth M.D.       • acetaminophen (Tylenol) tablet 650 mg  650 mg Oral Q6HRS PRN Mike Hollingsworth M.D.       • enalaprilat (VASOTEC) injection 1.25 mg  1.25 mg Intravenous Q6HRS PRN Mike Hollingsworth M.D.       • labetalol (NORMODYNE/TRANDATE) injection 10-20 mg  10-20 mg Intravenous Q4HRS PRN Mike Hollingsworth M.D.       • ondansetron (ZOFRAN) syringe/vial injection 4 mg  4 mg Intravenous Q4HRS PRN Mike Hollingsworth M.D.       • ondansetron (ZOFRAN ODT) dispertab 4 mg  4 mg Oral Q4HRS PRN Mike Hollingsworth M.D.           Fluids    Intake/Output Summary (Last 24 hours) at 4/28/2021 1306  Last data filed at 4/28/2021 1300  Gross per 24 hour   Intake 134.17 ml   Output 90 ml   Net 44.17 ml       Laboratory          Recent Labs     04/27/21 1956 04/27/21 1956 04/27/21 2216 04/27/21 2216 04/28/21  0250 04/28/21  0711 04/28/21  1110   SODIUM 114*   < > 114*   < > 113* 113* 116*   POTASSIUM 3.9  --  4.0  --  3.4*  --   --    CHLORIDE 80*  --  78*  --  79*  --   --    CO2 22  --  22  --  21  --   --    BUN 35*  --  33*  --  30*   --   --    CREATININE 4.6*  --  4.46*  --  4.25*  --   --    MAGNESIUM  --   --   --   --  1.6  --   --    PHOSPHORUS  --   --   --   --  3.6  --   --    CALCIUM 8.3*  --  8.8  --  8.2*  --   --     < > = values in this interval not displayed.     Recent Labs     04/27/21 1956 04/27/21 2216 04/28/21  0250   ALTSGPT 22 23 19   ASTSGOT 82* 85* 69*   ALKPHOSPHAT 215* 227* 180*   TBILIRUBIN 2.4* 2.4* 2.2*   GLUCOSE 99 89 87     Recent Labs     04/27/21 1956 04/27/21 2216 04/28/21  0250   WBC 15.6* 14.2* 15.3*   NEUTSPOLYS  --  78.50* 77.90*   LYMPHOCYTES  --  13.90* 12.70*   MONOCYTES  --  5.60 7.20   EOSINOPHILS  --  1.30 1.40   BASOPHILS  --  0.30 0.30   ASTSGOT 82* 85* 69*   ALTSGPT 22 23 19   ALKPHOSPHAT 215* 227* 180*   TBILIRUBIN 2.4* 2.4* 2.2*     Recent Labs     04/27/21 1956 04/27/21 2216 04/28/21  0250   RBC 3.28* 3.53* 2.86*   HEMOGLOBIN 10.9* 11.9* 9.6*   HEMATOCRIT 30.7* 32.3* 26.2*   PLATELETCT 205 230 191   PROTHROMBTM  --  21.2*  --    APTT  --  45.2*  --    INR  --  1.77*  --        Imaging  CT:    Reviewed    Assessment/Plan  * Hyponatremia- (present on admission)  Assessment & Plan  Hypervolemic hyponatremia  Na 114 at OSH, administered 100 mL of 3% saline prior to arrival  History of chronic hyponatremia with baseline Na 130-133 and cirrhosis per chart review (recently started on hemodialysis)    BMP Q 6 hr  Free water restriction  Discussed at length with nephrology and too high-risk to perform iHD or to give hypertonic saline in this setting    ICH (intracerebral hemorrhage) (HCC)- (present on admission)  Assessment & Plan  Non-contrast head CT done today, 4/27/21, shows a 15 mm right posterior parietal lobe intraparenchymal hemorrhage.   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     Plan:  Neurosurgery consulted, appreciate recommendations   Frequent neurologic exams  Goal SBP < 160 mmHg    Cirrhosis (HCC)- (present on admission)  Assessment & Plan  Has a history of alcohol  abuse    Monitor LFTs  Aovid hepatotoxic drugs  Watch for EtOH withdrawal    Hepatorenal syndrome (HCC)- (present on admission)  Assessment & Plan  Recently started on HD - has a right tunneled catheter in place  Avoid nephrotoxins  Monitor electrolytes and replete as needed  Renally dose adjust medications  Strict I/O monitoring  Discussed at length with nephrology and too high-risk to perform iHD or to give hypertonic saline in this setting    DNR (do not resuscitate)- (present on admission)  Assessment & Plan  Copy of POLST in patient's paper chart reviewed and indicates that she is DNAR-DNI    Contraindication to deep vein thrombosis (DVT) prophylaxis- (present on admission)  Assessment & Plan  CT head with evidence of intraparenchymal hemorrhage.   SCDs for VTE prophylaxis     Essential hypertension- (present on admission)  Assessment & Plan  Not on BP medications outpatient  Maintain strict BP control with goal SBP < 160 mmHg    Hypothyroid- (present on admission)  Assessment & Plan  Chronic, on levothyroxine outpatient  Continue home levothyroxine 75 mcg daily     DVT prophylaxis: No chemical DVT prophylaxis 2/2 intracranial lesion  PUD prophylaxis: Not indicated  Glycemic control: Sliding scale insulin  Nutrition: NPO  Lines: Dual lumen HD catheter  Ross: Need for strict I/O monitoring, remove when able  Mobility: Early mobility as tolerated  Goals of care: DNAR/DNI  Disposition: ICU --> transfer to hospitalist    I have performed a physical exam and reviewed and updated ROS and Plan today (4/28/2021). In review of yesterday's note (4/27/2021), there are no changes except as documented above.     Discussed patient condition and risk of morbidity and/or mortality with Hospitalist, RN, RT, Pharmacy, Patient and nephrology

## 2021-04-28 NOTE — ASSESSMENT & PLAN NOTE
Sodium 114 on presentation  Possibly hypervolemic hyponatremia  History of cirrhosis and hepatorenal syndrome

## 2021-04-28 NOTE — PROGRESS NOTES
Lab called with critical result of Sodium of 113 at 0406. Critical lab result read back.   Dr. Hollingsworth notified of critical lab result at 0408.  Critical lab result read back by Dr. Hollingsworth.

## 2021-04-28 NOTE — PROGRESS NOTES
2 RN skin check complete with ARNAUD Abraham.   Devices in place peripheral IV in L arm.    Skin assessed under devices blood pressure cuff and electrodes.    Confirmed pressure ulcers found on sacrum, scabs to R forearm and L leg.    The following interventions in place Allevyn applied to sacrum, Q2 hour turns, photos linked to LDAs.

## 2021-04-28 NOTE — H&P
Hospital Medicine History & Physical Note    Date of Service  4/27/2021    Primary Care Physician  Petar Mcdonald M.D.    Consultants  ICU  Neurosurgery    Code Status  DNAR/DNI    Chief Complaint  Chief Complaint   Patient presents with   • ALOC     Transfer from [facility]. Head CT shows intraparenchymal hemorrhage.        History of Presenting Illness  70 y.o. female alcohol abuse, alcoholic cirrhosis, hepatorenal syndrome on HD, hypothyroidism, hypertension, and prior subdural hematoma.  Who presented 4/27/2021 transferred via [facility] from [facility] for hyponatremia to Na 114, worsening AMS, and the finding of an intraparenchymal hemorrhage on head CT.  Patient is a poor historian, history therefore obtained from chart.  Per chart, patient was originally at [facility] and rehab, where she has been having issues with worsening altered mental status and frequent falls for the past few days.  Patient had blood work drawn today with revealing of a sodium of 114 which time she was brought to see [facility] [place].  While there, patient was confirmed to have a NA of 114, additionally, CT of the head was performed with 15 mm area of increased density in the right posterior parietal lobe compatible with hemorrhage.  Patient was given 100 mL hypertonic saline.  Prior to transfer, ED physician from [facility] contacted Neurosurgery (Dr. Watson) who agreed to evaluate patient on arrival to [facility].  Of note, patient currently on HD therapy for suspected hepatorenal syndrome, unknown what her normal schedule is, but believes her last HD session was 3 days prior.        Review of Systems  Review of Systems   Unable to perform ROS: Mental acuity       Past Medical History   has a past medical history of Cirrhosis (HCC), Hypothyroidism, Neuropathy, Renal failure, Subdural hemorrhage after traumatic injury without open intracranial wound, with prolonged loss of consciousness and return to  "pre-existing level of consciousness (HCC), and Thyroid disease.    Surgical History   has a past surgical history that includes hysterectomy laparoscopy and elsie by laparoscopy.     Family History  No pertinent family history to hyponatremia    Social History   reports that she has never smoked. She has never used smokeless tobacco. She reports current alcohol use. She reports that she does not use drugs.    Allergies  Allergies   Allergen Reactions   • Codeine Unspecified     \"like a heartattack\"   • Morphine Unspecified     \"like a heartattack\"       Medications  Prior to Admission Medications   Prescriptions Last Dose Informant Patient Reported? Taking?   Pharmacy Consult Request Not Taking at Unknown time Patient No No   Si Each by Other route PHARMACY TO DOSE.   Patient not taking: Reported on 2021   ROPINIRole (REQUIP) 0.25 MG Tab 2021 at Unknown time  Yes Yes   Sig: Take 0.25 mg by mouth at bedtime. 0.25mg by mouth for restless legs   acetaminophen (TYLENOL) 325 MG Tab Not Taking at Unknown time Patient No No   Sig: Take 2 Tabs by mouth every 6 hours as needed.   Patient not taking: Reported on 2021   ascorbic acid (VITAMIN C) 500 MG tablet 2021 at Unknown time  Yes Yes   Sig: Take 500 mg by mouth every day.   bisacodyl (DULCOLAX) 10 MG Suppos Not Taking at Unknown time Patient No No   Sig: Insert 1 Suppository in rectum every 24 hours as needed (if magnesium hydroxide ineffective).   Patient not taking: Reported on 2021   dexamethasone (DECADRON) 2 MG tablet Not Taking at Unknown time  No No   Sig: Take 1 Tab by mouth every 12 hours.   Patient not taking: Reported on 2021   docusate sodium 100 MG Cap Not Taking at Unknown time Patient No No   Sig: Take 100 mg by mouth 2 Times a Day.   Patient not taking: Reported on 2021   fludrocortisone (FLORINEF) 0.1 MG Tab Not Taking at Unknown time  No No   Sig: Take 2 Tabs by mouth every 12 hours.   Patient not taking: Reported " on 2021   folic acid (FOLVITE) 1 MG Tab Not Taking at Unknown time  No No   Sig: Take 1 Tab by mouth every day.   Patient not taking: Reported on 2021   folic acid 1 mg-vit B complex (NEPHROCAPS) 1 MG Cap 2021 at Unknown time  Yes Yes   Sig: Take 1 capsule by mouth every day.   levothyroxine (SYNTHROID) 75 MCG Tab 2021 at Unknown time Patient No No   Sig: Take 1 Tab by mouth Every morning on an empty stomach.   lidocaine (LIDODERM) 5 % Patch Not Taking at Unknown time  No No   Sig: Place 2 Patches on the skin every 24 hours.   Patient not taking: Reported on 2021   magnesium hydroxide (MILK OF MAGNESIA) 400 MG/5ML Suspension Not Taking at Unknown time Patient No No   Sig: Take 30 mL by mouth every day.   Patient not taking: Reported on 2021   melatonin 5 mg Tab 21 at unknown  Yes Yes   Sig: Take 5 mg by mouth at bedtime as needed for Insomnia.   midodrine (PROAMATINE) 10 MG tablet 2021 at Unknown time  Yes Yes   Sig: Take 10 mg by mouth in the morning, at noon, and at bedtime. 2 out of 3 doses given on .   multivitamin (THERAGRAN) Tab Not Taking at Unknown time  No No   Sig: Take 1 Tab by mouth every day.   Patient not taking: Reported on 2021   ondansetron (ZOFRAN) 4 MG/2ML Solution injection Not Taking at Unknown time Patient No No   Si mL by Intravenous route every four hours as needed for Nausea.   Patient not taking: Reported on 2021   polyethylene glycol/lytes (MIRALAX) Pack Not Taking at Unknown time Patient No No   Sig: Take 1 Packet by mouth 2 Times a Day.   Patient not taking: Reported on 2021   sodium chloride (SALT) 1 GM Tab Not Taking at Unknown time Patient No No   Sig: Take 2 Tabs by mouth 3 times a day, with meals.   Patient not taking: Reported on 2021   thiamine (THIAMINE) 100 MG tablet Not Taking at Unknown time  No No   Sig: Take 1 Tab by mouth every day.   Patient not taking: Reported on 2021   traMADol (ULTRAM) 50 MG Tab  4/24/2021 at 0944  Yes Yes   Sig: Take 50 mg by mouth every four hours as needed for Moderate Pain.      Facility-Administered Medications: None       Physical Exam  Temp:  [36.7 °C (98 °F)] 36.7 °C (98 °F)  Pulse:  [93-98] 98  Resp:  [16-18] 17  BP: (116-143)/(76-92) 116/77  SpO2:  [92 %-95 %] 95 %    Physical Exam  Vitals and nursing note reviewed.   Constitutional:       General: She is not in acute distress.     Appearance: She is ill-appearing.   HENT:      Mouth/Throat:      Mouth: Mucous membranes are moist.   Eyes:      Extraocular Movements: Extraocular movements intact.   Cardiovascular:      Rate and Rhythm: Regular rhythm. Tachycardia present.      Pulses: Normal pulses.      Heart sounds: Normal heart sounds. No murmur. No gallop.       Comments: HD port right upper chest  Pulmonary:      Effort: Pulmonary effort is normal. No respiratory distress.      Breath sounds: No wheezing, rhonchi or rales.   Abdominal:      General: Abdomen is flat. Bowel sounds are normal. There is distension.      Palpations: Abdomen is soft.      Tenderness: There is no abdominal tenderness.      Hernia: A hernia (Reducible) is present.   Musculoskeletal:         General: No deformity. Normal range of motion.      Right lower leg: Edema present.      Left lower leg: Edema present.   Skin:     General: Skin is warm and dry.      Coloration: Skin is not jaundiced.      Findings: No rash.   Neurological:      General: No focal deficit present.      Mental Status: She is alert.      Motor: Weakness (Diffuse weakness) present.      Comments: AAO x2   Psychiatric:         Mood and Affect: Mood normal.         Laboratory:  Recent Labs     04/27/21 1956 04/27/21 2216   WBC 15.6* 14.2*   RBC 3.28* 3.53*   HEMOGLOBIN 10.9* 11.9*   HEMATOCRIT 30.7* 32.3*   MCV 93.6 91.5   MCH 33.2* 33.7*   MCHC 35.5 36.8*   RDW 15.1* 49.3   PLATELETCT 205 230   MPV 10.0 10.2     Recent Labs     04/27/21 1956 04/27/21 2216   SODIUM 114*  --     POTASSIUM 3.9  --    CHLORIDE 80*  --    CO2 22 22   GLUCOSE 99 89   BUN 35* 33*   CREATININE 4.6* 4.46*   CALCIUM 8.3* 8.8     Recent Labs     04/27/21 1956 04/27/21 2216   ALTSGPT 22 23   ASTSGOT 82* 85*   ALKPHOSPHAT 215* 227*   TBILIRUBIN 2.4* 2.4*   GLUCOSE 99 89     Recent Labs     04/27/21 2216   APTT 45.2*   INR 1.77*     Recent Labs     04/27/21 2216   NTPROBNP 1516*         No results for input(s): TROPONINT in the last 72 hours.    Imaging:  CT-CTA HEAD WITH & W/O-POST PROCESS    (Results Pending)         Assessment/Plan:  I anticipate this patient will require at least two midnights for appropriate medical management, necessitating inpatient admission.    * Hyponatremia- (present on admission)  Assessment & Plan  Sodium 114  Possibly hypervolemic hyponatremia  History of cirrhosis and hepatorenal syndrome  S/p 100 cc hypertonic saline  - Holding IV fluids for now  - f/u with repeat BMP q4h   - Correction goal 6-8 in 24hrs  - F/u with Serum Osm, Urine Osm, Urine Na  - f/u with serum cortisol and thyroid panel  - Nephrology consult in AM for possible ultrafiltration        ICH (intracerebral hemorrhage) (HCC)- (present on admission)  Assessment & Plan  CT imaging today with possible intraparenchymal hemorrhage  However, MRI January 2021 with right parietal occipital cavernoma  - Outside ED physician discussed case with Dr. Watson prior to transfer  - Dr. Watson has been made aware patient arrival, have ordered CTA head per recommendation  ---We will evaluate in the morning  - Neurochecks  - strict BP control    Cirrhosis (HCC)- (present on admission)  Assessment & Plan  History of alcohol abuse and alcoholic cirrhosis  LFTs mildly elevated  -Avoid hepatotoxic medications if possible  -Monitor    Hepatorenal syndrome (HCC)- (present on admission)  Assessment & Plan  Currently on HD therapy  Unknown when his schedule is  Possibly last HD therapy 3 days prior  Unknown who patient's nephrologist is  -We  will need nephrology consult in the morning for initiation of HD and possible ultrafiltration    Contraindication to deep vein thrombosis (DVT) prophylaxis- (present on admission)  Assessment & Plan  Patient with possible intraparenchymal hemorrhage  -Hold chemical VTE    Essential hypertension- (present on admission)  Assessment & Plan  Patient with possible intraparenchymal hemorrhage noted on CT imaging  -Strict BP control with SBP less than 160  -Have ordered enalaprilat and labetalol as needed    Hypothyroid- (present on admission)  Assessment & Plan  Continue with home Synthroid 75 mcg    VTE: SCD, hold chemical VTE

## 2021-04-28 NOTE — ED NOTES
Patient resting in bed. No acute distress.     No complaints at this time.     Fall and safety precautions maintained.     Hourly rounding in progress.

## 2021-04-28 NOTE — ASSESSMENT & PLAN NOTE
Hypervolemic hyponatremia  Na 114 at OSH, administered 100 mL of 3% saline prior to arrival  History of chronic hyponatremia with baseline Na 130-133 and cirrhosis per chart review (recently started on hemodialysis)    BMP Q 6 hr  Free water restriction  Discussed at length with nephrology and too high-risk to perform iHD or to give hypertonic saline in this setting

## 2021-04-28 NOTE — DISCHARGE PLANNING
Care Transition Team Discharge Planning    Anticipated Discharge Disposition: TBD    Action: Lsw spoke to RN Nigel w/ [facility]. He received an order for information to see pt at bedside.    LSw noted there is also a palliative order. Nigel indicates palliative may see her tomorrow. Nigel will see her now, and determine what she has been advised in past from her medical M[facility] in [facility].    Barriers to Discharge: TBd    Plan: Lsw will continue to follow, and assist w/ d/c planning.

## 2021-04-28 NOTE — ASSESSMENT & PLAN NOTE
Has a history of alcohol abuse    Monitor LFTs  Aovid hepatotoxic drugs  Watch for EtOH withdrawal

## 2021-04-29 NOTE — DISCHARGE PLANNING
Care Transition Team Discharge Planning    Anticipated Discharge Disposition: d/c back to SNF on hospice    Action: Lsw spoke to RN Nigel w/ [facility] admissions. He indicates pt lives at an [facility] in [place]. The family wants pt to d/c back w/ Hospice. [facility] does not cover that area.    Lsw called pt's son, but VM has not been set up.    Lsw called pt's dtr, Brigida. She lives locally and says her dtr and brother will fly in from other states today. They hope to convince pt to d/c back to SNF [facility] w/ hospice. Dtr received scanned and emailed copy of hospice agencies in [place] and [facility].    Brigida reports the family hopes to be here tonight to discuss w/ pt option of going back to SNF w/ hospice.      Later, hospice RN indicated pt said she wanted comfort.     Barriers to Discharge: hospice choice who covers [facility],     Plan: Lsw will continue to follow and assist w/ d/c planning.

## 2021-04-29 NOTE — PROGRESS NOTES
[facility] Medicine Daily Progress Note    Date of Service  4/29/2021    Chief Complaint  70 y.o. female admitted 4/27/2021 with hyponatremia, ALOC    Hospital Course  This is a 70-year-old female with a history of alcohol abuse, alcoholic cirrhosis, hepatorenal syndrome on hemodialysis, history of hypothyroidism, hypertension, subdural hematoma brought to our facility in transfer from [facility] where the patient had apparently been found confused, the CT was showing reportedly a intraparenchymal hemorrhage.  The patient was not a reliable historian to time, has had frequent falls apparently, was residing at a rehab facility.  The patient was found to have a sodium of 114, the patient referred in transfer to the intensive care physicians here as well as Dr. Watson from neurosurgery.  The patient mated to ICU with the above concerns.    Interval Problem Update  Patient seen and examined today.    Patient tolerating treatment and therapies.  All Data, Medication data reviewed.  Case discussed with nursing as available.  Plan of Care reviewed with patient and notified of changes.  4/28 the patient reportedly is stabilized for transfer out of ICU by the intensivist, a official neurosurgical evaluation is pending, a follow-up CTA head did not show  no large vessel occlusion or aneurysmal findings, the initial CT without contrast showed a 15 mm area of increased density involving the right posterior parietal lobe.  Preliminary report from neurosurgery was that there is no intervention planned at this time.  Nephrology was called for ongoing hemodialysis and sodium correction.  It was felt that the patient at this time is too high risk for ongoing hemodialysis, sodium correction if asked physician or concentrated sodium solution.  The patient is confused, she is unable to tell me the location or year or her current condition, the patient does have a DNR/DNI status.  Palliative care and hospice has been  consulted  4/29 the patient with fairly unchanged status, confused, lethargic, denies current pain, laboratory data with slight improvement, slightly higher sodium level, persistent renal dysfunction, hospice met with patient and conversation with family, proceed with comfort care/hospice, family to arrive later on today to spend time with patient  Consultants/Specialty  Critical care  Neurosurgery  Nephrology  Code Status  DNAR/DNI    Disposition  TBD, hospice transfer to previous living facility in [place]    Review of Systems  Review of Systems   Unable to perform ROS: Medical condition        Physical Exam  Temp:  [36.3 °C (97.3 °F)-36.7 °C (98.1 °F)] 36.3 °C (97.3 °F)  Pulse:  [85-95] 93  Resp:  [18] 18  BP: (115-126)/(74-86) 126/74  SpO2:  [91 %-95 %] 94 %    Physical Exam  Vitals and nursing note reviewed.   Constitutional:       Appearance: She is well-developed. She is obese. She is ill-appearing. She is not diaphoretic.      Interventions: Nasal cannula in place.   HENT:      Head: Normocephalic and atraumatic.      Nose: Nose normal.   Eyes:      Conjunctiva/sclera: Conjunctivae normal.      Pupils: Pupils are equal, round, and reactive to light.   Neck:      Thyroid: No thyromegaly.      Vascular: No JVD.   Cardiovascular:      Rate and Rhythm: Normal rate and regular rhythm.      Heart sounds: Normal heart sounds. No friction rub. No gallop.       Comments: Right upper chest tunneled dialysis catheter  Pulmonary:      Effort: Pulmonary effort is normal.      Breath sounds: Rhonchi present. No wheezing or rales.   Abdominal:      General: Bowel sounds are normal. There is no distension.      Palpations: Abdomen is soft. There is no mass.      Tenderness: There is no abdominal tenderness. There is no guarding or rebound.   Musculoskeletal:         General: Swelling present. No tenderness. Normal range of motion.      Cervical back: Normal range of motion and neck supple.   Lymphadenopathy:       Cervical: No cervical adenopathy.   Skin:     General: Skin is warm and dry.      Coloration: Skin is pale.   Neurological:      Mental Status: She is lethargic and disoriented.      Cranial Nerves: No cranial nerve deficit.         Fluids    Intake/Output Summary (Last 24 hours) at 4/29/2021 1455  Last data filed at 4/29/2021 1000  Gross per 24 hour   Intake 300 ml   Output --   Net 300 ml       Laboratory  Recent Labs     04/27/21 2216 04/28/21 0250 04/29/21 0339   WBC 14.2* 15.3* 11.9*   RBC 3.53* 2.86* 2.96*   HEMOGLOBIN 11.9* 9.6* 9.8*   HEMATOCRIT 32.3* 26.2* 27.0*   MCV 91.5 91.6 91.2   MCH 33.7* 33.6* 33.1*   MCHC 36.8* 36.6* 36.3*   RDW 49.3 49.2 49.3   PLATELETCT 230 191 216   MPV 10.2 10.4 10.4     Recent Labs     04/27/21 2216 04/27/21 2216 04/28/21 0250 04/28/21  0250 04/28/21  0711 04/28/21  1110 04/29/21 0339   SODIUM 114*   < > 113*   < > 113* 116* 116*   POTASSIUM 4.0  --  3.4*  --   --   --  3.6   CHLORIDE 78*  --  79*  --   --   --  80*   CO2 22  --  21  --   --   --  23   GLUCOSE 89  --  87  --   --   --  86   BUN 33*  --  30*  --   --   --  37*   CREATININE 4.46*  --  4.25*  --   --   --  4.29*   CALCIUM 8.8  --  8.2*  --   --   --  8.2*    < > = values in this interval not displayed.     Recent Labs     04/27/21 2216 04/29/21 0339   APTT 45.2*  --    INR 1.77* 1.75*               Imaging  CT-CTA HEAD WITH & W/O-POST PROCESS   Final Result         1.  No large vessel occlusion or aneurysm identified.           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 Watson prior to transfer  - Dr. Watson has been made aware patient arrival, have ordered CTA head per recommendation  --CTA without obvious lesions  - Neurochecks  - strict BP control    Cirrhosis (HCC)- (present on admission)  Assessment & Plan  History of alcohol abuse and alcoholic cirrhosis  LFTs mildly elevated  -Avoid hepatotoxic medications if possible  -Monitor  Patient does not appear to be a transplant candidate, palliative care, question hospice    Hepatorenal syndrome (HCC)- (present on admission)  Assessment & Plan  Currently on HD therapy as an outpatient  Nephrology consultation  Certainly a difficult scenario given hepatorenal syndrome    Leukocytosis- (present on admission)  Assessment & Plan  Likely reactive, no obvious evidence of infection    Anemia due to chronic kidney disease, on chronic dialysis (HCC)  Assessment & Plan  With possible intermittent blood loss    Encephalopathy  Assessment & Plan  Likely multifactorial, metabolic, history of TBI  Hyponatremia  Avoid other offending agents, maneuvers,  Slow sodium correction  Check ammonia level in a.m.  Overall poor prognosis    DNR (do not resuscitate)- (present on admission)  Assessment & Plan  Pre-existing, implemented    Contraindication to deep vein thrombosis (DVT) prophylaxis- (present on admission)  Assessment & Plan  Patient with possible intraparenchymal hemorrhage  -Hold chemical VTE    Essential hypertension- (present on admission)  Assessment & Plan  Patient with possible intraparenchymal hemorrhage noted on CT imaging  -Strict BP control with SBP less than 160  -enalaprilat and labetalol as needed    Hypothyroid- (present on admission)  Assessment & Plan  Continue with home Synthroid 75 mcg    Plan  Patient to change to comfort care and hospice transfer to a previous location in [place] as per patient's and family's wishes  Continue current conservative measures  Nephrology consultation appreciated  Diet, as  tolerated  Palliative/hospice evaluation appreciated  See orders  Medically complex and high risk patient very poor prognosis, end-of-life decision-making    VTE prophylaxis: Contraindicated, SCD      I have performed a physical exam and reviewed and updated ROS and Plan today . In review of yesterday's note , there are no changes except as documented above.        Please note that this dictation was created using voice recognition software. I have made every reasonable attempt to correct obvious errors, but I expect that there are errors of grammar and possibly context that I did not discover before finalizing the note.

## 2021-04-29 NOTE — DISCHARGE PLANNING
Care Transition Team Discharge Planning    Anticipated Discharge Disposition: d/c back to [facility] w/ a hospice agency who covers that area    Action: Lsw spoke to hospice RN MD Dr Vickey Manning, then pt's dtr.    Dtr, son, and gdtr will all come to bedside today once son and gdtr arrive from out of state. They will discuss w/ pt a d/c back to SNF on hospice.    Lsw scanned and emailed the hospice choice list for the [facility] to pt's dtr. She will choose a provider for d/c.    Barriers to Discharge: TBd     Plan:  Lsw will continue to follow, and assist w/ d/c planning.

## 2021-04-29 NOTE — CARE PLAN
Problem: Safety  Goal: Will remain free from injury  Outcome: PROGRESSING AS EXPECTED  Fall precautions in place. Bed in lowest position. Non-skid socks in place. Personal possessions within reach. Mobility sign on door. Bed-alarm on. Call light within reach. Pt educated regarding fall prevention and states understanding.       Problem: Knowledge Deficit  Goal: Knowledge of disease process/condition, treatment plan, diagnostic tests, and medications will improve  Outcome: PROGRESSING SLOWER THAN EXPECTED  Pt educated regarding plan of care and medications. All questions answered.

## 2021-04-29 NOTE — CONSULTS
DATE OF SERVICE:  04/28/2021     NEPHROLOGY CONSULTATION     Consult at the request of Dr. Dayton Castellanos.     REASON FOR CONSULTATION:  Evaluate patient with acute kidney injury, on   hemodialysis, alcoholic cirrhosis, hypertension, hepatorenal syndrome.     HISTORY OF PRESENT ILLNESS:  The patient is a 70-year-old female with known   alcohol abuse, alcoholic liver cirrhosis, recently hospitalized with   hepatorenal syndrome, started on hemodialysis, not a transplant candidate,   admitted to the hospital on 04/27/2021 with severe hyponatremia with sodium of   114, worsening altered mental status, also found intraparenchymal hemorrhage   on head CT.  The patient has been dialyzing on Tuesday, Thursday and Saturday   in [facility] dialysis unit.  Nephrologist is Dr. Shirin Brandt.    Currently, patient is still confused; however, following commands, answering   simple questions.  Her sodium improved to 116, potassium 3.4.  Her kidney   function remains stable.  Upon admission, creatinine 4.6, currently at 4.25.     REVIEW OF SYSTEMS:  Not possible to obtain as the patient is obtunded.     PAST MEDICAL HISTORY:  Liver cirrhosis, hypothyroidism, neuropathy, acute   kidney injury, hepatorenal syndrome, on dialysis, subdural hemorrhage after a   traumatic injury, thyroid disease.     PAST SURGICAL HISTORY:  Tunneled dialysis catheter placement, hysterectomy,   and cholecystectomy.     FAMILY HISTORY:  Not able to obtain as the patient is obtunded.     SOCIAL HISTORY:  No tobacco, no drugs.  Positive for alcohol abuse.     ALLERGIES:  ALLERGIC TO CODEINE, MORPHINE.     OUTPATIENT MEDICATIONS:  Reviewed.     PHYSICAL EXAMINATION:    VITAL SIGNS:  Blood pressure 107/68, heart rate 82, temperature 36.4 Celsius.  GENERAL APPEARANCE:  Well-developed, well-nourished female in no acute   distress, confused.  HEENT:  Normocephalic, atraumatic.  Pupils equal, round, reactive to light.    Extraocular movement intact.  Nares  patent.  Oropharynx clear, moist mucosa,   no erythema or exudate.  LUNGS:  Clear to auscultation bilaterally.  No rales, wheezes, no rhonchi.  HEART:  Regular rhythm.  No rub or gallop.  ABDOMEN:  Distended.  No palpable mass, nontender.  Bowel sounds present.  EXTREMITIES:  Positive for bilateral pedal edema.  No cyanosis.  SKIN:  Warm, dry, no erythema.  NEUROLOGIC:  The patient is moving all extremities, following simple commands.     LABORATORY RESULTS:  Hemoglobin level 9.6.  Sodium 116, BUN ____ and   creatinine 4.25.  Lactic acid 1.8, ammonia 43.  Albumin 2.8, total bilirubin   2.4.     ASSESSMENT AND PLAN:  The patient is a 70-year-old female with multiple   medical problems including liver cirrhosis, hepatorenal syndrome, recently   started on hemodialysis due to acute kidney injury from hepatorenal syndrome,   admitted with altered mental status, severe hyponatremia and intraparenchymal   cranial bleeding.     1.  Acute kidney injury.  No need for emergent dialysis.  Creatinine remains   stable, to monitor.  2.  Electrolytes: Severe hyponatremia, hypokalemia, improving, to monitor   closely.  3.  Metabolic acidosis, well controlled.  4.  Anemia, to monitor hemoglobin level.  5.  Volume, to monitor brain natriuretic peptide.     RECOMMENDATIONS:  Keep Ross catheter.  Monitor daily hemoglobin level, basic   metabolic panel.  Close monitoring of sodium level.  Avoid hypotonic   solutions. If sodium worsening, consider 3% salt.     The patient with very poor prognosis.  Can consider palliative care or   hospice.     The above plan was discussed with Dr. Castellanos.     Thank you for the consult.        ______________________________  MD MICHELLE HOLBROOK/KKII/BRYON    DD:  04/28/2021 12:49  DT:  04/28/2021 17:57    Job#:  [id number]

## 2021-04-29 NOTE — HOSPICE
Discussion with pt about her wishes.  She doesn't want to continue dialysis forever, but she was unable to think about at what point she would want to stop dialysis.  Involved Gary, Brigida, & Marcy in the conversation.  Pt is going to think about everything tonight.  Gary & aMrcy are flying in tomorrow to discuss with her in person.  Will follow up with them tomorrow.  Pt does want to go back to her assisted living facility in [place].  If pt does elect to go to [place] we do not service that area.

## 2021-04-29 NOTE — WOUND TEAM
[facility]  Inpatient Services  Initial Wound and Skin Care Evaluation    Admission Date: 4/27/2021       Last order of IP CONSULT TO WOUND CARE was found on 4/28/2021 from Hospital Encounter on 4/27/2021     HPI, PMH, SH: Reviewed    Past Surgical History:   Procedure Laterality Date   • HUSSAIN BY LAPAROSCOPY     • HYSTERECTOMY LAPAROSCOPY       Social History     Tobacco Use   • Smoking status: Never Smoker   • Smokeless tobacco: Never Used   Substance Use Topics   • Alcohol use: Yes     Chief Complaint   Patient presents with   • ALOC     Transfer from [facility]. Head CT shows intraparenchymal hemorrhage.      Diagnosis: Hyponatremia [E87.1]    Unit where seen by Wound Team: T619/00     WOUND CONSULT/FOLLOW UP RELATED TO:  Sacrum, buttocks     WOUND HISTORY:  Patient admitted due to worsening AMS and was found to have an intraparenchymal hemorrhage. She has a history of alcohol abuse, liver cirrhosis, hepatorenal syndrome, and has suffered multiple falls at home.     WOUND ASSESSMENT/LDA      Wound 04/28/21 Pressure Injury Buttocks Posterior Right;Left (Active)   Wound Image       04/29/21 1600   Site Assessment Pink;Purple;Red 04/29/21 1600   Periwound Assessment Maceration;Painful 04/29/21 1600   Margins Undefined edges 04/29/21 1600   Closure Secondary intention 04/29/21 1600   Drainage Amount Scant 04/29/21 1600   Drainage Description Serosanguineous 04/29/21 1600   Treatments Cleansed;Offloading;Site care 04/29/21 1600   Wound Cleansing Foam Cleanser/Washcloth 04/29/21 1600   Periwound Protectant Barrier Paste 04/29/21 1600   Dressing Cleansing/Solutions Not Applicable 04/29/21 1600   Dressing Options Open to Air 04/29/21 1600   Dressing Change/Treatment Frequency Every Shift, and As Needed 04/29/21 1600   NEXT Dressing Change/Treatment Date 04/29/21 04/29/21 1600   NEXT Weekly Photo (Inpatient Only) 05/06/21 04/29/21 1600   Pressure Injury Stage DTPI 04/29/21 1600   Shape irregular  04/29/21 1600   Wound Odor None 04/29/21 1600   Exposed Structures ESAU 04/29/21 1600   WOUND NURSE ONLY - Time Spent with Patient (mins) 30 04/29/21 1600          Vascular:    DESTIN:   No results found.    Lab Values:    Lab Results   Component Value Date/Time    WBC 11.9 (H) 04/29/2021 03:39 AM    RBC 2.96 (L) 04/29/2021 03:39 AM    HEMOGLOBIN 9.8 (L) 04/29/2021 03:39 AM    HEMATOCRIT 27.0 (L) 04/29/2021 03:39 AM    CREACTPROT 4.29 (H) 07/14/2019 01:22 AM    HBA1C 5.1 01/26/2021 05:42 AM        Culture Results show:  No results found for this or any previous visit (from the past 720 hour(s)).    Pain Level/Medicated:  Pain when turning     INTERVENTIONS BY WOUND TEAM:  Chart and images reviewed. Discussed with bedside RN. All areas of concern (based on picture review, LDA review and discussion with bedside RN) have been thoroughly assessed. Documentation of areas based on significant findings. This RN in to assess patient. Performed standard wound care which includes appropriate positioning, dressing removal and non-selective debridement. Pictures and measurements obtained weekly if/when required.    Preparation for Dressing removal: NA  Cleansed with: no rinse foam soap and washcloth  Sharp debridement: NA  [place] wound: Cleansed with foam soap and washcloth, Prepped with barrier paste  Primary Dressing: NA  Secondary (Outer) Dressing: NA    Interdisciplinary consultation: Patient, Bedside RN (Sean)     EVALUATION / RATIONALE FOR TREATMENT:  Most Recent Date:    4/29/21: Diffuse area of purple discoloration noted to right buttock and along (L) IT. Wounds are not yet fully demarcated and is still evolving. Patient is incontinent of stool. Barrier paste applied during perineal care, however no viscopaste available at time of assessment. Will order viscopaste as viscopatch zinc impregnated gauze to encourage re-epithelialization of superficial 100% viable wound bed, and to provide a non-stick wound contact layer.    * Pt  transitioned to comfort care/hospice.      Goals: Steady decrease in wound area and depth weekly.    WOUND TEAM PLAN OF CARE ([X] for frequency of wound follow up,):   Nursing to follow orders written for wound care. Contact wound team if area fails to progress, deteriorates or with any questions/concerns  Dressing changes by wound team:                   Follow up 3 times weekly:                NPWT change 3 times weekly:     Follow up 1-2 times weekly:    Follow up Bi-Monthly:                   Follow up as needed:  X, please reconsult wound team if wound fails to progress    Other (explain):     NURSING PLAN OF CARE ORDERS (X):  Dressing changes: See Dressing Care orders: X  Skin care: See Skin Care orders:   RN Prevention Protocol: X  Rectal tube care: See Rectal Tube Care orders:   Other orders:    RSKIN:   CURRENTLY IN PLACE (X), APPLIED THIS VISIT (A), ORDERED (O):   Q shift Gerald:  X  Q shift pressure point assessments:  X    Surface/Positioning   Pressure redistribution mattress            Low Airloss        X  Bariatric foam      Bariatric COBY     Waffle cushion        Waffle Overlay          Reposition q 2 hours    O  TAPs Turning system   O  Z Kip Pillow     Offloading/Redistribution   Sacral Mepilex (Silicone dressing)   C/I due to incontinence   Heel Mepilex (Silicone dressing)         Heel float boots (Prevalon boot)             Float Heels off Bed with Pillows      X     Respiratory NA  Silicone O2 tubing         Gray Foam Ear protectors     Cannula fixation Device (Tender )          High flow offloading Clip    Elastic head band offloading device      Anchorfast                                                         Trach with Optifoam split foam             Containment/Moisture Prevention   Rectal tube or BMS    Purwick/Condom Cath        Ross Catheter    Barrier wipes           Barrier paste   X    Antifungal tx      Interdry        Mobilization ESAU  Up to chair        Ambulate      PT/OT       Nutrition ESAU  Dietician        Diabetes Education      PO     TF     TPN     NPO   # days     Other        Anticipated discharge plans: TBD, pt transitioned to comfort care  LTACH:        SNF/Rehab:                  Home Health Care:           Outpatient Wound Center:            Self/Family Care:        Other:

## 2021-04-29 NOTE — CARE PLAN
Problem: Communication  Goal: The ability to communicate needs accurately and effectively will improve  Outcome: PROGRESSING AS EXPECTED     Problem: Safety  Goal: Will remain free from injury  Outcome: PROGRESSING AS EXPECTED  Goal: Will remain free from falls  Outcome: PROGRESSING AS EXPECTED     Problem: Infection  Goal: Will remain free from infection  Outcome: PROGRESSING AS EXPECTED     Problem: Venous Thromboembolism (VTW)/Deep Vein Thrombosis (DVT) Prevention:  Goal: Patient will participate in Venous Thrombosis (VTE)/Deep Vein Thrombosis (DVT)Prevention Measures  Outcome: PROGRESSING AS EXPECTED     Problem: Bowel/Gastric:  Goal: Normal bowel function is maintained or improved  Outcome: PROGRESSING AS EXPECTED  Goal: Will not experience complications related to bowel motility  Outcome: PROGRESSING AS EXPECTED     Problem: Knowledge Deficit  Goal: Knowledge of disease process/condition, treatment plan, diagnostic tests, and medications will improve  Outcome: PROGRESSING AS EXPECTED  Goal: Knowledge of the prescribed therapeutic regimen will improve  Outcome: PROGRESSING AS EXPECTED     Problem: Discharge Barriers/Planning  Goal: Patient's continuum of care needs will be met  Outcome: PROGRESSING AS EXPECTED     Problem: Pain Management  Goal: Pain level will decrease to patient's comfort goal  Outcome: PROGRESSING AS EXPECTED     Problem: Fluid Volume:  Goal: Will maintain balanced intake and output  Outcome: PROGRESSING AS EXPECTED     Problem: Psychosocial Needs:  Goal: Level of anxiety will decrease  Outcome: PROGRESSING AS EXPECTED     Problem: Respiratory:  Goal: Respiratory status will improve  Outcome: PROGRESSING AS EXPECTED     Problem: Skin Integrity  Goal: Risk for impaired skin integrity will decrease  Outcome: PROGRESSING AS EXPECTED

## 2021-04-29 NOTE — PROGRESS NOTES
Full consult note to follow. Patient with small stable R posterior parietal/periventricular  IPH, no mass effect, sounds like patients severe medical issues are the causative problem for her decline. No neurosurgical intervention required.

## 2021-04-30 NOTE — DISCHARGE INSTRUCTIONS
Physician Discharge Instructions:  Discharge Diagnosis: Hospice care, hepatorenal syndrome, encephalopathy  Proceed to discharge/transfer  to hospice, facility to be determined  Take Rx/Prescriptions as prescribed and reconciled per AVS  For OTC Medication consult with your Pharmacist first.  Diet: As tolerated  Activity: As tolerated  F/u with [facility] hospice provider who will direct further care and medication prescription administration      J Luis Hurd MD

## 2021-04-30 NOTE — DISCHARGE PLANNING
Received Choice form at 1152  Agency/Facility Name: [facility]  Referral sent per Choice form at 115

## 2021-04-30 NOTE — PALLIATIVE CARE
Palliative Care follow-up  Pt is now on comfort care and would like to go back to [place] to her assisted living facility.  Pt has a DNR Polst completed and Dana ZUNIGA sent choice form for daughter to select hospice agency. Will cancel Palliative care order. Please reach out if we can be of any service.       Updated: Dana ZUNIGA     Plan: choice for hospice and community hospice to support pt in end of life.     Thank you for allowing Palliative Care to support this patient and family. Contact [phone] for additional assistance, change in patient status, or with any questions/concerns.

## 2021-04-30 NOTE — DISCHARGE PLANNING
Anticipated Discharge Disposition: TBD with hospice    Action: Spoke with patient's daughter Brigida at the bedside about hospice choices. Brigida picked [facility].    Choice form faxed to Aimee MICHELLE at [phone].    Barriers to Discharge: Hospice acceptance    Plan: Will follow up with [facility].

## 2021-04-30 NOTE — PROGRESS NOTES
[facility] Medicine Daily Progress Note    Date of Service  4/30/2021    Chief Complaint  70 y.o. female admitted 4/27/2021 with hyponatremia, ALOC    Hospital Course  This is a 70-year-old female with a history of alcohol abuse, alcoholic cirrhosis, hepatorenal syndrome on hemodialysis, history of hypothyroidism, hypertension, subdural hematoma brought to our facility in transfer from [facility] where the patient had apparently been found confused, the CT was showing reportedly a intraparenchymal hemorrhage.  The patient was not a reliable historian to time, has had frequent falls apparently, was residing at a rehab facility.  The patient was found to have a sodium of 114, the patient referred in transfer to the intensive care physicians here as well as Dr. Watson from neurosurgery.  The patient mated to ICU with the above concerns.    Interval Problem Update  Patient seen and examined today.    Patient tolerating treatment and therapies.  All Data, Medication data reviewed.  Case discussed with nursing as available.  Plan of Care reviewed with patient and notified of changes.  4/28 the patient reportedly is stabilized for transfer out of ICU by the intensivist, a official neurosurgical evaluation is pending, a follow-up CTA head did not show  no large vessel occlusion or aneurysmal findings, the initial CT without contrast showed a 15 mm area of increased density involving the right posterior parietal lobe.  Preliminary report from neurosurgery was that there is no intervention planned at this time.  Nephrology was called for ongoing hemodialysis and sodium correction.  It was felt that the patient at this time is too high risk for ongoing hemodialysis, sodium correction if asked physician or concentrated sodium solution.  The patient is confused, she is unable to tell me the location or year or her current condition, the patient does have a DNR/DNI status.  Palliative care and hospice has been  consulted  4/29 the patient with fairly unchanged status, confused, lethargic, denies current pain, laboratory data with slight improvement, slightly higher sodium level, persistent renal dysfunction, hospice met with patient and conversation with family, proceed with comfort care/hospice, family to arrive later on today to spend time with patient  4/30 the patient remains lethargic, confused, appears comfortable on current medication regimen, all family members at the bedside conferring with hospice, [place].  Location of hospice and transferred to be determined.  Consultants/Specialty  Critical care  Neurosurgery  Nephrology  Code Status  Comfort Care/DNR    Disposition  Institutionalized hospice, location to be determined    Review of Systems  Review of Systems   Unable to perform ROS: Medical condition        Physical Exam  Temp:  [36.3 °C (97.3 °F)-36.5 °C (97.7 °F)] 36.3 °C (97.3 °F)  Pulse:  [79-99] 89  Resp:  [18] 18  BP: (115-126)/(63-77) 115/71  SpO2:  [89 %-99 %] 91 %    Physical Exam  Vitals and nursing note reviewed.   Constitutional:       Appearance: She is well-developed. She is obese. She is ill-appearing. She is not diaphoretic.      Interventions: Nasal cannula in place.   HENT:      Head: Normocephalic and atraumatic.      Nose: Nose normal.   Eyes:      Conjunctiva/sclera: Conjunctivae normal.      Pupils: Pupils are equal, round, and reactive to light.   Neck:      Thyroid: No thyromegaly.      Vascular: No JVD.   Cardiovascular:      Rate and Rhythm: Normal rate and regular rhythm.      Heart sounds: Normal heart sounds. No friction rub. No gallop.       Comments: Right upper chest tunneled dialysis catheter  Pulmonary:      Effort: Pulmonary effort is normal.      Breath sounds: Rhonchi present. No wheezing or rales.   Abdominal:      General: Bowel sounds are normal. There is no distension.      Palpations: There is no mass.      Tenderness: There is no abdominal tenderness. There is no  guarding or rebound.      Hernia: A hernia is present.   Musculoskeletal:         General: Swelling present. No tenderness. Normal range of motion.      Cervical back: Normal range of motion and neck supple.   Lymphadenopathy:      Cervical: No cervical adenopathy.   Skin:     General: Skin is warm and dry.      Coloration: Skin is pale.   Neurological:      Mental Status: She is lethargic and disoriented.      Cranial Nerves: No cranial nerve deficit.         Fluids    Intake/Output Summary (Last 24 hours) at 4/30/2021 1124  Last data filed at 4/30/2021 0400  Gross per 24 hour   Intake 60 ml   Output 175 ml   Net -115 ml       Laboratory  Recent Labs     04/27/21 2216 04/28/21  0250 04/29/21  0339   WBC 14.2* 15.3* 11.9*   RBC 3.53* 2.86* 2.96*   HEMOGLOBIN 11.9* 9.6* 9.8*   HEMATOCRIT 32.3* 26.2* 27.0*   MCV 91.5 91.6 91.2   MCH 33.7* 33.6* 33.1*   MCHC 36.8* 36.6* 36.3*   RDW 49.3 49.2 49.3   PLATELETCT 230 191 216   MPV 10.2 10.4 10.4     Recent Labs     04/27/21 2216 04/27/21 2216 04/28/21  0250 04/28/21  0250 04/28/21  0711 04/28/21  1110 04/29/21  0339   SODIUM 114*   < > 113*   < > 113* 116* 116*   POTASSIUM 4.0  --  3.4*  --   --   --  3.6   CHLORIDE 78*  --  79*  --   --   --  80*   CO2 22  --  21  --   --   --  23   GLUCOSE 89  --  87  --   --   --  86   BUN 33*  --  30*  --   --   --  37*   CREATININE 4.46*  --  4.25*  --   --   --  4.29*   CALCIUM 8.8  --  8.2*  --   --   --  8.2*    < > = values in this interval not displayed.     Recent Labs     04/27/21 2216 04/29/21  0339   APTT 45.2*  --    INR 1.77* 1.75*               Imaging  CT-CTA HEAD WITH & W/O-POST PROCESS   Final Result         1.  No large vessel occlusion or aneurysm identified.           Assessment/Plan  * Hyponatremia- (present on admission)  Assessment & Plan  Sodium 114 on presentation  Possibly hypervolemic hyponatremia  History of cirrhosis and hepatorenal syndrome      ICH (intracerebral hemorrhage) (HCC)- (present on  admission)  Assessment & Plan  CT imaging today with possible intraparenchymal hemorrhage  Neurosurgery consulted  No intervention  Now care deescalated to comfort care, hospice    Cirrhosis (HCC)- (present on admission)  Assessment & Plan  History of alcohol abuse and alcoholic cirrhosis  LFTs mildly elevated  -Avoid hepatotoxic medications if possible  -Monitor  Patient does not appear to be a transplant candidate, palliative care,   D escalated to comfort care/hospice    Hepatorenal syndrome (HCC)- (present on admission)  Assessment & Plan  Descalated to comfort care, pre-existing poor prognosis    Leukocytosis- (present on admission)  Assessment & Plan  Likely reactive, no obvious evidence of infection    Anemia due to chronic kidney disease, on chronic dialysis (HCC)- (present on admission)  Assessment & Plan  With possible intermittent blood loss    Encephalopathy- (present on admission)  Assessment & Plan  Likely multifactorial, metabolic, history of TBI  Hyponatremia      DNR (do not resuscitate)- (present on admission)  Assessment & Plan  Pre-existing, implemented  Care now D escalated to hospice and comfort care    Contraindication to deep vein thrombosis (DVT) prophylaxis- (present on admission)  Assessment & Plan  Not indicated    Essential hypertension- (present on admission)  Assessment & Plan  History of, now comfort care    Hypothyroid- (present on admission)  Assessment & Plan  History of, now comfort care    Plan  Patient to change to comfort care and hospice transfer to a previous location in [place] as per patient's and family's wishes, or alternative location  Continue current conservative measures, comfort care measures    See orders  Medically complex and high risk patient very poor prognosis, end-of-life decision-making    VTE prophylaxis: Contraindicated, SCD      I have performed a physical exam and reviewed and updated ROS and Plan today . In review of yesterday's note , there are no  changes except as documented above.        Please note that this dictation was created using voice recognition software. I have made every reasonable attempt to correct obvious errors, but I expect that there are errors of grammar and possibly context that I did not discover before finalizing the note.

## 2021-05-01 NOTE — PROGRESS NOTES
[facility] Medicine Daily Progress Note    Date of Service  5/1/2021    Chief Complaint  70 y.o. female admitted 4/27/2021 with hyponatremia, ALOC    Hospital Course  This is a 70-year-old female with a history of alcohol abuse, alcoholic cirrhosis, hepatorenal syndrome on hemodialysis, history of hypothyroidism, hypertension, subdural hematoma brought to our facility in transfer from [facility] where the patient had apparently been found confused, the CT was showing reportedly a intraparenchymal hemorrhage.  The patient was not a reliable historian to time, has had frequent falls apparently, was residing at a rehab facility.  The patient was found to have a sodium of 114, the patient referred in transfer to the intensive care physicians here as well as Dr. Watson from neurosurgery.  The patient mated to ICU with the above concerns.    Interval Problem Update  Patient seen and examined today.    Patient tolerating treatment and therapies.  All Data, Medication data reviewed.  Case discussed with nursing as available.  Plan of Care reviewed with patient and notified of changes.  4/28 the patient reportedly is stabilized for transfer out of ICU by the intensivist, a official neurosurgical evaluation is pending, a follow-up CTA head did not show  no large vessel occlusion or aneurysmal findings, the initial CT without contrast showed a 15 mm area of increased density involving the right posterior parietal lobe.  Preliminary report from neurosurgery was that there is no intervention planned at this time.  Nephrology was called for ongoing hemodialysis and sodium correction.  It was felt that the patient at this time is too high risk for ongoing hemodialysis, sodium correction if asked physician or concentrated sodium solution.  The patient is confused, she is unable to tell me the location or year or her current condition, the patient does have a DNR/DNI status.  Palliative care and hospice has been  consulted  4/29 the patient with fairly unchanged status, confused, lethargic, denies current pain, laboratory data with slight improvement, slightly higher sodium level, persistent renal dysfunction, hospice met with patient and conversation with family, proceed with comfort care/hospice, family to arrive later on today to spend time with patient  4/30 the patient remains lethargic, confused, appears comfortable on current medication regimen, all family members at the bedside conferring with hospice, [place].  Location of hospice and transferred to be determined.  5/1 the patient remains with comfort measures in place, awaiting hospice disposition, her current medication regimen appears adequate to keep the patient comfortable.  Family is visiting.  Consultants/Specialty  Critical care  Neurosurgery  Nephrology  Code Status  Comfort Care/DNR    Disposition  Institutionalized hospice, location to be determined    Review of Systems  Review of Systems   Unable to perform ROS: Medical condition        Physical Exam  Temp:  [36.2 °C (97.2 °F)] 36.2 °C (97.2 °F)  BP: (103)/(65) 103/65    Physical Exam  Vitals and nursing note reviewed.   Constitutional:       Appearance: She is well-developed. She is obese. She is ill-appearing. She is not diaphoretic.      Interventions: Nasal cannula in place.   HENT:      Head: Normocephalic and atraumatic.      Nose: Nose normal.   Eyes:      Conjunctiva/sclera: Conjunctivae normal.      Pupils: Pupils are equal, round, and reactive to light.   Neck:      Thyroid: No thyromegaly.      Vascular: No JVD.   Cardiovascular:      Rate and Rhythm: Normal rate and regular rhythm.      Heart sounds: Normal heart sounds. No friction rub. No gallop.       Comments: Right upper chest tunneled dialysis catheter  Pulmonary:      Effort: Pulmonary effort is normal.      Breath sounds: Rhonchi present. No wheezing or rales.   Abdominal:      General: Bowel sounds are normal. There is no distension.       Palpations: There is no mass.      Tenderness: There is no abdominal tenderness. There is no guarding or rebound.      Hernia: A hernia is present.   Musculoskeletal:         General: Swelling present. No tenderness. Normal range of motion.      Cervical back: Normal range of motion and neck supple.   Lymphadenopathy:      Cervical: No cervical adenopathy.   Skin:     General: Skin is warm and dry.      Coloration: Skin is pale.   Neurological:      Mental Status: She is lethargic and disoriented.      Cranial Nerves: No cranial nerve deficit.         Fluids    Intake/Output Summary (Last 24 hours) at 5/1/2021 1123  Last data filed at 5/1/2021 1000  Gross per 24 hour   Intake 120 ml   Output 95 ml   Net 25 ml       Laboratory  Recent Labs     04/29/21  0339   WBC 11.9*   RBC 2.96*   HEMOGLOBIN 9.8*   HEMATOCRIT 27.0*   MCV 91.2   MCH 33.1*   MCHC 36.3*   RDW 49.3   PLATELETCT 216   MPV 10.4     Recent Labs     04/29/21  0339   SODIUM 116*   POTASSIUM 3.6   CHLORIDE 80*   CO2 23   GLUCOSE 86   BUN 37*   CREATININE 4.29*   CALCIUM 8.2*     Recent Labs     04/29/21  0339   INR 1.75*               Imaging  CT-CTA HEAD WITH & W/O-POST PROCESS   Final Result         1.  No large vessel occlusion or aneurysm identified.           Assessment/Plan  * Hyponatremia- (present on admission)  Assessment & Plan  Sodium 114 on presentation  Possibly hypervolemic hyponatremia  History of cirrhosis and hepatorenal syndrome      ICH (intracerebral hemorrhage) (HCC)- (present on admission)  Assessment & Plan  CT imaging today with possible intraparenchymal hemorrhage  Neurosurgery consulted  No intervention  Now care deescalated to comfort care, hospice    Cirrhosis (HCC)- (present on admission)  Assessment & Plan  History of alcohol abuse and alcoholic cirrhosis  LFTs mildly elevated  -Avoid hepatotoxic medications if possible  -Monitor  Patient does not appear to be a transplant candidate, palliative care,   D escalated to  comfort care/hospice    Hepatorenal syndrome (HCC)- (present on admission)  Assessment & Plan  Descalated to comfort care, pre-existing poor prognosis    Leukocytosis- (present on admission)  Assessment & Plan  Likely reactive, no obvious evidence of infection    Anemia due to chronic kidney disease, on chronic dialysis (HCC)- (present on admission)  Assessment & Plan  With possible intermittent blood loss    Encephalopathy- (present on admission)  Assessment & Plan  Likely multifactorial, metabolic, history of TBI  Hyponatremia      DNR (do not resuscitate)- (present on admission)  Assessment & Plan  Pre-existing, implemented  Care now D escalated to hospice and comfort care    Contraindication to deep vein thrombosis (DVT) prophylaxis- (present on admission)  Assessment & Plan  Not indicated    Essential hypertension- (present on admission)  Assessment & Plan  History of, now comfort care    Hypothyroid- (present on admission)  Assessment & Plan  History of, now comfort care    Plan  Patient to change to comfort care and hospice transfer to a previous location in [place] as per patient's and family's wishes, or alternative location  Continue current conservative measures, comfort care measures    See orders  Medically complex and high risk patient very poor prognosis, end-of-life decision-making    VTE prophylaxis: Contraindicated, SCD      I have performed a physical exam and reviewed and updated ROS and Plan today . In review of yesterday's note , there are no changes except as documented above.        Please note that this dictation was created using voice recognition software. I have made every reasonable attempt to correct obvious errors, but I expect that there are errors of grammar and possibly context that I did not discover before finalizing the note.

## 2021-05-01 NOTE — CARE PLAN
Comfort care orders in place.    Problem: Communication  Goal: The ability to communicate needs accurately and effectively will improve  Outcome: PROGRESSING AS EXPECTED     Problem: Safety  Goal: Will remain free from injury  Outcome: PROGRESSING AS EXPECTED  Goal: Will remain free from falls  Outcome: PROGRESSING AS EXPECTED     Problem: Infection  Goal: Will remain free from infection  Outcome: PROGRESSING AS EXPECTED     Problem: Venous Thromboembolism (VTW)/Deep Vein Thrombosis (DVT) Prevention:  Goal: Patient will participate in Venous Thrombosis (VTE)/Deep Vein Thrombosis (DVT)Prevention Measures  Outcome: PROGRESSING AS EXPECTED     Problem: Bowel/Gastric:  Goal: Normal bowel function is maintained or improved  Outcome: PROGRESSING AS EXPECTED  Goal: Will not experience complications related to bowel motility  Outcome: PROGRESSING AS EXPECTED     Problem: Knowledge Deficit  Goal: Knowledge of disease process/condition, treatment plan, diagnostic tests, and medications will improve  Outcome: PROGRESSING AS EXPECTED  Goal: Knowledge of the prescribed therapeutic regimen will improve  Outcome: PROGRESSING AS EXPECTED     Problem: Discharge Barriers/Planning  Goal: Patient's continuum of care needs will be met  Outcome: PROGRESSING AS EXPECTED     Problem: Pain Management  Goal: Pain level will decrease to patient's comfort goal  Outcome: PROGRESSING AS EXPECTED     Problem: Fluid Volume:  Goal: Will maintain balanced intake and output  Outcome: PROGRESSING AS EXPECTED     Problem: Psychosocial Needs:  Goal: Level of anxiety will decrease  Outcome: PROGRESSING AS EXPECTED     Problem: Respiratory:  Goal: Respiratory status will improve  Outcome: PROGRESSING AS EXPECTED     Problem: Skin Integrity  Goal: Risk for impaired skin integrity will decrease  Outcome: PROGRESSING AS EXPECTED

## 2021-05-01 NOTE — PROGRESS NOTES
Spiritual Care Note    Patient Information     Patient's Name: Jennifer Koroma   MRN: [id number]    YOB: 1950   Age and Gender: 70 y.o. female   Service Area: CARDIAC ICU [facility]   Room (and Bed): T619/00   Ethnicity or Nationality:     Primary Language: English   Caodaism/Spiritual preference: Rastafarian   Place of Residence: [place]   Family/Friends/Others Present: Daughter, Son, granddaughter   Clinical Team Present: RN   Medical Diagnosis(-es)/Procedure(s): Hyponatremia   Code Status: Comfort Care/DNR    Date of Admission: 4/27/2021   Length of Stay: 3 days        Spiritual Care Provider Information:  Name of Spiritual Care Provider: Jing Young  Title of Spiritual Care Provider: Associate   Phone Number: [phone]  E-mail: [email or web address]  Total time : 45 minutes    Spiritual Screen Results:    Encounter/Request Information  Encounter/Request Type     Visited With: Patient and family together    Nature of the Visit: Initial, On shift, Follow-up    Continue Visiting: Yes    Next Follow-up Date: 05/03/21    Crisis Visit: COMFORT CARE    Referral From/ Origin of Request: [place] rounds    Referral To: Community clergy    Religous Needs/Values  Ritual Needs Visit    Ritual Needs: Anointing    Spiritual Assessment   Spiritual Care Encounters    Observations/Symptoms: Accepting, Thankfulness(Pt pleasant, awake, sense of humor; family BS)  Interacton/Conversation:  f/u with pt from prior early morning visit.  In the morning, pt c/o 'restless legs' and leg pain.  RN day nurse confirmed she will offer appropriate meds once shift change complete. Pt's son, daughter, and granddaughter were BS.  Pt was made comfort care today - family asked if pt could have a  come for the sacrament of the sick.   confirmed with pt that she would like a  to visit.  This  contacted Fr. Edwards and he came this evening to offer the sacrament.   Family is very loving, attentive to pt.   took picture of family with family phone per family request.  Pt had continuous humor throughout visit.    Assessment: Need    Need: Seeking Spiritual Assistance and Support    Intended Effects: Maria De Jesus Affirmation, Helping Someone Feel Comforted, Demonstrate Caring and Concern    Interventions: Compassionate Presence, Reflective Listening, Anointing, Comfort    Outcomes: Acceptance, Connectedness with the Holy/with God, Love/Belonging/ Compassion/Kindness/Acceptance, Spiritual Comfort, Value/Dignity/Respect    Plan: (continue ff. pt/family)    Notes:

## 2021-05-01 NOTE — CARE PLAN
Problem: Pain Management  Goal: Pain level will decrease to patient's comfort goal  Outcome: PROGRESSING AS EXPECTED  Note: Prn pain meds administered per MAR. Pain appears to be under control with new order for dilaudid.      Problem: Skin Integrity  Goal: Risk for impaired skin integrity will decrease  Outcome: PROGRESSING AS EXPECTED  Note: Patient repositioned as tolerated. Incontinence care provided. Barrier cream applied to sacrum area

## 2021-05-02 NOTE — CARE PLAN
Problem: Safety  Goal: Will remain free from falls  Outcome: PROGRESSING AS EXPECTED  Intervention: Implement fall precautions  Flowsheets  Taken 5/2/2021 0105  Bed Alarm: Alarm Not On  Taken 5/2/2021 0000  Environmental Precautions: Bed in Low Position     Problem: Pain Management  Goal: Pain level will decrease to patient's comfort goal  Outcome: PROGRESSING AS EXPECTED     Problem: Psychosocial Needs:  Goal: Level of anxiety will decrease  Outcome: PROGRESSING AS EXPECTED     Problem: Skin Integrity  Goal: Risk for impaired skin integrity will decrease  Outcome: PROGRESSING AS EXPECTED

## 2021-05-02 NOTE — PROGRESS NOTES
[facility] Medicine Daily Progress Note    Date of Service  5/2/2021    Chief Complaint  70 y.o. female admitted 4/27/2021 with hyponatremia, ALOC    Hospital Course  This is a 70-year-old female with a history of alcohol abuse, alcoholic cirrhosis, hepatorenal syndrome on hemodialysis, history of hypothyroidism, hypertension, subdural hematoma brought to our facility in transfer from [facility] where the patient had apparently been found confused, the CT was showing reportedly a intraparenchymal hemorrhage.  The patient was not a reliable historian to time, has had frequent falls apparently, was residing at a rehab facility.  The patient was found to have a sodium of 114, the patient referred in transfer to the intensive care physicians here as well as Dr. Watson from neurosurgery.  The patient mated to ICU with the above concerns.    Interval Problem Update  Patient seen and examined today.    Patient tolerating treatment and therapies.  All Data, Medication data reviewed.  Case discussed with nursing as available.  Plan of Care reviewed with patient and notified of changes.  4/28 the patient reportedly is stabilized for transfer out of ICU by the intensivist, a official neurosurgical evaluation is pending, a follow-up CTA head did not show  no large vessel occlusion or aneurysmal findings, the initial CT without contrast showed a 15 mm area of increased density involving the right posterior parietal lobe.  Preliminary report from neurosurgery was that there is no intervention planned at this time.  Nephrology was called for ongoing hemodialysis and sodium correction.  It was felt that the patient at this time is too high risk for ongoing hemodialysis, sodium correction if asked physician or concentrated sodium solution.  The patient is confused, she is unable to tell me the location or year or her current condition, the patient does have a DNR/DNI status.  Palliative care and hospice has been  consulted  4/29 the patient with fairly unchanged status, confused, lethargic, denies current pain, laboratory data with slight improvement, slightly higher sodium level, persistent renal dysfunction, hospice met with patient and conversation with family, proceed with comfort care/hospice, family to arrive later on today to spend time with patient  4/30 the patient remains lethargic, confused, appears comfortable on current medication regimen, all family members at the bedside conferring with hospice, [place].  Location of hospice and transferred to be determined.  5/1 the patient remains with comfort measures in place, awaiting hospice disposition, her current medication regimen appears adequate to keep the patient comfortable.  Family is visiting.  5/2 the patient with no new complaints today, her abdomen is more protuberant but she appears otherwise comfortable, poor p.o. intake and poor urine output persisting.  Ongoing comfort care measures, family visiting, disposition still pending  Consultants/Specialty  Critical care  Neurosurgery  Nephrology  Code Status  Comfort Care/DNR    Disposition  Institutionalized hospice, location to be determined    Review of Systems  Review of Systems   Unable to perform ROS: Medical condition        Physical Exam  Temp:  [35.7 °C (96.3 °F)] 35.7 °C (96.3 °F)  Pulse:  [83-88] 88  Resp:  [18] 18  BP: (108)/(57) 108/57  SpO2:  [91 %-97 %] 97 %    Physical Exam  Vitals and nursing note reviewed.   Constitutional:       Appearance: She is well-developed. She is obese. She is ill-appearing. She is not diaphoretic.      Interventions: Nasal cannula in place.   HENT:      Head: Normocephalic and atraumatic.      Nose: Nose normal.   Eyes:      Conjunctiva/sclera: Conjunctivae normal.      Pupils: Pupils are equal, round, and reactive to light.   Neck:      Thyroid: No thyromegaly.      Vascular: No JVD.   Cardiovascular:      Rate and Rhythm: Normal rate and regular rhythm.      Heart  sounds: Normal heart sounds. No friction rub. No gallop.       Comments: Right upper chest tunneled dialysis catheter  Pulmonary:      Effort: Pulmonary effort is normal.      Breath sounds: Rhonchi present. No wheezing or rales.   Abdominal:      General: Bowel sounds are normal. There is no distension.      Palpations: There is no mass.      Tenderness: There is no abdominal tenderness. There is no guarding or rebound.      Hernia: A hernia is present.   Musculoskeletal:         General: Swelling present. No tenderness. Normal range of motion.      Cervical back: Normal range of motion and neck supple.   Lymphadenopathy:      Cervical: No cervical adenopathy.   Skin:     General: Skin is warm and dry.      Coloration: Skin is pale.   Neurological:      Mental Status: She is lethargic and disoriented.      Cranial Nerves: No cranial nerve deficit.         Fluids    Intake/Output Summary (Last 24 hours) at 5/2/2021 1208  Last data filed at 5/2/2021 0800  Gross per 24 hour   Intake 270 ml   Output 80 ml   Net 190 ml       Laboratory                        Imaging  CT-CTA HEAD WITH & W/O-POST PROCESS   Final Result         1.  No large vessel occlusion or aneurysm identified.           Assessment/Plan  * Hyponatremia- (present on admission)  Assessment & Plan  Sodium 114 on presentation  Possibly hypervolemic hyponatremia  History of cirrhosis and hepatorenal syndrome      ICH (intracerebral hemorrhage) (HCC)- (present on admission)  Assessment & Plan  CT imaging today with possible intraparenchymal hemorrhage  Neurosurgery consulted  No intervention  Now care deescalated to comfort care, hospice    Cirrhosis (HCC)- (present on admission)  Assessment & Plan  History of alcohol abuse and alcoholic cirrhosis  LFTs mildly elevated  -Avoid hepatotoxic medications if possible  -Monitor  Patient does not appear to be a transplant candidate, palliative care,   D escalated to comfort care/hospice    Hepatorenal syndrome  (HCC)- (present on admission)  Assessment & Plan  Descalated to comfort care, pre-existing poor prognosis    Leukocytosis- (present on admission)  Assessment & Plan  Likely reactive, no obvious evidence of infection    Anemia due to chronic kidney disease, on chronic dialysis (HCC)- (present on admission)  Assessment & Plan  With possible intermittent blood loss    Encephalopathy- (present on admission)  Assessment & Plan  Likely multifactorial, metabolic, history of TBI  Hyponatremia      DNR (do not resuscitate)- (present on admission)  Assessment & Plan  Pre-existing, implemented  Care now D escalated to hospice and comfort care    Contraindication to deep vein thrombosis (DVT) prophylaxis- (present on admission)  Assessment & Plan  Not indicated    Essential hypertension- (present on admission)  Assessment & Plan  History of, now comfort care    Hypothyroid- (present on admission)  Assessment & Plan  History of, now comfort care    Plan  Patient to change to comfort care and hospice transfer to a previous location in [place] as per patient's and family's wishes, or alternative location  Continue current conservative measures, comfort care measures    See orders  Medically complex and high risk patient very poor prognosis, end-of-life decision-making    VTE prophylaxis: Contraindicated, SCD      I have performed a physical exam and reviewed and updated ROS and Plan today . In review of yesterday's note , there are no changes except as documented above.        Please note that this dictation was created using voice recognition software. I have made every reasonable attempt to correct obvious errors, but I expect that there are errors of grammar and possibly context that I did not discover before finalizing the note.

## 2021-05-03 NOTE — DISCHARGE PLANNING
Care Transition Team Discharge Planning    Anticipated Discharge Disposition: d/c w/ hospice      Action: Francescow communicated w/ MD. Pt ready for comfort care.    Lsw called [facility], [facility]rence  [phone]. She indicates the [facility] is not allowing visitors and family said no to transfer back. They want to be w/ her during her passing. Family requesting another facility who will allow visitors.    Family advised [facility] mentioned [facility]. FRANCESCOw indicates that is why Lsw is calling. [facility] RN will call the couple contracted facilities to see if they have open beds. LSw verified Micelle has Lsw's contact number for update ASAP.      Barriers to Discharge: open bed at SNF who accepts pt w/ [facility]    Plan: Lsw will continue to follow, and assist w/ d/c planning.

## 2021-05-03 NOTE — PROGRESS NOTES
Care assumed of pt. Family at bedside. Pt appears to be uncomfortable at this time. Shift vitals obtained.

## 2021-05-03 NOTE — PROGRESS NOTES
Assumed care at 11:08am. Received bedside report from ARNAUD Laureano. Patient resting comfortably in bed, no signs of pain or distress. Family at bedside.

## 2021-05-03 NOTE — DISCHARGE PLANNING
Care Transition Team Discharge Planning    Anticipated Discharge Disposition: d/c to SNF w/ [facility] hospice and [facility], stay inpt w/ hospice if qualifies    Action: Lsw spoke to MD, BS RN, and [facility].    [place]      Lsw sent VM to [facility], RN Nigel.    Lsw sent text to [facility] RN Nigel.    LSw called [facility] RN Laurence to g/u w/ AM phone call.    Laurence has been unable to locate a SNF who will allow visitors including the [facility] who has to see pt daily when on GIP.    Laurence has 2 beds open at  [facility] but they refuse any visitors. Also no visitors at [place] [place], and [place].    Family refused to have pt at home when Laurence suggested on Friday and over weekend. They are not able to do full time out of pocket care and pt's apartment is too small.    BS RN indicates pt is not doing well, and may pass soon.     Lsw sent text to DM to update as above.    Later Lsw noted [facility] is not on service. The admissions RN for [facility] today is Kristin. Lsw requested she please meet w/ pt and family at bedside. She will do so once her 3pm scheduled family meeting is completed.     Barriers to Discharge: TBD    Plan: LSw will continue to follow, and assist w/ d/c planning.

## 2021-05-03 NOTE — PROGRESS NOTES
[facility] Medicine Daily Progress Note    Date of Service  5/3/2021    Chief Complaint  70 y.o. female admitted 4/27/2021 with hyponatremia, ALOC    Hospital Course  This is a 70-year-old female with a history of alcohol abuse, alcoholic cirrhosis, hepatorenal syndrome on hemodialysis, history of hypothyroidism, hypertension, subdural hematoma brought to our facility in transfer from [facility] where the patient had apparently been found confused, the CT was showing reportedly a intraparenchymal hemorrhage.  The patient was not a reliable historian to time, has had frequent falls apparently, was residing at a rehab facility.  The patient was found to have a sodium of 114, the patient referred in transfer to the intensive care physicians here as well as Dr. Watson from neurosurgery.  The patient mated to ICU with the above concerns.    Interval Problem Update  Patient seen and examined today.    Patient tolerating treatment and therapies.  All Data, Medication data reviewed.  Case discussed with nursing as available.  Plan of Care reviewed with patient and notified of changes.  4/28 the patient reportedly is stabilized for transfer out of ICU by the intensivist, a official neurosurgical evaluation is pending, a follow-up CTA head did not show  no large vessel occlusion or aneurysmal findings, the initial CT without contrast showed a 15 mm area of increased density involving the right posterior parietal lobe.  Preliminary report from neurosurgery was that there is no intervention planned at this time.  Nephrology was called for ongoing hemodialysis and sodium correction.  It was felt that the patient at this time is too high risk for ongoing hemodialysis, sodium correction if asked physician or concentrated sodium solution.  The patient is confused, she is unable to tell me the location or year or her current condition, the patient does have a DNR/DNI status.  Palliative care and hospice has been  consulted  4/29 the patient with fairly unchanged status, confused, lethargic, denies current pain, laboratory data with slight improvement, slightly higher sodium level, persistent renal dysfunction, hospice met with patient and conversation with family, proceed with comfort care/hospice, family to arrive later on today to spend time with patient  4/30 the patient remains lethargic, confused, appears comfortable on current medication regimen, all family members at the bedside conferring with hospice, [place].  Location of hospice and transferred to be determined.  5/1 the patient remains with comfort measures in place, awaiting hospice disposition, her current medication regimen appears adequate to keep the patient comfortable.  Family is visiting.  5/2 the patient with no new complaints today, her abdomen is more protuberant but she appears otherwise comfortable, poor p.o. intake and poor urine output persisting.  Ongoing comfort care measures, family visiting, disposition still pending  5/3 the patient is resting, she needs very frequent IV medication administration and assessments for comfort, the patient appears to have intermittent breakthrough pain, family is at the bedside, attentive,  The patient is time requires frequent medical interventions to provide comfort  Including IV medication    Consultants/Specialty  Critical care  Neurosurgery  Nephrology  Code Status  Comfort care, DNR/DNI    Disposition  Institutionalized hospice,     Review of Systems  Review of Systems   Unable to perform ROS: Medical condition        Physical Exam  Temp:  [35.2 °C (95.4 °F)-35.5 °C (95.9 °F)] 35.5 °C (95.9 °F)  Pulse:  [79-94] 94  Resp:  [14-20] 18  BP: (92-96)/(44-65) 96/44  SpO2:  [66 %-92 %] 82 %    Physical Exam  Vitals and nursing note reviewed.   Constitutional:       Appearance: She is well-developed. She is obese. She is ill-appearing. She is not diaphoretic.   HENT:      Head: Normocephalic and atraumatic.       Nose: Nose normal.   Eyes:      Conjunctiva/sclera: Conjunctivae normal.      Pupils: Pupils are equal, round, and reactive to light.   Neck:      Thyroid: No thyromegaly.      Vascular: No JVD.   Cardiovascular:      Rate and Rhythm: Normal rate.      Heart sounds: Normal heart sounds. No friction rub. No gallop.       Comments: Right upper chest tunneled dialysis catheter  Pulmonary:      Effort: Pulmonary effort is normal.      Breath sounds: Rhonchi present. No wheezing or rales.   Abdominal:      General: There is distension.      Tenderness: There is abdominal tenderness.      Hernia: A hernia is present.   Musculoskeletal:         General: Swelling present. No tenderness.      Cervical back: Normal range of motion.   Lymphadenopathy:      Cervical: No cervical adenopathy.   Skin:     General: Skin is dry.      Coloration: Skin is pale.   Neurological:      Mental Status: She is disoriented.         Fluids    Intake/Output Summary (Last 24 hours) at 5/3/2021 1152  Last data filed at 5/3/2021 0800  Gross per 24 hour   Intake 0 ml   Output 0 ml   Net 0 ml       Laboratory                        Imaging  CT-CTA HEAD WITH & W/O-POST PROCESS   Final Result         1.  No large vessel occlusion or aneurysm identified.           Assessment/Plan  * Hyponatremia- (present on admission)  Assessment & Plan  Sodium 114 on presentation  Possibly hypervolemic hyponatremia  History of cirrhosis and hepatorenal syndrome      ICH (intracerebral hemorrhage) (HCC)- (present on admission)  Assessment & Plan  CT imaging today with possible intraparenchymal hemorrhage  Neurosurgery consulted  No intervention  Now care deescalated to comfort care, hospice    Cirrhosis (HCC)- (present on admission)  Assessment & Plan  History of alcohol abuse and alcoholic cirrhosis  LFTs mildly elevated  -Avoid hepatotoxic medications if possible  -Monitor  Patient does not appear to be a transplant candidate, palliative care,   D escalated to  comfort care/hospice    Hepatorenal syndrome (HCC)- (present on admission)  Assessment & Plan  Descalated to comfort care, pre-existing poor prognosis    Leukocytosis- (present on admission)  Assessment & Plan  Likely reactive, no obvious evidence of infection    Anemia due to chronic kidney disease, on chronic dialysis (HCC)- (present on admission)  Assessment & Plan  With possible intermittent blood loss    Encephalopathy- (present on admission)  Assessment & Plan  Likely multifactorial, metabolic, history of TBI  Hyponatremia      DNR (do not resuscitate)- (present on admission)  Assessment & Plan  Pre-existing, implemented  Care now D escalated to hospice and comfort care    Contraindication to deep vein thrombosis (DVT) prophylaxis- (present on admission)  Assessment & Plan  Not indicated    Essential hypertension- (present on admission)  Assessment & Plan  History of, now comfort care    Hypothyroid- (present on admission)  Assessment & Plan  History of, now comfort care    Plan  Continue with comfort care/hospice care  The patient requires very frequent reassessments, interventions for comfort, reassessing and administering IV medications      See orders  Medically complex and high risk patient very poor prognosis, end-of-life decision-making    VTE prophylaxis: Contraindicated, SCD      I have performed a physical exam and reviewed and updated ROS and Plan today . In review of yesterday's note , there are no changes except as documented above.        Please note that this dictation was created using voice recognition software. I have made every reasonable attempt to correct obvious errors, but I expect that there are errors of grammar and possibly context that I did not discover before finalizing the note.

## 2021-05-03 NOTE — CARE PLAN
Problem: Pain Management  Goal: Pain level will decrease to patient's comfort goal  Note: Pain is assessed throughout the shift and per unit protocol. Pharmacological and non-pharmacological measures to treat pain are offered to patient. Pt is medicated per MAR.         Problem: Skin Integrity  Goal: Risk for impaired skin integrity will decrease  Note: Skin is assessed for integrity throughout the shift. Pt is turned at least every 2 hours. Pt is kept dry and clean.

## 2021-05-04 NOTE — DISCHARGE PLANNING
Care Transition Team Discharge Planning    Anticipated Discharge Disposition: TBD    Action: Lsw conducted chart review. It appears pt is not qualified for inpt hospice. The admission RN will continue to follow and reassess for placement.    Barriers to Discharge: a location to d/c that will accept pt's family visits while she is on hospice     Plan: Lsw will continue to follow, and assist w/ d/c planning.

## 2021-05-04 NOTE — HOSPICE
Spoke to Nicole Mendoza . Patient doesn't qualify for GIP at this time.   Patient appears comfortable with current Dilaudid and Ativan dosing. Will reevaluate again tomorrow morning.

## 2021-05-05 NOTE — PROGRESS NOTES
Hospital Medicine Daily Progress Note    Date of Service  5/5/2021    Chief Complaint  70 y.o. female admitted 4/27/2021 with altered mental status    Hospital Course    This is a 70-year-old female with a history of alcohol abuse, alcoholic cirrhosis, hepatorenal syndrome on hemodialysis, history of hypothyroidism, hypertension, subdural hematoma brought to our facility in transfer from [facility] where the patient had apparently been found confused, the CT was showing reportedly a intraparenchymal hemorrhage.  The patient was not a reliable historian to time, has had frequent falls apparently, was residing at a rehab facility.  The patient was found to have a sodium of 114, the patient referred in transfer to the intensive care physicians here as well as Dr. Watson from neurosurgery.  The patient was admitted to ICU with the above concerns.     Interval Problem Update         4/28 the patient reportedly is stabilized for transfer out of ICU by the intensivist, a official neurosurgical evaluation is pending, a follow-up CTA head did not show  no large vessel occlusion or aneurysmal findings, the initial CT without contrast showed a 15 mm area of increased density involving the right posterior parietal lobe.  Preliminary report from neurosurgery was that there is no intervention planned at this time.  Nephrology was called for ongoing hemodialysis and sodium correction.  It was felt that the patient at this time is too high risk for ongoing hemodialysis, sodium correction if asked physician or concentrated sodium solution.  The patient is confused, she is unable to tell me the location or year or her current condition, the patient does have a DNR/DNI status.  Palliative care and hospice has been consulted  4/29 the patient with fairly unchanged status, confused, lethargic, denies current pain, laboratory data with slight improvement, slightly higher sodium level, persistent renal dysfunction,  hospice met with patient and conversation with family, proceed with comfort care/hospice, family to arrive later on today to spend time with patient  4/30 the patient remains lethargic, confused, appears comfortable on current medication regimen, all family members at the bedside conferring with hospice, A[facility].  Location of hospice and transferred to be determined.  5/1 the patient remains with comfort measures in place, awaiting hospice disposition, her current medication regimen appears adequate to keep the patient comfortable.  Family is visiting.  5/2 the patient with no new complaints today, her abdomen is more protuberant but she appears otherwise comfortable, poor p.o. intake and poor urine output persisting.  Ongoing comfort care measures, family visiting, disposition still pending  5/3 the patient is resting, she needs very frequent IV medication administration and assessments for comfort, the patient appears to have intermittent breakthrough pain, family is at the bedside, attentive,  The patient is time requires frequent medical interventions to provide comfort  Including IV medication  5/4: Ms. Koroma was evaluated in the ICU. I met with her son, daughter, sister, and brother-in-law at bedside. They note that her breathing has slowed. They state that the every hour IV morphine or ativan is required to keep her comfortable. I discussed with her night nurse as well as day nurse.  5/5: she remains on every other hour dilaudid and every other hour ativan. I discussed in person with family about this and they are concerned with her secretions and work of breathing and we have decided that we will change to hourly IV dilaudid and use ativan as prn. 35 minutes spent that of which over 50% of the time was spent in counseling about end of life management and expectations.     Consultants/Specialty  Critical care  Neurosurgery  Nephrology  Code Status  Comfort care,  DNR/DNI     Disposition  Institutionalized Hospice was consulted.      Review of Systems  Review of Systems   Unable to perform ROS: Medical condition        Physical Exam  Temp:  [35.7 °C (96.3 °F)-36.4 °C (97.6 °F)] 35.7 °C (96.3 °F)  Pulse:  [] 102  Resp:  [12-16] 16  BP: (64-76)/(38-41) 64/38  SpO2:  [79 %-88 %] 79 %    Physical Exam  Vitals and nursing note reviewed.   Constitutional:       Appearance: She is ill-appearing and toxic-appearing.   Pulmonary:      Comments: Slow respiratory rate, very coarse tracheal breath sounds with prolonged expiratory phase  Skin:     General: Skin is dry.      Coloration: Skin is pale.   Psychiatric:      Comments: Non-verbal         Fluids    Intake/Output Summary (Last 24 hours) at 5/5/2021 7431  Last data filed at 5/5/2021 0600  Gross per 24 hour   Intake 0 ml   Output 0 ml   Net 0 ml       Laboratory                            Assessment/Plan  * Hyponatremia- (present on admission)  Assessment & Plan  Sodium 114 on presentation  Possibly hypervolemic hyponatremia  History of cirrhosis and hepatorenal syndrome      ICH (intracerebral hemorrhage) (HCC)- (present on admission)  Assessment & Plan  CT imaging today with possible intraparenchymal hemorrhage  Neurosurgery consulted  No intervention  Now care deescalated to comfort care, hospice    Cirrhosis (HCC)- (present on admission)  Assessment & Plan  History of alcohol abuse and alcoholic cirrhosis    She is not a transplant candidate, palliative care, comfort care and hospice    Hepatorenal syndrome (HCC)- (present on admission)  Assessment & Plan  Descalated to comfort care, pre-existing poor prognosis    Leukocytosis- (present on admission)  Assessment & Plan  Likely reactive, no obvious evidence of infection    Anemia due to chronic kidney disease, on chronic dialysis (HCC)- (present on admission)  Assessment & Plan  With possible intermittent blood loss    Encephalopathy- (present on admission)  Assessment &  Plan  Likely multifactorial, metabolic, history of TBI  Hyponatremia      DNR (do not resuscitate)- (present on admission)  Assessment & Plan  Pre-existing, implemented  Care now transitioned to comfort care  IV dilaudid every hour and ativan as needed  She may require a morphine drip     Contraindication to deep vein thrombosis (DVT) prophylaxis- (present on admission)  Assessment & Plan  Not indicated    Essential hypertension- (present on admission)  Assessment & Plan  History of, now comfort care    Hypothyroid- (present on admission)  Assessment & Plan  History of, now comfort care         VTE prophylaxis: not indicated

## 2021-05-05 NOTE — PROGRESS NOTES
Received bedside report assumed care of pt. Pt resting in bed.  Bed alarm on and in lowest position. Pt's family at bedside. Discussed today's POC.

## 2021-05-05 NOTE — PROGRESS NOTES
Received bedside report from Froilan RN. Sister at bedside. Patient comfortable with PRN meds. Questions/concerns addressed.

## 2021-05-05 NOTE — PROGRESS NOTES
Hospital Medicine Daily Progress Note    Date of Service  5/4/2021    Chief Complaint  70 y.o. female admitted 4/27/2021 with altered mental status    Hospital Course    This is a 70-year-old female with a history of alcohol abuse, alcoholic cirrhosis, hepatorenal syndrome on hemodialysis, history of hypothyroidism, hypertension, subdural hematoma brought to our facility in transfer from [facility] where the patient had apparently been found confused, the CT was showing reportedly a intraparenchymal hemorrhage.  The patient was not a reliable historian to time, has had frequent falls apparently, was residing at a rehab facility.  The patient was found to have a sodium of 114, the patient referred in transfer to the intensive care physicians here as well as Dr. Watson from neurosurgery.  The patient was admitted to ICU with the above concerns.     Interval Problem Update         4/28 the patient reportedly is stabilized for transfer out of ICU by the intensivist, a official neurosurgical evaluation is pending, a follow-up CTA head did not show  no large vessel occlusion or aneurysmal findings, the initial CT without contrast showed a 15 mm area of increased density involving the right posterior parietal lobe.  Preliminary report from neurosurgery was that there is no intervention planned at this time.  Nephrology was called for ongoing hemodialysis and sodium correction.  It was felt that the patient at this time is too high risk for ongoing hemodialysis, sodium correction if asked physician or concentrated sodium solution.  The patient is confused, she is unable to tell me the location or year or her current condition, the patient does have a DNR/DNI status.  Palliative care and hospice has been consulted  4/29 the patient with fairly unchanged status, confused, lethargic, denies current pain, laboratory data with slight improvement, slightly higher sodium level, persistent renal dysfunction,  hospice met with patient and conversation with family, proceed with comfort care/hospice, family to arrive later on today to spend time with patient  4/30 the patient remains lethargic, confused, appears comfortable on current medication regimen, all family members at the bedside conferring with hospice, Avel.  Location of hospice and transferred to be determined.  5/1 the patient remains with comfort measures in place, awaiting hospice disposition, her current medication regimen appears adequate to keep the patient comfortable.  Family is visiting.  5/2 the patient with no new complaints today, her abdomen is more protuberant but she appears otherwise comfortable, poor p.o. intake and poor urine output persisting.  Ongoing comfort care measures, family visiting, disposition still pending  5/3 the patient is resting, she needs very frequent IV medication administration and assessments for comfort, the patient appears to have intermittent breakthrough pain, family is at the bedside, attentive,  The patient is time requires frequent medical interventions to provide comfort  Including IV medication  5/4: Ms. Koroma was evaluated in the ICU. I met with her son, daughter, sister, and brother-in-law at bedside. They note that her breathing has slowed. They state that the every hour IV morphine or ativan is required to keep her comfortable. I discussed with her night nurse as well as day nurse.     Consultants/Specialty  Critical care  Neurosurgery  Nephrology  Code Status  Comfort care, DNR/DNI     Disposition  Institutionalized Hospice was consulted.      Review of Systems  Review of Systems   Unable to perform ROS: Medical condition        Physical Exam  Temp:  [35.6 °C (96.1 °F)] 35.6 °C (96.1 °F)  Pulse:  [100-114] 100  BP: (91)/(58) 91/58  SpO2:  [73 %-88 %] 82 %    Physical Exam  Vitals and nursing note reviewed.   Constitutional:       Appearance: She is ill-appearing and toxic-appearing.   Pulmonary:       Comments: Slow respiratory rate, very coarse tracheal breath sounds  Skin:     General: Skin is dry.      Coloration: Skin is pale.   Psychiatric:      Comments: Non-verbal         Fluids    Intake/Output Summary (Last 24 hours) at 5/4/2021 1727  Last data filed at 5/3/2021 2000  Gross per 24 hour   Intake 0 ml   Output 0 ml   Net 0 ml       Laboratory                            Assessment/Plan  * Hyponatremia- (present on admission)  Assessment & Plan  Sodium 114 on presentation  Possibly hypervolemic hyponatremia  History of cirrhosis and hepatorenal syndrome      ICH (intracerebral hemorrhage) (HCC)- (present on admission)  Assessment & Plan  CT imaging today with possible intraparenchymal hemorrhage  Neurosurgery consulted  No intervention  Now care deescalated to comfort care, hospice    Cirrhosis (HCC)- (present on admission)  Assessment & Plan  History of alcohol abuse and alcoholic cirrhosis  LFTs mildly elevated  -Avoid hepatotoxic medications if possible  -Monitor  She is not a transplant candidate, palliative care, comfort care and hospice    Hepatorenal syndrome (HCC)- (present on admission)  Assessment & Plan  Descalated to comfort care, pre-existing poor prognosis    Leukocytosis- (present on admission)  Assessment & Plan  Likely reactive, no obvious evidence of infection    Anemia due to chronic kidney disease, on chronic dialysis (HCC)- (present on admission)  Assessment & Plan  With possible intermittent blood loss    Encephalopathy- (present on admission)  Assessment & Plan  Likely multifactorial, metabolic, history of TBI  Hyponatremia      DNR (do not resuscitate)- (present on admission)  Assessment & Plan  Pre-existing, implemented  Care now transitioned to comfort care  IV morphine and ativan q1 hour  She may require a morphine drip     Contraindication to deep vein thrombosis (DVT) prophylaxis- (present on admission)  Assessment & Plan  Not indicated    Essential hypertension- (present on  admission)  Assessment & Plan  History of, now comfort care    Hypothyroid- (present on admission)  Assessment & Plan  History of, now comfort care         VTE prophylaxis: not indicated

## 2021-05-06 NOTE — DISCHARGE SUMMARY
Death Summary    Cause of Death  Cardiac arrest due to intracranial hemorrhage secondary to unknown cause    Comorbid Conditions at the Time of Death  Principal Problem:    Hyponatremia POA: Yes  Active Problems:    ICH (intracerebral hemorrhage) (HCC) POA: Yes    Leukocytosis POA: Yes    Hepatorenal syndrome (HCC) POA: Yes    Cirrhosis (HCC) POA: Yes    Encephalopathy POA: Yes    Anemia due to chronic kidney disease, on chronic dialysis (HCC) POA: Yes    Hypothyroid POA: Yes    Essential hypertension POA: Yes    Contraindication to deep vein thrombosis (DVT) prophylaxis POA: Yes    DNR (do not resuscitate) POA: Yes  Resolved Problems:    * No resolved hospital problems. *      History of Presenting Illness and Hospital Course  This is a 70 y.o. female admitted 4/27/2021 with altered level of consciousness.             This is a 70-year-old female with a history of alcohol abuse, alcoholic cirrhosis, hepatorenal syndrome on hemodialysis, history of hypothyroidism, hypertension, subdural hematoma brought to our facility in transfer from [facility] where the patient had apparently been found confused, the CT was showing reportedly a intraparenchymal hemorrhage.  The patient was not a reliable historian to time, has had frequent falls apparently, was residing at a rehab facility.  The patient was found to have a sodium of 114, the patient referred in transfer to the intensive care physicians here as well as Dr. Watson from neurosurgery.  The patient was admitted to ICU with the above concerns.        4/28 the patient reportedly is stabilized for transfer out of ICU by the intensivist, a official neurosurgical evaluation is pending, a follow-up CTA head did not show  no large vessel occlusion or aneurysmal findings, the initial CT without contrast showed a 15 mm area of increased density involving the right posterior parietal lobe.  Preliminary report from neurosurgery was that there is no intervention  planned at this time.  Nephrology was called for ongoing hemodialysis and sodium correction.  It was felt that the patient at this time is too high risk for ongoing hemodialysis, sodium correction if asked physician or concentrated sodium solution.  The patient is confused, she is unable to tell me the location or year or her current condition, the patient does have a DNR/DNI status.  Palliative care and hospice has been consulted  4/29 the patient with fairly unchanged status, confused, lethargic, denies current pain, laboratory data with slight improvement, slightly higher sodium level, persistent renal dysfunction, hospice met with patient and conversation with family, proceed with comfort care/hospice, family to arrive later on today to spend time with patient  4/30 the patient remains lethargic, confused, appears comfortable on current medication regimen, all family members at the bedside conferring with hospice, Avel.  Location of hospice and transferred to be determined.  5/1 the patient remains with comfort measures in place, awaiting hospice disposition, her current medication regimen appears adequate to keep the patient comfortable.  Family is visiting.  5/2 the patient with no new complaints today, her abdomen is more protuberant but she appears otherwise comfortable, poor p.o. intake and poor urine output persisting.  Ongoing comfort care measures, family visiting, disposition still pending  5/3 the patient is resting, she needs very frequent IV medication administration and assessments for comfort, the patient appears to have intermittent breakthrough pain, family is at the bedside, attentive,  The patient is time requires frequent medical interventions to provide comfort  Including IV medication  5/4: Ms. Koroma was evaluated in the ICU. I met with her son, daughter, sister, and brother-in-law at bedside. They note that her breathing has slowed. They state that the every hour IV morphine or  ativan is required to keep her comfortable. I discussed with her night nurse as well as day nurse.  5/5: she remains on every other hour dilaudid and every other hour ativan. I discussed in person with family about this and they are concerned with her secretions and work of breathing and we have decided that we will change to hourly IV dilaudid and use ativan as prn.   5/6: Patient does not have a pulse and no spontaneous breathing. Time of death 13:40. I personally declared her passing. I met with her family at bedside.      Consultants/Specialty  Critical care  Neurosurgery  Nephrology  Hospice     32 minutes were spent on date of death including declaration of her passing, meeting with family and nurse, and review of the chart.

## 2021-05-06 NOTE — HOSPICE
Pt resting comfortably with current prn meds.  Pt not GIP appropriate at this time per DON Stevenson.    Family refuses to allow her to transfer to another facility.

## 2021-05-18 NOTE — DOCUMENTATION QUERY
"                                                                         [facility]                                                                       Query Response Note      PATIENT:               ZELALEM CASAS  ACCT #:                  [id number]  MRN:                     [id number]  :                      1950  ADMIT DATE:       2021 9:47 PM  DISCH DATE:        2021 1:40 PM  RESPONDING  PROVIDER #:        [id number]           QUERY TEXT:    Encephalopathy is documented in the Discharge Summary. Please specify type.    NOTE:  If an appropriate response is not listed below, please respond with a new note.    The patient's Clinical Indicators include:  \"Encephalopathy- (present on admission) Likely multifactorial, metabolic, history of TBI, Hyponatremia\" is documented in the Progress Notes. BUN/Cr: 35/4.6, GFR: 9, Na: 114, and Cl: 80 on admission. BUN/Cr: 105/6 w/GFR > 60 during prior admission on 21. \"Patient with small stable R posterior parietal/periventricular  IPH, no mass effect, sounds like patients severe medical issues are the causative problem  for her decline\" documented in the Neurosurgery Consult.    Treatments include: CTA-Head, Hemodialysis, CMP, Neurosurgery Consult, Critical Care Consult, and Nephrology Consult.    Risk factors include: dx Non-traumatic IPH, dx Hepatorenal Syndrome, dx Acidosis, hx EtOH Abuse w/Cirrhosis of Liver, hx HTN + CKD, and Advanced Age.    Thank you,  Shaun Travis RN, BSN  Clinical   Connect via [employer]  Options provided:   -- Metabolic encephalopathy   -- Other type of encephalopathy   -- Unable to determine      Query created by: Nando Travis on 5/10/2021 10:42 AM    RESPONSE TEXT:    Metabolic encephalopathy       QUERY TEXT:    Please clarify in documentation the relationship, if any, between Hyponatremia and LOLIS.    The patient's Clinical Indicators include:  \"Started on hemodialysis due to acute kidney " "injury from hepatorenal syndrome\" is documented in the Nephrology Consult on 4/29/21. BUN/Cr: 35/4.6, GFR: 9, Na: 114, and Cl: 80 on admission. BUN/Cr: 105/6 w/GFR > 60 during prior admission on 1/28/21.     Treatments include: Hemodialysis, CMP, and Nephrology Consult.    Risk factors include: dx Hepatorenal Syndrome, dx Acidosis, hx EtOH Abuse w/Cirrhosis of Liver, hx HTN + CKD, and Advanced Age.    Thank you,  Shaun Travis RN, BSN  Clinical   Connect via [employer]  Options provided:   -- Hyponatremia is due to or associated with LOLIS   -- Unrelated to each other   -- Unable to determine      Query created by: Nando Travis on 5/10/2021 10:43 AM    RESPONSE TEXT:    Hyponatremia is due to or associated with LOLIS          Electronically signed by:  JUAN CHAVES MD 5/18/2021 11:47 AM              "

## 2021-05-19 NOTE — DOCUMENTATION QUERY
"                                                                         [facility]                                                                       Query Response Note      PATIENT:               ZELALEM CASAS  ACCT #:                  [id number]  MRN:                     [id number]  :                      1950  ADMIT DATE:       2021 9:47 PM  DISCH DATE:        2021 1:40 PM  RESPONDING  PROVIDER #:        [id number]           QUERY TEXT:    It is unclear whether LOLIS was present on admission.  Please clarify the POA status.     NOTE:  If an appropriate response is not listed below, please respond with a new note.      The patient's Clinical Indicators include:  \"Started on hemodialysis due to acute kidney injury from hepatorenal syndrome\" is documented in the Nephrology Consult on 21. BUN/Cr: 35/4.6, GFR: 9, Na: 114, and Cl: 80 on admission. BUN/Cr: 105/6 w/GFR > 60 during prior admission on 21.     Treatments include: Hemodialysis, CMP, and Nephrology Consult.    Risk factors include: dx Hepatorenal Syndrome, dx Acidosis, hx EtOH Abuse w/Cirrhosis of Liver, hx HTN + CKD, and Advanced Age.    Thank you,  Shaun Travis RN, BSN  Clinical   Connect via [employer]  Options provided:   -- The condition of LOLIS was present at the time of inpatient admission   -- The condition of LOLIS was not present at the time of inpatient admission   -- The documentation is insufficient to determine if condition of LOLIS is present on admission   -- The provider is unable to clinically determine whether condition of LOLIS was present on admission or not      Query created by: Nando Travis on 5/10/2021 10:37 AM    RESPONSE TEXT:    The condition of LOLIS was present at the time of inpatient admission          Electronically signed by:  PAULETTE HEATON MD 2021 7:00 PM              "

## 2022-11-17 NOTE — CONSULTS
DATE OF SERVICE:  04/29/2021     NEUROSURGERY INPATIENT CONSULTATION     CHIEF COMPLAINT:  Minimal intracranial hemorrhage, hepatorenal syndrome.     HISTORY OF PRESENT ILLNESS:  This is a 70-year-old right-handed unfortunate   woman who has serious metabolic issues with hepatorenal syndrome, elevated   INR.  Sodium of 116 this morning.  Other derangements to her standard labs,   creatinine of 4.29 and there was a question of mental status change in outside   hospital.  A head CT was obtained that showed small non-mass effect causing   little over 1 cm right posterior parietal periventricular hemorrhage.  CTA   shows no vascular etiology and stable hemorrhage.  I was consulted just on the   hemorrhage.     PAST MEDICAL HISTORY:  Listed in the chart and includes that which is   mentioned in the HPI plus hypothyroidism, renal failure, cirrhosis, thyroid   disorders.     PAST SURGICAL HISTORY:  Include hysterectomy, laparoscopic cholecystectomy.     SOCIAL HISTORY:  She is nonsmoker, occasional drinker.     PHYSICAL EXAMINATION:  GENERAL:  She is lethargic but answers questions appropriately, oriented x2.    She got the year wrong.  NEUROLOGIC:  She is moving her arms and legs symmetrically.  Good sensation to   face, arms and legs.  HEENT:  Her pupils are symmetric.  Extraocular motions are grossly intact.    She is overall subdued.     ASSESSMENT AND PLAN:  The patient has minimal intracranial hemorrhage, stable   x2 scans.  There is no neurosurgical intervention.  I will defer this care to   the hospitalist service.        ______________________________  MD JANA Mera III/CASSIE    DD:  04/29/2021 18:05  DT:  04/29/2021 21:31    Job#:  [id number]   Clindamycin Counseling: I counseled the patient regarding use of clindamycin as an antibiotic for prophylactic and/or therapeutic purposes. Clindamycin is active against numerous classes of bacteria, including skin bacteria. Side effects may include nausea, diarrhea, gastrointestinal upset, rash, hives, yeast infections, and in rare cases, colitis.

## 2024-04-22 NOTE — CARE PLAN
Problem: Communication  Goal: The ability to communicate needs accurately and effectively will improve  Outcome: PROGRESSING AS EXPECTED     Problem: Venous Thromboembolism (VTW)/Deep Vein Thrombosis (DVT) Prevention:  Goal: Patient will participate in Venous Thrombosis (VTE)/Deep Vein Thrombosis (DVT)Prevention Measures  Outcome: PROGRESSING AS EXPECTED      [FreeTextEntry1] : Previous doc: Mild OA both hips but no significant exam findings. Right TKA 2014 always with pain but good exam and XRs. Left knee medial pain no sig XR findings. PT and NSAIDs and MRI left knee r/o MMT. 1/29/19: MRI showing mild OA with LMT. Lateral pain today. Discussed options - she would like to continue PT for now and possible cortisone inj or knee scope if no improvement in the future. 2/26 we will try inj today - cont with PT - she understands that still will have the tear and pain may retrun may still need arthroscopy 4/2/19: Left knee doing well - hold on scope. Right TKA cont pain and states it is exactly like before surgery but has good function other than some mild crepitus. No hip pain on exam. Discussed lidocaine challenge or Lspine workup but she would like to hold off on any workup at this point. 8/20 she has cont pain in the left knee with LMT and OA but I am unsure if scope will help her nasd TKA on rt she is not happy with - we will try another inj today 2/4/20: Recurrence of left knee pain. No XR progression - has right TKA that she is still unhappy with - repeat inj left knee today and offered left knee arthroscopy if pain persists. 8/31/20: Repeat inj left knee today - discussed possible future left knee scope if pain returns. 4/30/21: No change in left knee XR. Has lateral symptoms - MRI 2019 with large LMT. Right TKA still with pain but feels very stable. Left knee inj today, used zilretta. 10/29/21: Return of left knee pain - repeat zilretta inj left knee today. 5/6/22: patient had an acute fall 2 weeks ago and has significant bruising on left wrist and right knee. No abnormalities seen on XR. Wrist treated with cockup wrist splint and advised patient to use ice and warm compresses on knee. 6/14/222: R TKA s/p fall still has some swelling and bruising - reassured her this is normal for 6 weeks and will likely resolve on its own. Left knee LMT has failed forms of conservative treatment and would like to proceed with scope. Will try voltaren gel for now. - review of MRI shows min O Abut there is some - not bad enough for TKA but large tear may do well with Scope - she will NOT have TKA again not happy with rt knee result. understands will have knee OA After scope and may still need tx and have some pain 10/25/22: She has hesitancy on a L knee scope. Discussed repeat Zilretta vs. visco series. She is well informed and would like to proceed with beginning the Orthovisc series today, tolerated. 11/1/22: Inj tolerated well. 11/8/22: Inj tolerated well. 11/15/22: Inj tolerated well. 2/13/23: Cont left knee pain - no relief from HA injections, XRs unchanged - she would like to proceed with left knee arthroscopy. Again discussed that she has some OA and may need some treatments for this in the future including repeat injections - she does not want TKA and would like to try scope to alleviate mechanical symptoms. 7/21/23: Left hip troch bursitis, continued L knee pain. Discussed anti-inflammatories, MDP, PT/HEP, and bursa CSI. She would like to plan for L knee arthroscopy after Summer 2023. L hip bursa CSI today, tolerated well. 9/19/23: Continued lateral sided left knee pain. Discussed options for arthroscopy but with her underlying OA i dont feel arthroscopy will prevent TKA in the future. She is not happy with her right TKA therefore she will return as needed for injections to alleviate pain. csi left knee done today- tolerated well. 12/5/23: Return of pain, last inj helped for 2 months.  Will try zilretta left knee today, tolerated well.  4/22/24: Only short-term relief with Zilretta. Discussed tx options- r/b/a. She is very hesitant to proceed surgically because she is not thrilled with her RT knee but understands this is her best option at this point. Will try Zilretta again today. F/up in 3 months.